# Patient Record
Sex: MALE | Race: WHITE | NOT HISPANIC OR LATINO
[De-identification: names, ages, dates, MRNs, and addresses within clinical notes are randomized per-mention and may not be internally consistent; named-entity substitution may affect disease eponyms.]

---

## 2023-01-09 PROBLEM — Z00.00 ENCOUNTER FOR PREVENTIVE HEALTH EXAMINATION: Status: ACTIVE | Noted: 2023-01-09

## 2023-01-09 NOTE — DATA REVIEWED
[FreeTextEntry1] : Cardiac Cath [facility] 1/9/2022: pLAD 99%, mLAD 20%, first diag 99%, first septal 99%, prox Cx 80%, pRCA 50%

## 2023-01-09 NOTE — HISTORY OF PRESENT ILLNESS
[FreeTextEntry1] : Mr. Robbins is a 67 year old male who presents today for evaluation of coronary artery disease.  To review, PMH includes CAD (PCI - LAD in 2010), HTN, HLD and gout.  He was recently evaluated by his cardiologist Dr. Tray Segundo for exertional chest pain. He underwent stress stest that was abnormal and ultimately underwent cardiac cath  that showed severe multi vessel disease. pLAD 99%, mLAD 20%, first diag 99%, first septal 99%, prox Cx 80%, pRCA 50%

## 2023-01-10 ENCOUNTER — APPOINTMENT (OUTPATIENT)
Dept: CARDIOTHORACIC SURGERY | Facility: CLINIC | Age: 68
End: 2023-01-10
Payer: MEDICARE

## 2023-01-10 VITALS
DIASTOLIC BLOOD PRESSURE: 85 MMHG | SYSTOLIC BLOOD PRESSURE: 146 MMHG | OXYGEN SATURATION: 98 % | BODY MASS INDEX: 29.77 KG/M2 | HEART RATE: 62 BPM | RESPIRATION RATE: 16 BRPM | WEIGHT: 201 LBS | HEIGHT: 69 IN

## 2023-01-10 DIAGNOSIS — Z78.9 OTHER SPECIFIED HEALTH STATUS: ICD-10-CM

## 2023-01-10 DIAGNOSIS — Z86.39 PERSONAL HISTORY OF OTHER ENDOCRINE, NUTRITIONAL AND METABOLIC DISEASE: ICD-10-CM

## 2023-01-10 DIAGNOSIS — I10 ESSENTIAL (PRIMARY) HYPERTENSION: ICD-10-CM

## 2023-01-10 DIAGNOSIS — R06.02 SHORTNESS OF BREATH: ICD-10-CM

## 2023-01-10 PROCEDURE — 99205 OFFICE O/P NEW HI 60 MIN: CPT

## 2023-01-10 RX ORDER — ASPIRIN 81 MG/1
81 TABLET ORAL
Refills: 0 | Status: ACTIVE | COMMUNITY

## 2023-01-10 NOTE — HISTORY OF PRESENT ILLNESS
[FreeTextEntry1] : Mr. ERIK WALLER is a 67 year old male referred by Dr. Tray Segundo who presents for consultation regarding coronary artery disease. He had recently presented to his cardiologist with exertional chest pressure and shortness of breath. Subsequently, he had a stress test that showed a mild defect in two areas (inferior and anterolateral). Cardiac catheterization revealed severe 4-vessel coronary artery disease with an elevated LVEDP and normal systolic left ventricular function.\par \par His pertinent past medical history includes coronary artery disease status post stent to LAD in 2010, hypertension, hyperlipidemia and gout.\par \par Today, the patient reports no chest pressure since his incident, no shortness of breath, palpitations, lower extremity edema.

## 2023-01-10 NOTE — CONSULT LETTER
[FreeTextEntry2] : Tray Segundo MD [FreeTextEntry3] : Ravinder Booker MD\par Chief, Cardiovascular Surgery at [facility]\par System Director of Surgical Heart Failure\par Professor, Cardiovascular and Thoracic Surgery\par [facility] [employer], [facility]\par [facility]\par [address] [place] [address]\[place] [address]\par Tel. [phone]\par Fax [phone]\par

## 2023-01-10 NOTE — REVIEW OF SYSTEMS
[SOB on Exertion] : shortness of breath during exertion [Negative] : Heme/Lymph [Feeling Poorly] : not feeling poorly [Feeling Tired] : not feeling tired [Chest Pain] : no chest pain [Palpitations] : no palpitations [Lower Ext Edema] : no extremity edema [Shortness Of Breath] : no shortness of breath [Cough] : no cough

## 2023-01-10 NOTE — DATA REVIEWED
[FreeTextEntry1] : Cardiac Cath\par 1/9/23\par [facility]\par \par Left Main: normal\par Left anterior descending: mild diffuse disease, proximal lesion 99% stenosed, mid LAD 20%\par First diagonal branch: 99% stenosed\par First Septal branch: 99% stenosed\par Left Circumflex: Mild diffuse disease, proximal lesion 80% stenosed\par Right Coronary Artery, moderate diffuse disease, proximal lesion, 50% stenosed, mid RCA 90%\par \par Left ventricle: normal size with elevated LV end diastolic pressure of 23 mmHg\par Ejection fraction is 50-59% by visual estimate

## 2023-01-10 NOTE — ASSESSMENT
[FreeTextEntry1] : Mr Rosendo presents to the office today to discuss recent imaging consistent with multivessel coronary artery disease. He had initially been evaluated by his cardiologist after having some acute exertional shortness of breath and chest discomfort with walking through the woods. This was the first time he has experienced this feeling. He has not experienced these symptoms since. \par \par Cardiac Catheterization imaging was independently reviewed and there is clear evidence of multivessel coronary artery disease with a prior stent in place. Addressing the occlusions will require coronary artery bypass grafting. \par \par Risks benefits and alternatives to CABG were discussed with the patient in detail. Risks discussed included, but not limited to infection, bleeding, myocardial infarction, cerebrovascular accident, renal failure, vascular injury requiring intervention, cardiac rupture, and death.\par \par The procedure, hospital stay and recovery was discussed in detail. All questions addressed. Patient would like to proceed with surgical intervention as discussed.\par \par PLAN:\par - Transthoracic Echocardiogram at [facility] \par - Obtain Carotid Artery Ultrasound results from Cardiology \par - Coronary artery bypass grafting x 4 \par \par \par \par \par \par \par I, Dr. Booker, personally performed the evaluation and management (E/M) services for this new patient.  That E/M includes conducting the initial examination, assessing all conditions, and establishing the plan of care.  Today, my ACP, Rolly Glynn NP was here to observe my evaluation and management services for this patient to be followed going forward.\par \par \par

## 2023-01-11 ENCOUNTER — APPOINTMENT (OUTPATIENT)
Dept: CARDIOTHORACIC SURGERY | Facility: CLINIC | Age: 68
End: 2023-01-11
Payer: MEDICARE

## 2023-01-11 ENCOUNTER — APPOINTMENT (OUTPATIENT)
Dept: CARDIOTHORACIC SURGERY | Facility: CLINIC | Age: 68
End: 2023-01-11

## 2023-01-19 ENCOUNTER — OUTPATIENT (OUTPATIENT)
Dept: OUTPATIENT SERVICES | Facility: HOSPITAL | Age: 68
LOS: 1 days | End: 2023-01-19
Payer: MEDICARE

## 2023-01-19 ENCOUNTER — RESULT REVIEW (OUTPATIENT)
Age: 68
End: 2023-01-19

## 2023-01-19 VITALS
SYSTOLIC BLOOD PRESSURE: 130 MMHG | RESPIRATION RATE: 14 BRPM | HEART RATE: 63 BPM | OXYGEN SATURATION: 97 % | DIASTOLIC BLOOD PRESSURE: 70 MMHG | TEMPERATURE: 97 F | HEIGHT: 69 IN | WEIGHT: 207.23 LBS

## 2023-01-19 DIAGNOSIS — I25.10 ATHEROSCLEROTIC HEART DISEASE OF NATIVE CORONARY ARTERY WITHOUT ANGINA PECTORIS: ICD-10-CM

## 2023-01-19 DIAGNOSIS — I10 ESSENTIAL (PRIMARY) HYPERTENSION: ICD-10-CM

## 2023-01-19 DIAGNOSIS — Z98.61 CORONARY ANGIOPLASTY STATUS: Chronic | ICD-10-CM

## 2023-01-19 DIAGNOSIS — Z98.890 OTHER SPECIFIED POSTPROCEDURAL STATES: Chronic | ICD-10-CM

## 2023-01-19 DIAGNOSIS — Z91.89 OTHER SPECIFIED PERSONAL RISK FACTORS, NOT ELSEWHERE CLASSIFIED: ICD-10-CM

## 2023-01-19 DIAGNOSIS — Z29.9 ENCOUNTER FOR PROPHYLACTIC MEASURES, UNSPECIFIED: ICD-10-CM

## 2023-01-19 DIAGNOSIS — Z01.818 ENCOUNTER FOR OTHER PREPROCEDURAL EXAMINATION: ICD-10-CM

## 2023-01-19 LAB
A1C WITH ESTIMATED AVERAGE GLUCOSE RESULT: 5.2 % — SIGNIFICANT CHANGE UP (ref 4–5.6)
ALBUMIN SERPL ELPH-MCNC: 4.4 G/DL — SIGNIFICANT CHANGE UP (ref 3.3–5.2)
ALP SERPL-CCNC: 56 U/L — SIGNIFICANT CHANGE UP (ref 40–120)
ALT FLD-CCNC: 22 U/L — SIGNIFICANT CHANGE UP
ANION GAP SERPL CALC-SCNC: 10 MMOL/L — SIGNIFICANT CHANGE UP (ref 5–17)
APPEARANCE UR: ABNORMAL
APTT BLD: 29.1 SEC — SIGNIFICANT CHANGE UP (ref 27.5–35.5)
AST SERPL-CCNC: 21 U/L — SIGNIFICANT CHANGE UP
BACTERIA # UR AUTO: ABNORMAL
BASOPHILS # BLD AUTO: 0.03 K/UL — SIGNIFICANT CHANGE UP (ref 0–0.2)
BASOPHILS NFR BLD AUTO: 0.5 % — SIGNIFICANT CHANGE UP (ref 0–2)
BILIRUB SERPL-MCNC: 0.6 MG/DL — SIGNIFICANT CHANGE UP (ref 0.4–2)
BILIRUB UR-MCNC: NEGATIVE — SIGNIFICANT CHANGE UP
BLD GP AB SCN SERPL QL: SIGNIFICANT CHANGE UP
BUN SERPL-MCNC: 14.2 MG/DL — SIGNIFICANT CHANGE UP (ref 8–20)
CALCIUM SERPL-MCNC: 9.1 MG/DL — SIGNIFICANT CHANGE UP (ref 8.4–10.5)
CHLORIDE SERPL-SCNC: 105 MMOL/L — SIGNIFICANT CHANGE UP (ref 96–108)
CO2 SERPL-SCNC: 23 MMOL/L — SIGNIFICANT CHANGE UP (ref 22–29)
COLOR SPEC: YELLOW — SIGNIFICANT CHANGE UP
COMMENT - URINE: SIGNIFICANT CHANGE UP
CREAT SERPL-MCNC: 0.84 MG/DL — SIGNIFICANT CHANGE UP (ref 0.5–1.3)
DIFF PNL FLD: ABNORMAL
EGFR: 96 ML/MIN/1.73M2 — SIGNIFICANT CHANGE UP
EOSINOPHIL # BLD AUTO: 0.14 K/UL — SIGNIFICANT CHANGE UP (ref 0–0.5)
EOSINOPHIL NFR BLD AUTO: 2.5 % — SIGNIFICANT CHANGE UP (ref 0–6)
EPI CELLS # UR: NEGATIVE — SIGNIFICANT CHANGE UP
ESTIMATED AVERAGE GLUCOSE: 103 MG/DL — SIGNIFICANT CHANGE UP (ref 68–114)
GLUCOSE SERPL-MCNC: 102 MG/DL — HIGH (ref 70–99)
GLUCOSE UR QL: NEGATIVE MG/DL — SIGNIFICANT CHANGE UP
HCT VFR BLD CALC: 43.1 % — SIGNIFICANT CHANGE UP (ref 39–50)
HGB BLD-MCNC: 15.2 G/DL — SIGNIFICANT CHANGE UP (ref 13–17)
IMM GRANULOCYTES NFR BLD AUTO: 0.2 % — SIGNIFICANT CHANGE UP (ref 0–0.9)
INR BLD: 1.02 RATIO — SIGNIFICANT CHANGE UP (ref 0.88–1.16)
KETONES UR-MCNC: NEGATIVE — SIGNIFICANT CHANGE UP
LEUKOCYTE ESTERASE UR-ACNC: NEGATIVE — SIGNIFICANT CHANGE UP
LYMPHOCYTES # BLD AUTO: 1.02 K/UL — SIGNIFICANT CHANGE UP (ref 1–3.3)
LYMPHOCYTES # BLD AUTO: 17.9 % — SIGNIFICANT CHANGE UP (ref 13–44)
MCHC RBC-ENTMCNC: 30.5 PG — SIGNIFICANT CHANGE UP (ref 27–34)
MCHC RBC-ENTMCNC: 35.3 GM/DL — SIGNIFICANT CHANGE UP (ref 32–36)
MCV RBC AUTO: 86.4 FL — SIGNIFICANT CHANGE UP (ref 80–100)
MONOCYTES # BLD AUTO: 0.41 K/UL — SIGNIFICANT CHANGE UP (ref 0–0.9)
MONOCYTES NFR BLD AUTO: 7.2 % — SIGNIFICANT CHANGE UP (ref 2–14)
MRSA PCR RESULT.: SIGNIFICANT CHANGE UP
NEUTROPHILS # BLD AUTO: 4.1 K/UL — SIGNIFICANT CHANGE UP (ref 1.8–7.4)
NEUTROPHILS NFR BLD AUTO: 71.7 % — SIGNIFICANT CHANGE UP (ref 43–77)
NITRITE UR-MCNC: NEGATIVE — SIGNIFICANT CHANGE UP
NT-PROBNP SERPL-SCNC: 216 PG/ML — SIGNIFICANT CHANGE UP (ref 0–300)
PH UR: 5 — SIGNIFICANT CHANGE UP (ref 5–8)
PLATELET # BLD AUTO: 166 K/UL — SIGNIFICANT CHANGE UP (ref 150–400)
POTASSIUM SERPL-MCNC: 4.4 MMOL/L — SIGNIFICANT CHANGE UP (ref 3.5–5.3)
POTASSIUM SERPL-SCNC: 4.4 MMOL/L — SIGNIFICANT CHANGE UP (ref 3.5–5.3)
PREALB SERPL-MCNC: 22 MG/DL — SIGNIFICANT CHANGE UP (ref 18–38)
PROT SERPL-MCNC: 6.8 G/DL — SIGNIFICANT CHANGE UP (ref 6.6–8.7)
PROT UR-MCNC: NEGATIVE — SIGNIFICANT CHANGE UP
PROTHROM AB SERPL-ACNC: 11.8 SEC — SIGNIFICANT CHANGE UP (ref 10.5–13.4)
RBC # BLD: 4.99 M/UL — SIGNIFICANT CHANGE UP (ref 4.2–5.8)
RBC # FLD: 12.1 % — SIGNIFICANT CHANGE UP (ref 10.3–14.5)
RBC CASTS # UR COMP ASSIST: NEGATIVE /HPF — SIGNIFICANT CHANGE UP (ref 0–4)
S AUREUS DNA NOSE QL NAA+PROBE: SIGNIFICANT CHANGE UP
SODIUM SERPL-SCNC: 138 MMOL/L — SIGNIFICANT CHANGE UP (ref 135–145)
SP GR SPEC: 1.02 — SIGNIFICANT CHANGE UP (ref 1.01–1.02)
T3 SERPL-MCNC: 102 NG/DL — SIGNIFICANT CHANGE UP (ref 80–200)
T4 AB SER-ACNC: 5.8 UG/DL — SIGNIFICANT CHANGE UP (ref 4.5–12)
TSH SERPL-MCNC: 1.81 UIU/ML — SIGNIFICANT CHANGE UP (ref 0.27–4.2)
UROBILINOGEN FLD QL: NEGATIVE MG/DL — SIGNIFICANT CHANGE UP
WBC # BLD: 5.71 K/UL — SIGNIFICANT CHANGE UP (ref 3.8–10.5)
WBC # FLD AUTO: 5.71 K/UL — SIGNIFICANT CHANGE UP (ref 3.8–10.5)
WBC UR QL: NEGATIVE /HPF — SIGNIFICANT CHANGE UP (ref 0–5)

## 2023-01-19 PROCEDURE — G0463: CPT

## 2023-01-19 PROCEDURE — 93306 TTE W/DOPPLER COMPLETE: CPT

## 2023-01-19 PROCEDURE — 93306 TTE W/DOPPLER COMPLETE: CPT | Mod: 26

## 2023-01-19 PROCEDURE — 71046 X-RAY EXAM CHEST 2 VIEWS: CPT

## 2023-01-19 PROCEDURE — 93005 ELECTROCARDIOGRAM TRACING: CPT

## 2023-01-19 PROCEDURE — 93010 ELECTROCARDIOGRAM REPORT: CPT

## 2023-01-19 PROCEDURE — 71046 X-RAY EXAM CHEST 2 VIEWS: CPT | Mod: 26

## 2023-01-19 RX ORDER — CEFUROXIME AXETIL 250 MG
1500 TABLET ORAL ONCE
Refills: 0 | Status: DISCONTINUED | OUTPATIENT
Start: 2023-01-27 | End: 2023-01-27

## 2023-01-19 NOTE — H&P PST ADULT - HISTORY OF PRESENT ILLNESS
67 year old male with a pmhx of CAD pci x1, HTN, HLD.  Presents     reports dyspnea on exertion Nov 20th 2022    Patient denies chest pain, SOB, palpitations, dizziness, near syncope, or syncope   reports dyspnea with exertion,   Patient is scheduled for CABG x4 with Dr Booker on 1/27/23   67 year old male with a pmhx of CAD pci x1 in 2010, HTN, HLD, gout, family hx of early CAD Father MI at 50. Presented to his Cardiologist with dyspnea on exertion since Nov 2022.  He is s/p stress test that showed a mild defect in two areas (inferior and anterolateral). Cardiac catheterization revealed severe 4-vessel coronary artery disease with an elevated LVEDP and normal systolic left ventricular function. Patient denies chest pain, SOB, palpitations, dizziness, near syncope, or syncope. Patient is scheduled for CABG x4 with Dr Booker on 1/27/23.

## 2023-01-19 NOTE — H&P PST ADULT - ASSESSMENT
CAPRINI SCORE    AGE RELATED RISK FACTORS                                                             [ ] Age 41-60 years                                            (1 Point)  [ ] Age: 61-74 years                                           (2 Points)                 [ ] Age= 75 years                                                (3 Points)             DISEASE RELATED RISK FACTORS                                                       [ ] Edema in the lower extremities                 (1 Point)                     [ ] Varicose veins                                               (1 Point)                                 [ ] BMI > 25 Kg/m2                                            (1 Point)                                  [ ] Serious infection (ie PNA)                            (1 Point)                     [ ] Lung disease ( COPD, Emphysema)            (1 Point)                                                                          [ ] Acute myocardial infarction                         (1 Point)                  [ ] Congestive heart failure (in the previous month)  (1 Point)         [ ] Inflammatory bowel disease                            (1 Point)                  [ ] Central venous access, PICC or Port               (2 points)       (within the last month)                                                                [ ] Stroke (in the previous month)                        (5 Points)    [ ] Previous or present malignancy                       (2 points)                                                                                                                                                         HEMATOLOGY RELATED FACTORS                                                         [ ] Prior episodes of VTE                                     (3 Points)                     [ ] Positive family history for VTE                      (3 Points)                  [ ] Prothrombin 13158 A                                     (3 Points)                     [ ] Factor V Leiden                                                (3 Points)                        [ ] Lupus anticoagulants                                      (3 Points)                                                           [ ] Anticardiolipin antibodies                              (3 Points)                                                       [ ] High homocysteine in the blood                   (3 Points)                                             [ ] Other congenital or acquired thrombophilia      (3 Points)                                                [ ] Heparin induced thrombocytopenia                  (3 Points)                                        MOBILITY RELATED FACTORS  [ ] Bed rest                                                         (1 Point)  [ ] Plaster cast                                                    (2 points)  [ ] Bed bound for more than 72 hours           (2 Points)    GENDER SPECIFIC FACTORS  [ ] Pregnancy or had a baby within the last month   (1 Point)  [ ] Post-partum < 6 weeks                                   (1 Point)  [ ] Hormonal therapy  or oral contraception   (1 Point)  [ ] History of pregnancy complications              (1 point)  [ ] Unexplained or recurrent              (1 Point)    OTHER RISK FACTORS                                           (1 Point)  [ ] BMI >40, smoking, diabetes requiring insulin, chemotherapy  blood transfusions and length of surgery over 2 hours    SURGERY RELATED RISK FACTORS  [ ]  Section within the last month     (1 Point)  [ ] Minor surgery                                                  (1 Point)  [ ] Arthroscopic surgery                                       (2 Points)  [ ] Planned major surgery lasting more            (2 Points)      than 45 minutes     [ ] Elective hip or knee joint replacement       (5 points)       surgery                                                TRAUMA RELATED RISK FACTORS  [ ] Fracture of the hip, pelvis, or leg                       (5 Points)  [ ] Spinal cord injury resulting in paralysis             (5 points)       (in the previous month)    [ ] Paralysis  (less than 1 month)                             (5 Points)  [ ] Multiple Trauma within 1 month                        (5 Points)    Total Score [        ]    Caprini Score 0-2: Low Risk, NO VTE prophylaxis required for most patients, encourage ambulation  Caprini Score 3-6: Moderate Risk , pharmacologic VTE prophylaxis is indicated for most patients (in the absence of contraindications)  Caprini Score Greater than or =7: High risk, pharmocologic VTE prophylaxis indicated for most patients (in the absence of contraindications)                OPIOID RISK TOOL    PIPER EACH BOX THAT APPLIES AND ADD TOTALS AT THE END    FAMILY HISTORY OF SUBSTANCE ABUSE                 FEMALE         MALE                                                Alcohol                             [  ]1 pt          [  ]3pts                                               Illegal Durgs                     [  ]2 pts        [  ]3pts                                               Rx Drugs                           [  ]4 pts        [  ]4 pts    PERSONAL HISTORY OF SUBSTANCE ABUSE                                                                                          Alcohol                             [  ]3 pts       [  ]3 pts                                               Illegal Durgs                     [  ]4 pts        [  ]4 pts                                               Rx Drugs                           [  ]5 pts        [  ]5 pts    AGE BETWEEN 16-45 YEARS                                      [  ]1 pt         [  ]1 pt    HISTORY OF PREADOLESCENT   SEXUAL ABUSE                                                             [  ]3 pts        [  ]0pts    PSYCHOLOGICAL DISEASE                     ADD, OCD, Bipolar, Schizophrenia        [  ]2 pts         [  ]2 pts                      Depression                                               [  ]1 pt           [  ]1 pt           SCORING TOTAL   (add numbers and type here)              (***)                                     A score of 3 or lower indicated LOW risk for future opiod abuse  A score of 4 to 7 indicated moderate risk for future opiod abuse  A score of 8 or higher indicates a high risk for opiod abuse                   CAPRINI SCORE    AGE RELATED RISK FACTORS                                                             [ ] Age 41-60 years                                            (1 Point)  [x ] Age: 61-74 years                                           (2 Points)                 [ ] Age= 75 years                                                (3 Points)             DISEASE RELATED RISK FACTORS                                                       [ ] Edema in the lower extremities                 (1 Point)                     [x] Varicose veins                                               (1 Point)                                 [ x] BMI > 25 Kg/m2                                            (1 Point)                                  [ ] Serious infection (ie PNA)                            (1 Point)                     [ ] Lung disease ( COPD, Emphysema)            (1 Point)                                                                          [ ] Acute myocardial infarction                         (1 Point)                  [ ] Congestive heart failure (in the previous month)  (1 Point)         [ ] Inflammatory bowel disease                            (1 Point)                  [ ] Central venous access, PICC or Port               (2 points)       (within the last month)                                                                [ ] Stroke (in the previous month)                        (5 Points)    [ ] Previous or present malignancy                       (2 points)                                                                                                                                                         HEMATOLOGY RELATED FACTORS                                                         [ ] Prior episodes of VTE                                     (3 Points)                     [ ] Positive family history for VTE                      (3 Points)                  [ ] Prothrombin 92275 A                                     (3 Points)                     [ ] Factor V Leiden                                                (3 Points)                        [ ] Lupus anticoagulants                                      (3 Points)                                                           [ ] Anticardiolipin antibodies                              (3 Points)                                                       [ ] High homocysteine in the blood                   (3 Points)                                             [ ] Other congenital or acquired thrombophilia      (3 Points)                                                [ ] Heparin induced thrombocytopenia                  (3 Points)                                        MOBILITY RELATED FACTORS  [ ] Bed rest                                                         (1 Point)  [ ] Plaster cast                                                    (2 points)  [ ] Bed bound for more than 72 hours           (2 Points)    GENDER SPECIFIC FACTORS  [ ] Pregnancy or had a baby within the last month   (1 Point)  [ ] Post-partum < 6 weeks                                   (1 Point)  [ ] Hormonal therapy  or oral contraception   (1 Point)  [ ] History of pregnancy complications              (1 point)  [ ] Unexplained or recurrent              (1 Point)    OTHER RISK FACTORS                                           (1 Point)  [x ] BMI >40, smoking, diabetes requiring insulin, chemotherapy  blood transfusions and length of surgery over 2 hours    SURGERY RELATED RISK FACTORS  [ ]  Section within the last month     (1 Point)  [ ] Minor surgery                                                  (1 Point)  [ ] Arthroscopic surgery                                       (2 Points)  [ x] Planned major surgery lasting more            (2 Points)      than 45 minutes     [ ] Elective hip or knee joint replacement       (5 points)       surgery                                                TRAUMA RELATED RISK FACTORS  [ ] Fracture of the hip, pelvis, or leg                       (5 Points)  [ ] Spinal cord injury resulting in paralysis             (5 points)       (in the previous month)    [ ] Paralysis  (less than 1 month)                             (5 Points)  [ ] Multiple Trauma within 1 month                        (5 Points)    Total Score [   7     ]    Caprini Score 0-2: Low Risk, NO VTE prophylaxis required for most patients, encourage ambulation  Caprini Score 3-6: Moderate Risk , pharmacologic VTE prophylaxis is indicated for most patients (in the absence of contraindications)  Caprini Score Greater than or =7: High risk, pharmocologic VTE prophylaxis indicated for most patients (in the absence of contraindications)                OPIOID RISK TOOL    PIPER EACH BOX THAT APPLIES AND ADD TOTALS AT THE END    FAMILY HISTORY OF SUBSTANCE ABUSE                 FEMALE         MALE                                                Alcohol                             [  ]1 pt          [  ]3pts                                               Illegal Durgs                     [  ]2 pts        [  ]3pts                                               Rx Drugs                           [  ]4 pts        [  ]4 pts    PERSONAL HISTORY OF SUBSTANCE ABUSE                                                                                          Alcohol                             [  ]3 pts       [  ]3 pts                                               Illegal Durgs                     [  ]4 pts        [  ]4 pts                                               Rx Drugs                           [  ]5 pts        [  ]5 pts    AGE BETWEEN 16-45 YEARS                                      [  ]1 pt         [  ]1 pt    HISTORY OF PREADOLESCENT   SEXUAL ABUSE                                                             [  ]3 pts        [  ]0pts    PSYCHOLOGICAL DISEASE                     ADD, OCD, Bipolar, Schizophrenia        [  ]2 pts         [  ]2 pts                      Depression                                               [  ]1 pt           [  ]1 pt           SCORING TOTAL   0                                 A score of 3 or lower indicated LOW risk for future opiod abuse  A score of 4 to 7 indicated moderate risk for future opiod abuse  A score of 8 or higher indicates a high risk for opiod abuse      67 year old male with a pmhx of CAD pci x1 in , HTN, HLD, gout, family hx of early CAD Father MI at 50. Presented to his Cardiologist with dyspnea on exertion since 2022.  He is s/p stress test that showed a mild defect in two areas (inferior and anterolateral). Cardiac catheterization revealed severe 4-vessel coronary artery disease with an elevated LVEDP and normal systolic left ventricular function. Patient denies chest pain, SOB, palpitations, dizziness, near syncope, or syncope. Patient is scheduled for CABG x4 with Dr Booker on 23. Patient educated on surgical scrub, COVID testing, preadmission instructions, and day of procedure medications, verbalizes understanding. Pt instructed to stop vitamins/supplements/herbal medications/NSAIDS for one week prior to surgery and discuss with PMD.

## 2023-01-19 NOTE — H&P PST ADULT - NSICDXPASTMEDICALHX_GEN_ALL_CORE_FT
PAST MEDICAL HISTORY:  CAD (coronary artery disease)     Hyperlipidemia     Hypertension      PAST MEDICAL HISTORY:  CAD (coronary artery disease)     Gout     Hyperlipidemia     Hypertension

## 2023-01-19 NOTE — H&P PST ADULT - NSANTHOSAYNRD_GEN_A_CORE
No. PUMA screening performed.  STOP BANG Legend: 0-2 = LOW Risk; 3-4 = INTERMEDIATE Risk; 5-8 = HIGH Risk

## 2023-01-19 NOTE — H&P PST ADULT - NSICDXPASTSURGICALHX_GEN_ALL_CORE_FT
PAST SURGICAL HISTORY:  History of percutaneous coronary intervention     S/P arthroscopy of shoulder      PAST SURGICAL HISTORY:  History of percutaneous coronary intervention     S/P arthroscopy of shoulder     Status post phlebectomy

## 2023-01-21 LAB
CULTURE RESULTS: SIGNIFICANT CHANGE UP
SPECIMEN SOURCE: SIGNIFICANT CHANGE UP

## 2023-01-26 ENCOUNTER — TRANSCRIPTION ENCOUNTER (OUTPATIENT)
Age: 68
End: 2023-01-26

## 2023-01-27 ENCOUNTER — APPOINTMENT (OUTPATIENT)
Dept: CARDIOTHORACIC SURGERY | Facility: HOSPITAL | Age: 68
End: 2023-01-27

## 2023-01-27 ENCOUNTER — INPATIENT (INPATIENT)
Facility: HOSPITAL | Age: 68
LOS: 4 days | Discharge: HOME | DRG: 236 | End: 2023-02-01
Attending: THORACIC SURGERY (CARDIOTHORACIC VASCULAR SURGERY) | Admitting: THORACIC SURGERY (CARDIOTHORACIC VASCULAR SURGERY)
Payer: MEDICARE

## 2023-01-27 VITALS
HEART RATE: 57 BPM | SYSTOLIC BLOOD PRESSURE: 139 MMHG | OXYGEN SATURATION: 98 % | RESPIRATION RATE: 16 BRPM | WEIGHT: 207.23 LBS | HEIGHT: 69 IN | TEMPERATURE: 98 F | DIASTOLIC BLOOD PRESSURE: 80 MMHG

## 2023-01-27 DIAGNOSIS — Z98.61 CORONARY ANGIOPLASTY STATUS: Chronic | ICD-10-CM

## 2023-01-27 DIAGNOSIS — Z98.890 OTHER SPECIFIED POSTPROCEDURAL STATES: Chronic | ICD-10-CM

## 2023-01-27 DIAGNOSIS — I25.10 ATHEROSCLEROTIC HEART DISEASE OF NATIVE CORONARY ARTERY WITHOUT ANGINA PECTORIS: ICD-10-CM

## 2023-01-27 LAB
ABO RH CONFIRMATION: SIGNIFICANT CHANGE UP
ALBUMIN SERPL ELPH-MCNC: 3.8 G/DL — SIGNIFICANT CHANGE UP (ref 3.3–5.2)
ALP SERPL-CCNC: 44 U/L — SIGNIFICANT CHANGE UP (ref 40–120)
ALT FLD-CCNC: 19 U/L — SIGNIFICANT CHANGE UP
ANION GAP SERPL CALC-SCNC: 14 MMOL/L — SIGNIFICANT CHANGE UP (ref 5–17)
APTT BLD: 26.3 SEC — LOW (ref 27.5–35.5)
AST SERPL-CCNC: 28 U/L — SIGNIFICANT CHANGE UP
BASE EXCESS BLDA CALC-SCNC: -1.1 MMOL/L — SIGNIFICANT CHANGE UP (ref -2–3)
BASE EXCESS BLDA CALC-SCNC: -3.1 MMOL/L — LOW (ref -2–3)
BASE EXCESS BLDA CALC-SCNC: -3.2 MMOL/L — LOW (ref -2–3)
BASE EXCESS BLDA CALC-SCNC: -4.4 MMOL/L — LOW (ref -2–3)
BASE EXCESS BLDA CALC-SCNC: -6.2 MMOL/L — LOW (ref -2–3)
BASE EXCESS BLDA CALC-SCNC: 0.8 MMOL/L — SIGNIFICANT CHANGE UP (ref -2–3)
BASE EXCESS BLDV CALC-SCNC: -3.4 MMOL/L — LOW (ref -2–3)
BASE EXCESS BLDV CALC-SCNC: -4.5 MMOL/L — LOW (ref -2–3)
BASE EXCESS BLDV CALC-SCNC: 1.6 MMOL/L — SIGNIFICANT CHANGE UP (ref -2–3)
BILIRUB DIRECT SERPL-MCNC: 0.3 MG/DL — SIGNIFICANT CHANGE UP (ref 0–0.3)
BILIRUB INDIRECT FLD-MCNC: 1 MG/DL — SIGNIFICANT CHANGE UP (ref 0.2–1)
BILIRUB SERPL-MCNC: 1.3 MG/DL — SIGNIFICANT CHANGE UP (ref 0.4–2)
BUN SERPL-MCNC: 15.9 MG/DL — SIGNIFICANT CHANGE UP (ref 8–20)
CA-I BLDA-SCNC: 1 MMOL/L — LOW (ref 1.15–1.33)
CA-I BLDA-SCNC: 1.02 MMOL/L — LOW (ref 1.15–1.33)
CA-I BLDA-SCNC: 1.05 MMOL/L — LOW (ref 1.15–1.33)
CA-I BLDA-SCNC: 1.17 MMOL/L — SIGNIFICANT CHANGE UP (ref 1.15–1.33)
CA-I BLDA-SCNC: 1.19 MMOL/L — SIGNIFICANT CHANGE UP (ref 1.15–1.33)
CA-I BLDA-SCNC: 1.21 MMOL/L — SIGNIFICANT CHANGE UP (ref 1.15–1.33)
CA-I SERPL-SCNC: 0.97 MMOL/L — LOW (ref 1.15–1.33)
CA-I SERPL-SCNC: 1 MMOL/L — LOW (ref 1.15–1.33)
CA-I SERPL-SCNC: 1 MMOL/L — LOW (ref 1.15–1.33)
CALCIUM SERPL-MCNC: 9 MG/DL — SIGNIFICANT CHANGE UP (ref 8.4–10.5)
CHLORIDE BLDA-SCNC: 101 MMOL/L — SIGNIFICANT CHANGE UP (ref 96–108)
CHLORIDE BLDA-SCNC: 101 MMOL/L — SIGNIFICANT CHANGE UP (ref 96–108)
CHLORIDE BLDA-SCNC: 102 MMOL/L — SIGNIFICANT CHANGE UP (ref 96–108)
CHLORIDE BLDA-SCNC: 102 MMOL/L — SIGNIFICANT CHANGE UP (ref 96–108)
CHLORIDE BLDA-SCNC: 104 MMOL/L — SIGNIFICANT CHANGE UP (ref 96–108)
CHLORIDE BLDA-SCNC: 105 MMOL/L — SIGNIFICANT CHANGE UP (ref 96–108)
CHLORIDE BLDV-SCNC: 100 MMOL/L — SIGNIFICANT CHANGE UP (ref 96–108)
CHLORIDE BLDV-SCNC: 103 MMOL/L — SIGNIFICANT CHANGE UP (ref 96–108)
CHLORIDE BLDV-SCNC: 103 MMOL/L — SIGNIFICANT CHANGE UP (ref 96–108)
CHLORIDE SERPL-SCNC: 105 MMOL/L — SIGNIFICANT CHANGE UP (ref 96–108)
CO2 SERPL-SCNC: 22 MMOL/L — SIGNIFICANT CHANGE UP (ref 22–29)
COHGB MFR BLDA: 1.3 % — SIGNIFICANT CHANGE UP
COHGB MFR BLDA: 1.4 % — SIGNIFICANT CHANGE UP
COHGB MFR BLDA: 1.5 % — SIGNIFICANT CHANGE UP
COHGB MFR BLDA: 1.6 % — SIGNIFICANT CHANGE UP
COHGB MFR BLDA: 1.8 % — SIGNIFICANT CHANGE UP
COHGB MFR BLDA: 2.1 % — SIGNIFICANT CHANGE UP
COHGB MFR BLDV: 1.6 % — SIGNIFICANT CHANGE UP
COHGB MFR BLDV: 1.6 % — SIGNIFICANT CHANGE UP
COHGB MFR BLDV: 1.9 % — SIGNIFICANT CHANGE UP
CREAT SERPL-MCNC: 0.85 MG/DL — SIGNIFICANT CHANGE UP (ref 0.5–1.3)
EGFR: 96 ML/MIN/1.73M2 — SIGNIFICANT CHANGE UP
GAS PNL BLDA: SIGNIFICANT CHANGE UP
GAS PNL BLDV: 131 MMOL/L — LOW (ref 136–145)
GAS PNL BLDV: 133 MMOL/L — LOW (ref 136–145)
GAS PNL BLDV: 138 MMOL/L — SIGNIFICANT CHANGE UP (ref 136–145)
GLUCOSE BLDA-MCNC: 100 MG/DL — HIGH (ref 70–99)
GLUCOSE BLDA-MCNC: 127 MG/DL — HIGH (ref 70–99)
GLUCOSE BLDA-MCNC: 153 MG/DL — HIGH (ref 70–99)
GLUCOSE BLDA-MCNC: 156 MG/DL — HIGH (ref 70–99)
GLUCOSE BLDA-MCNC: 161 MG/DL — HIGH (ref 70–99)
GLUCOSE BLDA-MCNC: 178 MG/DL — HIGH (ref 70–99)
GLUCOSE BLDC GLUCOMTR-MCNC: 189 MG/DL — HIGH (ref 70–99)
GLUCOSE BLDC GLUCOMTR-MCNC: 198 MG/DL — HIGH (ref 70–99)
GLUCOSE BLDC GLUCOMTR-MCNC: 201 MG/DL — HIGH (ref 70–99)
GLUCOSE BLDC GLUCOMTR-MCNC: 211 MG/DL — HIGH (ref 70–99)
GLUCOSE BLDC GLUCOMTR-MCNC: 212 MG/DL — HIGH (ref 70–99)
GLUCOSE BLDC GLUCOMTR-MCNC: 213 MG/DL — HIGH (ref 70–99)
GLUCOSE BLDC GLUCOMTR-MCNC: 99 MG/DL — SIGNIFICANT CHANGE UP (ref 70–99)
GLUCOSE BLDV-MCNC: 152 MG/DL — HIGH (ref 70–99)
GLUCOSE BLDV-MCNC: 156 MG/DL — HIGH (ref 70–99)
GLUCOSE BLDV-MCNC: 176 MG/DL — HIGH (ref 70–99)
GLUCOSE SERPL-MCNC: 167 MG/DL — HIGH (ref 70–99)
HCO3 BLDA-SCNC: 19 MMOL/L — LOW (ref 21–28)
HCO3 BLDA-SCNC: 20 MMOL/L — LOW (ref 21–28)
HCO3 BLDA-SCNC: 22 MMOL/L — SIGNIFICANT CHANGE UP (ref 21–28)
HCO3 BLDA-SCNC: 22 MMOL/L — SIGNIFICANT CHANGE UP (ref 21–28)
HCO3 BLDA-SCNC: 24 MMOL/L — SIGNIFICANT CHANGE UP (ref 21–28)
HCO3 BLDA-SCNC: 25 MMOL/L — SIGNIFICANT CHANGE UP (ref 21–28)
HCO3 BLDV-SCNC: 21 MMOL/L — LOW (ref 22–29)
HCO3 BLDV-SCNC: 22 MMOL/L — SIGNIFICANT CHANGE UP (ref 22–29)
HCO3 BLDV-SCNC: 26 MMOL/L — SIGNIFICANT CHANGE UP (ref 22–29)
HCT VFR BLD CALC: 39 % — SIGNIFICANT CHANGE UP (ref 39–50)
HCT VFR BLDA CALC: 27 % — SIGNIFICANT CHANGE UP
HCT VFR BLDA CALC: 35 % — SIGNIFICANT CHANGE UP
HCT VFR BLDA CALC: 36 % — SIGNIFICANT CHANGE UP
HCT VFR BLDA CALC: 37 % — SIGNIFICANT CHANGE UP
HCT VFR BLDA CALC: 38 % — SIGNIFICANT CHANGE UP
HCT VFR BLDA CALC: 44 % — SIGNIFICANT CHANGE UP
HCT VFR BLDA CALC: 45 % — SIGNIFICANT CHANGE UP
HGB BLD CALC-MCNC: 11.7 G/DL — LOW (ref 12.6–17.4)
HGB BLD CALC-MCNC: 12.3 G/DL — LOW (ref 12.6–17.4)
HGB BLD CALC-MCNC: 9.1 G/DL — LOW (ref 12.6–17.4)
HGB BLD-MCNC: 13.8 G/DL — SIGNIFICANT CHANGE UP (ref 13–17)
HGB BLDA-MCNC: 12.1 G/DL — LOW (ref 12.6–17.4)
HGB BLDA-MCNC: 12.3 G/DL — LOW (ref 12.6–17.4)
HGB BLDA-MCNC: 12.3 G/DL — LOW (ref 12.6–17.4)
HGB BLDA-MCNC: 12.6 G/DL — SIGNIFICANT CHANGE UP (ref 12.6–17.4)
HGB BLDA-MCNC: 14.7 G/DL — SIGNIFICANT CHANGE UP (ref 12.6–17.4)
HGB BLDA-MCNC: 15.1 G/DL — SIGNIFICANT CHANGE UP (ref 12.6–17.4)
INR BLD: 1.28 RATIO — HIGH (ref 0.88–1.16)
LACTATE BLDA-MCNC: 0.9 MMOL/L — SIGNIFICANT CHANGE UP (ref 0.5–2)
LACTATE BLDA-MCNC: 1.2 MMOL/L — SIGNIFICANT CHANGE UP (ref 0.5–2)
LACTATE BLDA-MCNC: 1.4 MMOL/L — SIGNIFICANT CHANGE UP (ref 0.5–2)
LACTATE BLDA-MCNC: 2.4 MMOL/L — HIGH (ref 0.5–2)
LACTATE BLDA-MCNC: 3 MMOL/L — HIGH (ref 0.5–2)
LACTATE BLDA-MCNC: 3.4 MMOL/L — HIGH (ref 0.5–2)
LACTATE BLDV-MCNC: 1.4 MMOL/L — SIGNIFICANT CHANGE UP (ref 0.5–2)
LACTATE BLDV-MCNC: 2.1 MMOL/L — HIGH (ref 0.5–2)
LACTATE BLDV-MCNC: 3.1 MMOL/L — HIGH (ref 0.5–2)
MAGNESIUM SERPL-MCNC: 2.4 MG/DL — SIGNIFICANT CHANGE UP (ref 1.8–2.6)
MCHC RBC-ENTMCNC: 30.4 PG — SIGNIFICANT CHANGE UP (ref 27–34)
MCHC RBC-ENTMCNC: 35.4 GM/DL — SIGNIFICANT CHANGE UP (ref 32–36)
MCV RBC AUTO: 85.9 FL — SIGNIFICANT CHANGE UP (ref 80–100)
METHGB MFR BLDA: 0.7 % — SIGNIFICANT CHANGE UP
METHGB MFR BLDA: 0.7 % — SIGNIFICANT CHANGE UP
METHGB MFR BLDA: 0.9 % — SIGNIFICANT CHANGE UP
METHGB MFR BLDA: 1.1 % — SIGNIFICANT CHANGE UP
METHGB MFR BLDV: 0.5 % — SIGNIFICANT CHANGE UP
METHGB MFR BLDV: 0.8 % — SIGNIFICANT CHANGE UP
METHGB MFR BLDV: 1 % — SIGNIFICANT CHANGE UP
OXYHGB MFR BLDA: 97 % — HIGH (ref 90–95)
OXYHGB MFR BLDA: 97 % — HIGH (ref 90–95)
OXYHGB MFR BLDA: 98 % — HIGH (ref 90–95)
PCO2 BLDA: 33 MMHG — LOW (ref 35–48)
PCO2 BLDA: 33 MMHG — LOW (ref 35–48)
PCO2 BLDA: 35 MMHG — SIGNIFICANT CHANGE UP (ref 35–48)
PCO2 BLDA: 36 MMHG — SIGNIFICANT CHANGE UP (ref 35–48)
PCO2 BLDA: 40 MMHG — SIGNIFICANT CHANGE UP (ref 35–48)
PCO2 BLDA: 43 MMHG — SIGNIFICANT CHANGE UP (ref 35–48)
PCO2 BLDV: 37 MMHG — LOW (ref 42–55)
PCO2 BLDV: 38 MMHG — LOW (ref 42–55)
PCO2 BLDV: 39 MMHG — LOW (ref 42–55)
PH BLDA: 7.36 — SIGNIFICANT CHANGE UP (ref 7.35–7.45)
PH BLDA: 7.37 — SIGNIFICANT CHANGE UP (ref 7.35–7.45)
PH BLDA: 7.39 — SIGNIFICANT CHANGE UP (ref 7.35–7.45)
PH BLDA: 7.4 — SIGNIFICANT CHANGE UP (ref 7.35–7.45)
PH BLDA: 7.4 — SIGNIFICANT CHANGE UP (ref 7.35–7.45)
PH BLDA: 7.41 — SIGNIFICANT CHANGE UP (ref 7.35–7.45)
PH BLDV: 7.36 — SIGNIFICANT CHANGE UP (ref 7.32–7.43)
PH BLDV: 7.36 — SIGNIFICANT CHANGE UP (ref 7.32–7.43)
PH BLDV: 7.44 — HIGH (ref 7.32–7.43)
PLATELET # BLD AUTO: 135 K/UL — LOW (ref 150–400)
PO2 BLDA: 240 MMHG — HIGH (ref 83–108)
PO2 BLDA: 261 MMHG — HIGH (ref 83–108)
PO2 BLDA: 424 MMHG — HIGH (ref 83–108)
PO2 BLDA: >496 MMHG — HIGH (ref 83–108)
PO2 BLDV: 50 MMHG — HIGH (ref 25–45)
PO2 BLDV: 66 MMHG — HIGH (ref 25–45)
PO2 BLDV: 75 MMHG — HIGH (ref 25–45)
POTASSIUM BLDA-SCNC: 3.6 MMOL/L — SIGNIFICANT CHANGE UP (ref 3.5–5.1)
POTASSIUM BLDA-SCNC: 4.4 MMOL/L — SIGNIFICANT CHANGE UP (ref 3.5–5.1)
POTASSIUM BLDA-SCNC: 4.4 MMOL/L — SIGNIFICANT CHANGE UP (ref 3.5–5.1)
POTASSIUM BLDA-SCNC: 5.3 MMOL/L — HIGH (ref 3.5–5.1)
POTASSIUM BLDA-SCNC: 5.4 MMOL/L — HIGH (ref 3.5–5.1)
POTASSIUM BLDA-SCNC: 5.5 MMOL/L — HIGH (ref 3.5–5.1)
POTASSIUM BLDV-SCNC: 4.4 MMOL/L — SIGNIFICANT CHANGE UP (ref 3.5–5.1)
POTASSIUM BLDV-SCNC: 5.4 MMOL/L — HIGH (ref 3.5–5.1)
POTASSIUM BLDV-SCNC: 5.7 MMOL/L — HIGH (ref 3.5–5.1)
POTASSIUM SERPL-MCNC: 4.4 MMOL/L — SIGNIFICANT CHANGE UP (ref 3.5–5.3)
POTASSIUM SERPL-SCNC: 4.4 MMOL/L — SIGNIFICANT CHANGE UP (ref 3.5–5.3)
PROT SERPL-MCNC: 5.4 G/DL — LOW (ref 6.6–8.7)
PROTHROM AB SERPL-ACNC: 14.9 SEC — HIGH (ref 10.5–13.4)
RBC # BLD: 4.54 M/UL — SIGNIFICANT CHANGE UP (ref 4.2–5.8)
RBC # FLD: 11.9 % — SIGNIFICANT CHANGE UP (ref 10.3–14.5)
SAO2 % BLDA: 100 % — HIGH (ref 94–98)
SAO2 % BLDA: 99.7 % — HIGH (ref 94–98)
SAO2 % BLDA: 99.7 % — HIGH (ref 94–98)
SAO2 % BLDA: 99.9 % — HIGH (ref 94–98)
SAO2 % BLDV: 87.3 % — SIGNIFICANT CHANGE UP (ref 67–88)
SAO2 % BLDV: 94.2 % — HIGH (ref 67–88)
SAO2 % BLDV: 98 % — HIGH (ref 67–88)
SODIUM BLDA-SCNC: 131 MMOL/L — LOW (ref 136–145)
SODIUM BLDA-SCNC: 134 MMOL/L — LOW (ref 136–145)
SODIUM BLDA-SCNC: 135 MMOL/L — LOW (ref 136–145)
SODIUM BLDA-SCNC: 136 MMOL/L — SIGNIFICANT CHANGE UP (ref 136–145)
SODIUM BLDA-SCNC: 136 MMOL/L — SIGNIFICANT CHANGE UP (ref 136–145)
SODIUM BLDA-SCNC: 137 MMOL/L — SIGNIFICANT CHANGE UP (ref 136–145)
SODIUM SERPL-SCNC: 141 MMOL/L — SIGNIFICANT CHANGE UP (ref 135–145)
WBC # BLD: 14.84 K/UL — HIGH (ref 3.8–10.5)
WBC # FLD AUTO: 14.84 K/UL — HIGH (ref 3.8–10.5)

## 2023-01-27 PROCEDURE — 71045 X-RAY EXAM CHEST 1 VIEW: CPT | Mod: 26

## 2023-01-27 PROCEDURE — 33533 CABG ARTERIAL SINGLE: CPT

## 2023-01-27 PROCEDURE — 99291 CRITICAL CARE FIRST HOUR: CPT

## 2023-01-27 PROCEDURE — 33508 ENDOSCOPIC VEIN HARVEST: CPT | Mod: 59

## 2023-01-27 PROCEDURE — 93010 ELECTROCARDIOGRAM REPORT: CPT

## 2023-01-27 PROCEDURE — 33533 CABG ARTERIAL SINGLE: CPT | Mod: AS

## 2023-01-27 PROCEDURE — 33519 CABG ARTERY-VEIN THREE: CPT | Mod: AS

## 2023-01-27 PROCEDURE — 33519 CABG ARTERY-VEIN THREE: CPT

## 2023-01-27 DEVICE — OCCLUDER INTERNAL VESSEL FLO-RESTER 1 X 12MM: Type: IMPLANTABLE DEVICE | Status: FUNCTIONAL

## 2023-01-27 DEVICE — KIT A-LINE 1LUM 20G X 12CM SAFE KIT: Type: IMPLANTABLE DEVICE | Status: FUNCTIONAL

## 2023-01-27 DEVICE — IMPLANTABLE DEVICE: Type: IMPLANTABLE DEVICE | Status: FUNCTIONAL

## 2023-01-27 DEVICE — CANNULA VENOUS 2 STAGE THIN FLEX 29/29FR X 3/8" NON-VENTED: Type: IMPLANTABLE DEVICE | Status: FUNCTIONAL

## 2023-01-27 DEVICE — MEDIASTINAL CATH DRAIN 9MM: Type: IMPLANTABLE DEVICE | Status: FUNCTIONAL

## 2023-01-27 DEVICE — PACING WIRE ORANGE M-25 WINGED WIRE 37MM X 89MM: Type: IMPLANTABLE DEVICE | Status: FUNCTIONAL

## 2023-01-27 DEVICE — CANNULA ARTERIAL STRAIGHT 20FR X 3/8" VENTED: Type: IMPLANTABLE DEVICE | Status: FUNCTIONAL

## 2023-01-27 DEVICE — CANNULA VESSEL 3MM BLUNT TIP CLEAR 1-WAY VALVE: Type: IMPLANTABLE DEVICE | Status: FUNCTIONAL

## 2023-01-27 DEVICE — CANNULA AORTIC ROOT WITH VENT LINE 12G X 14CM FLANGED: Type: IMPLANTABLE DEVICE | Status: FUNCTIONAL

## 2023-01-27 DEVICE — SURGICEL FIBRILLAR 4 X 4": Type: IMPLANTABLE DEVICE | Status: FUNCTIONAL

## 2023-01-27 DEVICE — TISSEEL 4ML: Type: IMPLANTABLE DEVICE | Status: FUNCTIONAL

## 2023-01-27 DEVICE — PACING WIRE WHITE M-25 WINGED WIRE 37MM X 89MM: Type: IMPLANTABLE DEVICE | Status: FUNCTIONAL

## 2023-01-27 DEVICE — BONE WAX 2.5GM: Type: IMPLANTABLE DEVICE | Status: FUNCTIONAL

## 2023-01-27 RX ORDER — VANCOMYCIN HCL 1 G
1000 VIAL (EA) INTRAVENOUS EVERY 12 HOURS
Refills: 0 | Status: COMPLETED | OUTPATIENT
Start: 2023-01-27 | End: 2023-01-29

## 2023-01-27 RX ORDER — CHLORHEXIDINE GLUCONATE 213 G/1000ML
1 SOLUTION TOPICAL DAILY
Refills: 0 | Status: DISCONTINUED | OUTPATIENT
Start: 2023-01-27 | End: 2023-02-01

## 2023-01-27 RX ORDER — ONDANSETRON 8 MG/1
4 TABLET, FILM COATED ORAL EVERY 6 HOURS
Refills: 0 | Status: DISCONTINUED | OUTPATIENT
Start: 2023-01-27 | End: 2023-02-01

## 2023-01-27 RX ORDER — NOREPINEPHRINE BITARTRATE/D5W 8 MG/250ML
0.05 PLASTIC BAG, INJECTION (ML) INTRAVENOUS
Qty: 8 | Refills: 0 | Status: DISCONTINUED | OUTPATIENT
Start: 2023-01-27 | End: 2023-01-28

## 2023-01-27 RX ORDER — CALCIUM GLUCONATE 100 MG/ML
2 VIAL (ML) INTRAVENOUS ONCE
Refills: 0 | Status: COMPLETED | OUTPATIENT
Start: 2023-01-27 | End: 2023-01-27

## 2023-01-27 RX ORDER — SENNA PLUS 8.6 MG/1
2 TABLET ORAL AT BEDTIME
Refills: 0 | Status: DISCONTINUED | OUTPATIENT
Start: 2023-01-27 | End: 2023-02-01

## 2023-01-27 RX ORDER — HYDROMORPHONE HYDROCHLORIDE 2 MG/ML
0.25 INJECTION INTRAMUSCULAR; INTRAVENOUS; SUBCUTANEOUS ONCE
Refills: 0 | Status: DISCONTINUED | OUTPATIENT
Start: 2023-01-27 | End: 2023-01-27

## 2023-01-27 RX ORDER — SODIUM CHLORIDE 9 MG/ML
1000 INJECTION INTRAMUSCULAR; INTRAVENOUS; SUBCUTANEOUS
Refills: 0 | Status: DISCONTINUED | OUTPATIENT
Start: 2023-01-27 | End: 2023-01-29

## 2023-01-27 RX ORDER — PANTOPRAZOLE SODIUM 20 MG/1
40 TABLET, DELAYED RELEASE ORAL DAILY
Refills: 0 | Status: DISCONTINUED | OUTPATIENT
Start: 2023-01-28 | End: 2023-02-01

## 2023-01-27 RX ORDER — SODIUM CHLORIDE 9 MG/ML
1000 INJECTION, SOLUTION INTRAVENOUS
Refills: 0 | Status: DISCONTINUED | OUTPATIENT
Start: 2023-01-27 | End: 2023-01-29

## 2023-01-27 RX ORDER — ONDANSETRON 8 MG/1
4 TABLET, FILM COATED ORAL ONCE
Refills: 0 | Status: DISCONTINUED | OUTPATIENT
Start: 2023-01-27 | End: 2023-01-27

## 2023-01-27 RX ORDER — PANTOPRAZOLE SODIUM 20 MG/1
40 TABLET, DELAYED RELEASE ORAL ONCE
Refills: 0 | Status: COMPLETED | OUTPATIENT
Start: 2023-01-27 | End: 2023-01-27

## 2023-01-27 RX ORDER — ALBUMIN HUMAN 25 %
250 VIAL (ML) INTRAVENOUS
Refills: 0 | Status: COMPLETED | OUTPATIENT
Start: 2023-01-27 | End: 2023-01-27

## 2023-01-27 RX ORDER — GLUCAGON INJECTION, SOLUTION 0.5 MG/.1ML
1 INJECTION, SOLUTION SUBCUTANEOUS ONCE
Refills: 0 | Status: DISCONTINUED | OUTPATIENT
Start: 2023-01-27 | End: 2023-01-29

## 2023-01-27 RX ORDER — ASPIRIN/CALCIUM CARB/MAGNESIUM 324 MG
81 TABLET ORAL ONCE
Refills: 0 | Status: COMPLETED | OUTPATIENT
Start: 2023-01-27 | End: 2023-01-27

## 2023-01-27 RX ORDER — ATORVASTATIN CALCIUM 80 MG/1
80 TABLET, FILM COATED ORAL AT BEDTIME
Refills: 0 | Status: DISCONTINUED | OUTPATIENT
Start: 2023-01-27 | End: 2023-02-01

## 2023-01-27 RX ORDER — FENTANYL CITRATE 50 UG/ML
50 INJECTION INTRAVENOUS ONCE
Refills: 0 | Status: DISCONTINUED | OUTPATIENT
Start: 2023-01-27 | End: 2023-01-27

## 2023-01-27 RX ORDER — DEXTROSE 50 % IN WATER 50 %
15 SYRINGE (ML) INTRAVENOUS ONCE
Refills: 0 | Status: DISCONTINUED | OUTPATIENT
Start: 2023-01-27 | End: 2023-01-29

## 2023-01-27 RX ORDER — IPRATROPIUM/ALBUTEROL SULFATE 18-103MCG
3 AEROSOL WITH ADAPTER (GRAM) INHALATION EVERY 6 HOURS
Refills: 0 | Status: DISCONTINUED | OUTPATIENT
Start: 2023-01-27 | End: 2023-01-28

## 2023-01-27 RX ORDER — INSULIN HUMAN 100 [IU]/ML
3 INJECTION, SOLUTION SUBCUTANEOUS
Qty: 50 | Refills: 0 | Status: DISCONTINUED | OUTPATIENT
Start: 2023-01-27 | End: 2023-01-28

## 2023-01-27 RX ORDER — OXYCODONE HYDROCHLORIDE 5 MG/1
10 TABLET ORAL EVERY 4 HOURS
Refills: 0 | Status: DISCONTINUED | OUTPATIENT
Start: 2023-01-27 | End: 2023-02-01

## 2023-01-27 RX ORDER — SODIUM CHLORIDE 9 MG/ML
500 INJECTION, SOLUTION INTRAVENOUS ONCE
Refills: 0 | Status: COMPLETED | OUTPATIENT
Start: 2023-01-27 | End: 2023-01-27

## 2023-01-27 RX ORDER — SODIUM CHLORIDE 9 MG/ML
3 INJECTION INTRAMUSCULAR; INTRAVENOUS; SUBCUTANEOUS EVERY 8 HOURS
Refills: 0 | Status: DISCONTINUED | OUTPATIENT
Start: 2023-01-27 | End: 2023-01-27

## 2023-01-27 RX ORDER — INSULIN LISPRO 100/ML
4 VIAL (ML) SUBCUTANEOUS
Refills: 0 | Status: DISCONTINUED | OUTPATIENT
Start: 2023-01-27 | End: 2023-01-28

## 2023-01-27 RX ORDER — DEXTROSE 50 % IN WATER 50 %
25 SYRINGE (ML) INTRAVENOUS
Refills: 0 | Status: DISCONTINUED | OUTPATIENT
Start: 2023-01-27 | End: 2023-01-29

## 2023-01-27 RX ORDER — POTASSIUM CHLORIDE 20 MEQ
10 PACKET (EA) ORAL
Refills: 0 | Status: DISCONTINUED | OUTPATIENT
Start: 2023-01-27 | End: 2023-01-29

## 2023-01-27 RX ORDER — CEFUROXIME AXETIL 250 MG
1500 TABLET ORAL EVERY 8 HOURS
Refills: 0 | Status: DISCONTINUED | OUTPATIENT
Start: 2023-01-27 | End: 2023-01-27

## 2023-01-27 RX ORDER — ONDANSETRON 8 MG/1
4 TABLET, FILM COATED ORAL EVERY 6 HOURS
Refills: 0 | Status: DISCONTINUED | OUTPATIENT
Start: 2023-01-27 | End: 2023-01-27

## 2023-01-27 RX ORDER — CEFUROXIME AXETIL 250 MG
1500 TABLET ORAL EVERY 8 HOURS
Refills: 0 | Status: COMPLETED | OUTPATIENT
Start: 2023-01-28 | End: 2023-01-29

## 2023-01-27 RX ORDER — CHLORHEXIDINE GLUCONATE 213 G/1000ML
5 SOLUTION TOPICAL
Refills: 0 | Status: DISCONTINUED | OUTPATIENT
Start: 2023-01-27 | End: 2023-01-29

## 2023-01-27 RX ORDER — OXYCODONE HYDROCHLORIDE 5 MG/1
5 TABLET ORAL EVERY 4 HOURS
Refills: 0 | Status: DISCONTINUED | OUTPATIENT
Start: 2023-01-27 | End: 2023-02-01

## 2023-01-27 RX ORDER — POLYETHYLENE GLYCOL 3350 17 G/17G
17 POWDER, FOR SOLUTION ORAL
Refills: 0 | Status: DISCONTINUED | OUTPATIENT
Start: 2023-01-27 | End: 2023-02-01

## 2023-01-27 RX ORDER — VANCOMYCIN HCL 1 G
1000 VIAL (EA) INTRAVENOUS EVERY 12 HOURS
Refills: 0 | Status: DISCONTINUED | OUTPATIENT
Start: 2023-01-27 | End: 2023-01-27

## 2023-01-27 RX ORDER — ACETAMINOPHEN 500 MG
975 TABLET ORAL EVERY 6 HOURS
Refills: 0 | Status: COMPLETED | OUTPATIENT
Start: 2023-01-28 | End: 2023-01-30

## 2023-01-27 RX ORDER — ASPIRIN/CALCIUM CARB/MAGNESIUM 324 MG
81 TABLET ORAL DAILY
Refills: 0 | Status: DISCONTINUED | OUTPATIENT
Start: 2023-01-28 | End: 2023-02-01

## 2023-01-27 RX ORDER — POTASSIUM CHLORIDE 20 MEQ
10 PACKET (EA) ORAL
Refills: 0 | Status: COMPLETED | OUTPATIENT
Start: 2023-01-27 | End: 2023-01-27

## 2023-01-27 RX ORDER — ROSUVASTATIN CALCIUM 5 MG/1
1 TABLET ORAL
Qty: 0 | Refills: 0 | DISCHARGE

## 2023-01-27 RX ORDER — SODIUM CHLORIDE 9 MG/ML
500 INJECTION, SOLUTION INTRAVENOUS
Refills: 0 | Status: COMPLETED | OUTPATIENT
Start: 2023-01-27 | End: 2023-01-27

## 2023-01-27 RX ORDER — DEXTROSE 50 % IN WATER 50 %
50 SYRINGE (ML) INTRAVENOUS
Refills: 0 | Status: DISCONTINUED | OUTPATIENT
Start: 2023-01-27 | End: 2023-01-29

## 2023-01-27 RX ADMIN — FENTANYL CITRATE 50 MICROGRAM(S): 50 INJECTION INTRAVENOUS at 17:01

## 2023-01-27 RX ADMIN — Medication 100 MILLIEQUIVALENT(S): at 20:42

## 2023-01-27 RX ADMIN — FENTANYL CITRATE 50 MICROGRAM(S): 50 INJECTION INTRAVENOUS at 17:30

## 2023-01-27 RX ADMIN — HYDROMORPHONE HYDROCHLORIDE 0.25 MILLIGRAM(S): 2 INJECTION INTRAMUSCULAR; INTRAVENOUS; SUBCUTANEOUS at 19:57

## 2023-01-27 RX ADMIN — Medication 125 MILLILITER(S): at 19:15

## 2023-01-27 RX ADMIN — Medication 3 MILLILITER(S): at 16:49

## 2023-01-27 RX ADMIN — SODIUM CHLORIDE 500 MILLILITER(S): 9 INJECTION, SOLUTION INTRAVENOUS at 17:00

## 2023-01-27 RX ADMIN — SODIUM CHLORIDE 10 MILLILITER(S): 9 INJECTION INTRAMUSCULAR; INTRAVENOUS; SUBCUTANEOUS at 15:15

## 2023-01-27 RX ADMIN — ATORVASTATIN CALCIUM 80 MILLIGRAM(S): 80 TABLET, FILM COATED ORAL at 21:09

## 2023-01-27 RX ADMIN — Medication 200 GRAM(S): at 19:00

## 2023-01-27 RX ADMIN — Medication 125 MILLILITER(S): at 17:02

## 2023-01-27 RX ADMIN — Medication 100 MILLIGRAM(S): at 17:18

## 2023-01-27 RX ADMIN — Medication 3 MILLILITER(S): at 20:33

## 2023-01-27 RX ADMIN — SODIUM CHLORIDE 500 MILLILITER(S): 9 INJECTION, SOLUTION INTRAVENOUS at 16:14

## 2023-01-27 RX ADMIN — INSULIN HUMAN 3 UNIT(S)/HR: 100 INJECTION, SOLUTION SUBCUTANEOUS at 16:19

## 2023-01-27 RX ADMIN — CHLORHEXIDINE GLUCONATE 5 MILLILITER(S): 213 SOLUTION TOPICAL at 18:23

## 2023-01-27 RX ADMIN — FENTANYL CITRATE 50 MICROGRAM(S): 50 INJECTION INTRAVENOUS at 15:55

## 2023-01-27 RX ADMIN — Medication 8.81 MICROGRAM(S)/KG/MIN: at 16:16

## 2023-01-27 RX ADMIN — HYDROMORPHONE HYDROCHLORIDE 0.25 MILLIGRAM(S): 2 INJECTION INTRAMUSCULAR; INTRAVENOUS; SUBCUTANEOUS at 21:36

## 2023-01-27 RX ADMIN — Medication 125 MILLILITER(S): at 17:31

## 2023-01-27 RX ADMIN — CHLORHEXIDINE GLUCONATE 1 APPLICATION(S): 213 SOLUTION TOPICAL at 16:25

## 2023-01-27 RX ADMIN — Medication 81 MILLIGRAM(S): at 21:09

## 2023-01-27 RX ADMIN — PANTOPRAZOLE SODIUM 40 MILLIGRAM(S): 20 TABLET, DELAYED RELEASE ORAL at 16:24

## 2023-01-27 RX ADMIN — Medication 100 MILLIEQUIVALENT(S): at 20:07

## 2023-01-27 RX ADMIN — Medication 125 MILLILITER(S): at 18:55

## 2023-01-27 RX ADMIN — FENTANYL CITRATE 50 MICROGRAM(S): 50 INJECTION INTRAVENOUS at 15:32

## 2023-01-27 RX ADMIN — SODIUM CHLORIDE 5 MILLILITER(S): 9 INJECTION INTRAMUSCULAR; INTRAVENOUS; SUBCUTANEOUS at 16:15

## 2023-01-27 RX ADMIN — HYDROMORPHONE HYDROCHLORIDE 0.25 MILLIGRAM(S): 2 INJECTION INTRAMUSCULAR; INTRAVENOUS; SUBCUTANEOUS at 21:51

## 2023-01-27 RX ADMIN — Medication 250 MILLIGRAM(S): at 21:30

## 2023-01-27 RX ADMIN — SODIUM CHLORIDE 2000 MILLILITER(S): 9 INJECTION, SOLUTION INTRAVENOUS at 21:20

## 2023-01-27 RX ADMIN — HYDROMORPHONE HYDROCHLORIDE 0.25 MILLIGRAM(S): 2 INJECTION INTRAMUSCULAR; INTRAVENOUS; SUBCUTANEOUS at 19:42

## 2023-01-27 NOTE — ASU PREOP CHECKLIST - BP NONINVASIVE SYSTOLIC (MM HG)
Detail Level: Detailed Quality 226: Preventive Care And Screening: Tobacco Use: Screening And Cessation Intervention: Patient screened for tobacco use and is an ex/non-smoker Quality 111:Pneumonia Vaccination Status For Older Adults: Pneumococcal Vaccination Previously Received 139

## 2023-01-28 LAB
ALBUMIN SERPL ELPH-MCNC: 4.4 G/DL — SIGNIFICANT CHANGE UP (ref 3.3–5.2)
ALP SERPL-CCNC: 30 U/L — LOW (ref 40–120)
ALT FLD-CCNC: 17 U/L — SIGNIFICANT CHANGE UP
ANION GAP SERPL CALC-SCNC: 12 MMOL/L — SIGNIFICANT CHANGE UP (ref 5–17)
APTT BLD: 25.5 SEC — LOW (ref 27.5–35.5)
AST SERPL-CCNC: 35 U/L — SIGNIFICANT CHANGE UP
BILIRUB DIRECT SERPL-MCNC: 0.2 MG/DL — SIGNIFICANT CHANGE UP (ref 0–0.3)
BILIRUB INDIRECT FLD-MCNC: 0.4 MG/DL — SIGNIFICANT CHANGE UP (ref 0.2–1)
BILIRUB SERPL-MCNC: 0.6 MG/DL — SIGNIFICANT CHANGE UP (ref 0.4–2)
BUN SERPL-MCNC: 14.4 MG/DL — SIGNIFICANT CHANGE UP (ref 8–20)
CALCIUM SERPL-MCNC: 8.9 MG/DL — SIGNIFICANT CHANGE UP (ref 8.4–10.5)
CHLORIDE SERPL-SCNC: 106 MMOL/L — SIGNIFICANT CHANGE UP (ref 96–108)
CK MB CFR SERPL CALC: 28.3 NG/ML — HIGH (ref 0–6.7)
CK SERPL-CCNC: 393 U/L — HIGH (ref 30–200)
CO2 SERPL-SCNC: 22 MMOL/L — SIGNIFICANT CHANGE UP (ref 22–29)
CREAT SERPL-MCNC: 0.88 MG/DL — SIGNIFICANT CHANGE UP (ref 0.5–1.3)
EGFR: 95 ML/MIN/1.73M2 — SIGNIFICANT CHANGE UP
GAS PNL BLDA: SIGNIFICANT CHANGE UP
GLUCOSE BLDC GLUCOMTR-MCNC: 102 MG/DL — HIGH (ref 70–99)
GLUCOSE BLDC GLUCOMTR-MCNC: 111 MG/DL — HIGH (ref 70–99)
GLUCOSE BLDC GLUCOMTR-MCNC: 112 MG/DL — HIGH (ref 70–99)
GLUCOSE BLDC GLUCOMTR-MCNC: 121 MG/DL — HIGH (ref 70–99)
GLUCOSE BLDC GLUCOMTR-MCNC: 123 MG/DL — HIGH (ref 70–99)
GLUCOSE BLDC GLUCOMTR-MCNC: 124 MG/DL — HIGH (ref 70–99)
GLUCOSE BLDC GLUCOMTR-MCNC: 137 MG/DL — HIGH (ref 70–99)
GLUCOSE BLDC GLUCOMTR-MCNC: 143 MG/DL — HIGH (ref 70–99)
GLUCOSE BLDC GLUCOMTR-MCNC: 147 MG/DL — HIGH (ref 70–99)
GLUCOSE BLDC GLUCOMTR-MCNC: 149 MG/DL — HIGH (ref 70–99)
GLUCOSE BLDC GLUCOMTR-MCNC: 152 MG/DL — HIGH (ref 70–99)
GLUCOSE BLDC GLUCOMTR-MCNC: 167 MG/DL — HIGH (ref 70–99)
GLUCOSE SERPL-MCNC: 113 MG/DL — HIGH (ref 70–99)
HCT VFR BLD CALC: 32.1 % — LOW (ref 39–50)
HGB BLD-MCNC: 11.5 G/DL — LOW (ref 13–17)
INR BLD: 1.27 RATIO — HIGH (ref 0.88–1.16)
LACTATE SERPL-SCNC: 2.6 MMOL/L — HIGH (ref 0.5–2)
MAGNESIUM SERPL-MCNC: 1.9 MG/DL — SIGNIFICANT CHANGE UP (ref 1.6–2.6)
MCHC RBC-ENTMCNC: 30.7 PG — SIGNIFICANT CHANGE UP (ref 27–34)
MCHC RBC-ENTMCNC: 35.8 GM/DL — SIGNIFICANT CHANGE UP (ref 32–36)
MCV RBC AUTO: 85.6 FL — SIGNIFICANT CHANGE UP (ref 80–100)
PLATELET # BLD AUTO: 119 K/UL — LOW (ref 150–400)
POTASSIUM SERPL-MCNC: 4 MMOL/L — SIGNIFICANT CHANGE UP (ref 3.5–5.3)
POTASSIUM SERPL-SCNC: 4 MMOL/L — SIGNIFICANT CHANGE UP (ref 3.5–5.3)
PROT SERPL-MCNC: 5.8 G/DL — LOW (ref 6.6–8.7)
PROTHROM AB SERPL-ACNC: 14.8 SEC — HIGH (ref 10.5–13.4)
RBC # BLD: 3.75 M/UL — LOW (ref 4.2–5.8)
RBC # FLD: 12 % — SIGNIFICANT CHANGE UP (ref 10.3–14.5)
SODIUM SERPL-SCNC: 140 MMOL/L — SIGNIFICANT CHANGE UP (ref 135–145)
TROPONIN T SERPL-MCNC: 0.25 NG/ML — HIGH (ref 0–0.06)
WBC # BLD: 12.4 K/UL — HIGH (ref 3.8–10.5)
WBC # FLD AUTO: 12.4 K/UL — HIGH (ref 3.8–10.5)

## 2023-01-28 PROCEDURE — 93010 ELECTROCARDIOGRAM REPORT: CPT

## 2023-01-28 PROCEDURE — 99291 CRITICAL CARE FIRST HOUR: CPT

## 2023-01-28 PROCEDURE — 71045 X-RAY EXAM CHEST 1 VIEW: CPT | Mod: 26

## 2023-01-28 PROCEDURE — 99024 POSTOP FOLLOW-UP VISIT: CPT

## 2023-01-28 RX ORDER — CHLORPROMAZINE HCL 10 MG
25 TABLET ORAL ONCE
Refills: 0 | Status: COMPLETED | OUTPATIENT
Start: 2023-01-28 | End: 2023-01-28

## 2023-01-28 RX ORDER — METOPROLOL TARTRATE 50 MG
25 TABLET ORAL
Refills: 0 | Status: DISCONTINUED | OUTPATIENT
Start: 2023-01-28 | End: 2023-02-01

## 2023-01-28 RX ORDER — INSULIN GLARGINE 100 [IU]/ML
37 INJECTION, SOLUTION SUBCUTANEOUS ONCE
Refills: 0 | Status: COMPLETED | OUTPATIENT
Start: 2023-01-28 | End: 2023-01-28

## 2023-01-28 RX ORDER — ENOXAPARIN SODIUM 100 MG/ML
40 INJECTION SUBCUTANEOUS EVERY 24 HOURS
Refills: 0 | Status: DISCONTINUED | OUTPATIENT
Start: 2023-01-28 | End: 2023-01-30

## 2023-01-28 RX ORDER — MAGNESIUM SULFATE 500 MG/ML
1 VIAL (ML) INJECTION ONCE
Refills: 0 | Status: COMPLETED | OUTPATIENT
Start: 2023-01-28 | End: 2023-01-28

## 2023-01-28 RX ORDER — POTASSIUM CHLORIDE 20 MEQ
10 PACKET (EA) ORAL ONCE
Refills: 0 | Status: COMPLETED | OUTPATIENT
Start: 2023-01-28 | End: 2023-01-28

## 2023-01-28 RX ORDER — INSULIN LISPRO 100/ML
VIAL (ML) SUBCUTANEOUS
Refills: 0 | Status: DISCONTINUED | OUTPATIENT
Start: 2023-01-28 | End: 2023-01-29

## 2023-01-28 RX ORDER — HYDROMORPHONE HYDROCHLORIDE 2 MG/ML
0.25 INJECTION INTRAMUSCULAR; INTRAVENOUS; SUBCUTANEOUS ONCE
Refills: 0 | Status: DISCONTINUED | OUTPATIENT
Start: 2023-01-28 | End: 2023-01-28

## 2023-01-28 RX ORDER — INFLUENZA VIRUS VACCINE 15; 15; 15; 15 UG/.5ML; UG/.5ML; UG/.5ML; UG/.5ML
0.7 SUSPENSION INTRAMUSCULAR ONCE
Refills: 0 | Status: DISCONTINUED | OUTPATIENT
Start: 2023-01-28 | End: 2023-01-30

## 2023-01-28 RX ORDER — HYDROMORPHONE HYDROCHLORIDE 2 MG/ML
0.25 INJECTION INTRAMUSCULAR; INTRAVENOUS; SUBCUTANEOUS
Refills: 0 | Status: DISCONTINUED | OUTPATIENT
Start: 2023-01-28 | End: 2023-01-30

## 2023-01-28 RX ORDER — GABAPENTIN 400 MG/1
300 CAPSULE ORAL THREE TIMES A DAY
Refills: 0 | Status: DISCONTINUED | OUTPATIENT
Start: 2023-01-28 | End: 2023-02-01

## 2023-01-28 RX ORDER — METOCLOPRAMIDE HCL 10 MG
10 TABLET ORAL ONCE
Refills: 0 | Status: COMPLETED | OUTPATIENT
Start: 2023-01-28 | End: 2023-01-28

## 2023-01-28 RX ADMIN — Medication 975 MILLIGRAM(S): at 05:39

## 2023-01-28 RX ADMIN — HYDROMORPHONE HYDROCHLORIDE 0.25 MILLIGRAM(S): 2 INJECTION INTRAMUSCULAR; INTRAVENOUS; SUBCUTANEOUS at 14:08

## 2023-01-28 RX ADMIN — HYDROMORPHONE HYDROCHLORIDE 0.25 MILLIGRAM(S): 2 INJECTION INTRAMUSCULAR; INTRAVENOUS; SUBCUTANEOUS at 09:14

## 2023-01-28 RX ADMIN — Medication 975 MILLIGRAM(S): at 12:05

## 2023-01-28 RX ADMIN — OXYCODONE HYDROCHLORIDE 5 MILLIGRAM(S): 5 TABLET ORAL at 02:00

## 2023-01-28 RX ADMIN — INSULIN GLARGINE 37 UNIT(S): 100 INJECTION, SOLUTION SUBCUTANEOUS at 12:38

## 2023-01-28 RX ADMIN — Medication 3 MILLILITER(S): at 08:20

## 2023-01-28 RX ADMIN — GABAPENTIN 300 MILLIGRAM(S): 400 CAPSULE ORAL at 21:03

## 2023-01-28 RX ADMIN — Medication 250 MILLIGRAM(S): at 09:00

## 2023-01-28 RX ADMIN — Medication 100 MILLIGRAM(S): at 00:54

## 2023-01-28 RX ADMIN — GABAPENTIN 300 MILLIGRAM(S): 400 CAPSULE ORAL at 14:08

## 2023-01-28 RX ADMIN — Medication 81 MILLIGRAM(S): at 11:40

## 2023-01-28 RX ADMIN — OXYCODONE HYDROCHLORIDE 10 MILLIGRAM(S): 5 TABLET ORAL at 07:00

## 2023-01-28 RX ADMIN — OXYCODONE HYDROCHLORIDE 10 MILLIGRAM(S): 5 TABLET ORAL at 17:06

## 2023-01-28 RX ADMIN — CHLORHEXIDINE GLUCONATE 5 MILLILITER(S): 213 SOLUTION TOPICAL at 17:08

## 2023-01-28 RX ADMIN — Medication 975 MILLIGRAM(S): at 06:32

## 2023-01-28 RX ADMIN — Medication 25 MILLIGRAM(S): at 16:14

## 2023-01-28 RX ADMIN — POLYETHYLENE GLYCOL 3350 17 GRAM(S): 17 POWDER, FOR SOLUTION ORAL at 05:39

## 2023-01-28 RX ADMIN — PANTOPRAZOLE SODIUM 40 MILLIGRAM(S): 20 TABLET, DELAYED RELEASE ORAL at 11:40

## 2023-01-28 RX ADMIN — OXYCODONE HYDROCHLORIDE 10 MILLIGRAM(S): 5 TABLET ORAL at 10:50

## 2023-01-28 RX ADMIN — SENNA PLUS 2 TABLET(S): 8.6 TABLET ORAL at 21:01

## 2023-01-28 RX ADMIN — INSULIN HUMAN 3 UNIT(S)/HR: 100 INJECTION, SOLUTION SUBCUTANEOUS at 00:54

## 2023-01-28 RX ADMIN — Medication 975 MILLIGRAM(S): at 01:00

## 2023-01-28 RX ADMIN — Medication 50 MILLIEQUIVALENT(S): at 03:15

## 2023-01-28 RX ADMIN — ONDANSETRON 4 MILLIGRAM(S): 8 TABLET, FILM COATED ORAL at 09:08

## 2023-01-28 RX ADMIN — Medication 3 MILLILITER(S): at 03:55

## 2023-01-28 RX ADMIN — ATORVASTATIN CALCIUM 80 MILLIGRAM(S): 80 TABLET, FILM COATED ORAL at 21:01

## 2023-01-28 RX ADMIN — OXYCODONE HYDROCHLORIDE 10 MILLIGRAM(S): 5 TABLET ORAL at 06:10

## 2023-01-28 RX ADMIN — HYDROMORPHONE HYDROCHLORIDE 0.25 MILLIGRAM(S): 2 INJECTION INTRAMUSCULAR; INTRAVENOUS; SUBCUTANEOUS at 14:35

## 2023-01-28 RX ADMIN — POLYETHYLENE GLYCOL 3350 17 GRAM(S): 17 POWDER, FOR SOLUTION ORAL at 17:06

## 2023-01-28 RX ADMIN — Medication 100 MILLIGRAM(S): at 09:00

## 2023-01-28 RX ADMIN — HYDROMORPHONE HYDROCHLORIDE 0.25 MILLIGRAM(S): 2 INJECTION INTRAMUSCULAR; INTRAVENOUS; SUBCUTANEOUS at 04:35

## 2023-01-28 RX ADMIN — Medication 975 MILLIGRAM(S): at 18:09

## 2023-01-28 RX ADMIN — Medication 975 MILLIGRAM(S): at 23:15

## 2023-01-28 RX ADMIN — OXYCODONE HYDROCHLORIDE 10 MILLIGRAM(S): 5 TABLET ORAL at 18:09

## 2023-01-28 RX ADMIN — OXYCODONE HYDROCHLORIDE 5 MILLIGRAM(S): 5 TABLET ORAL at 03:00

## 2023-01-28 RX ADMIN — OXYCODONE HYDROCHLORIDE 10 MILLIGRAM(S): 5 TABLET ORAL at 21:45

## 2023-01-28 RX ADMIN — Medication 100 GRAM(S): at 03:25

## 2023-01-28 RX ADMIN — Medication 975 MILLIGRAM(S): at 00:11

## 2023-01-28 RX ADMIN — Medication 250 MILLIGRAM(S): at 20:55

## 2023-01-28 RX ADMIN — ENOXAPARIN SODIUM 40 MILLIGRAM(S): 100 INJECTION SUBCUTANEOUS at 05:39

## 2023-01-28 RX ADMIN — Medication 25 MILLIGRAM(S): at 09:08

## 2023-01-28 RX ADMIN — CHLORHEXIDINE GLUCONATE 1 APPLICATION(S): 213 SOLUTION TOPICAL at 12:39

## 2023-01-28 RX ADMIN — HYDROMORPHONE HYDROCHLORIDE 0.25 MILLIGRAM(S): 2 INJECTION INTRAMUSCULAR; INTRAVENOUS; SUBCUTANEOUS at 08:59

## 2023-01-28 RX ADMIN — Medication 100 MILLIGRAM(S): at 17:42

## 2023-01-28 RX ADMIN — ONDANSETRON 4 MILLIGRAM(S): 8 TABLET, FILM COATED ORAL at 16:13

## 2023-01-28 RX ADMIN — Medication 975 MILLIGRAM(S): at 11:40

## 2023-01-28 RX ADMIN — Medication 25 MILLIGRAM(S): at 17:06

## 2023-01-28 RX ADMIN — Medication 975 MILLIGRAM(S): at 17:06

## 2023-01-28 RX ADMIN — OXYCODONE HYDROCHLORIDE 10 MILLIGRAM(S): 5 TABLET ORAL at 09:13

## 2023-01-28 RX ADMIN — HYDROMORPHONE HYDROCHLORIDE 0.25 MILLIGRAM(S): 2 INJECTION INTRAMUSCULAR; INTRAVENOUS; SUBCUTANEOUS at 04:20

## 2023-01-28 RX ADMIN — OXYCODONE HYDROCHLORIDE 10 MILLIGRAM(S): 5 TABLET ORAL at 21:00

## 2023-01-28 NOTE — PATIENT PROFILE ADULT - FALL HARM RISK - UNIVERSAL INTERVENTIONS
Bed in lowest position, wheels locked, appropriate side rails in place/Call bell, personal items and telephone in reach/Instruct patient to call for assistance before getting out of bed or chair/Non-slip footwear when patient is out of bed/[place] to call system/Physically safe environment - no spills, clutter or unnecessary equipment/Purposeful Proactive Rounding/Room/bathroom lighting operational, light cord in reach

## 2023-01-28 NOTE — PHYSICAL THERAPY INITIAL EVALUATION ADULT - ADDITIONAL COMMENTS
Pt. states he lives in a private home with 4 steps to enter and full flight to bedroom with handrail.

## 2023-01-29 LAB
ANION GAP SERPL CALC-SCNC: 7 MMOL/L — SIGNIFICANT CHANGE UP (ref 5–17)
BUN SERPL-MCNC: 21.1 MG/DL — HIGH (ref 8–20)
CALCIUM SERPL-MCNC: 8.6 MG/DL — SIGNIFICANT CHANGE UP (ref 8.4–10.5)
CHLORIDE SERPL-SCNC: 106 MMOL/L — SIGNIFICANT CHANGE UP (ref 96–108)
CO2 SERPL-SCNC: 26 MMOL/L — SIGNIFICANT CHANGE UP (ref 22–29)
CREAT SERPL-MCNC: 0.91 MG/DL — SIGNIFICANT CHANGE UP (ref 0.5–1.3)
EGFR: 93 ML/MIN/1.73M2 — SIGNIFICANT CHANGE UP
GLUCOSE BLDC GLUCOMTR-MCNC: 101 MG/DL — HIGH (ref 70–99)
GLUCOSE BLDC GLUCOMTR-MCNC: 117 MG/DL — HIGH (ref 70–99)
GLUCOSE BLDC GLUCOMTR-MCNC: 132 MG/DL — HIGH (ref 70–99)
GLUCOSE SERPL-MCNC: 150 MG/DL — HIGH (ref 70–99)
HCT VFR BLD CALC: 31.1 % — LOW (ref 39–50)
HGB BLD-MCNC: 10.6 G/DL — LOW (ref 13–17)
MAGNESIUM SERPL-MCNC: 2.3 MG/DL — SIGNIFICANT CHANGE UP (ref 1.6–2.6)
MCHC RBC-ENTMCNC: 30.5 PG — SIGNIFICANT CHANGE UP (ref 27–34)
MCHC RBC-ENTMCNC: 34.1 GM/DL — SIGNIFICANT CHANGE UP (ref 32–36)
MCV RBC AUTO: 89.6 FL — SIGNIFICANT CHANGE UP (ref 80–100)
PLATELET # BLD AUTO: 107 K/UL — LOW (ref 150–400)
POTASSIUM SERPL-MCNC: 4.8 MMOL/L — SIGNIFICANT CHANGE UP (ref 3.5–5.3)
POTASSIUM SERPL-SCNC: 4.8 MMOL/L — SIGNIFICANT CHANGE UP (ref 3.5–5.3)
RBC # BLD: 3.47 M/UL — LOW (ref 4.2–5.8)
RBC # FLD: 12.5 % — SIGNIFICANT CHANGE UP (ref 10.3–14.5)
SODIUM SERPL-SCNC: 139 MMOL/L — SIGNIFICANT CHANGE UP (ref 135–145)
WBC # BLD: 13.9 K/UL — HIGH (ref 3.8–10.5)
WBC # FLD AUTO: 13.9 K/UL — HIGH (ref 3.8–10.5)

## 2023-01-29 PROCEDURE — 93010 ELECTROCARDIOGRAM REPORT: CPT

## 2023-01-29 PROCEDURE — 99024 POSTOP FOLLOW-UP VISIT: CPT

## 2023-01-29 PROCEDURE — 71045 X-RAY EXAM CHEST 1 VIEW: CPT | Mod: 26

## 2023-01-29 RX ORDER — SODIUM CHLORIDE 9 MG/ML
3 INJECTION INTRAMUSCULAR; INTRAVENOUS; SUBCUTANEOUS EVERY 8 HOURS
Refills: 0 | Status: DISCONTINUED | OUTPATIENT
Start: 2023-01-29 | End: 2023-02-01

## 2023-01-29 RX ORDER — ALBUMIN HUMAN 25 %
250 VIAL (ML) INTRAVENOUS ONCE
Refills: 0 | Status: COMPLETED | OUTPATIENT
Start: 2023-01-29 | End: 2023-01-29

## 2023-01-29 RX ADMIN — Medication 100 MILLIGRAM(S): at 09:22

## 2023-01-29 RX ADMIN — SENNA PLUS 2 TABLET(S): 8.6 TABLET ORAL at 21:28

## 2023-01-29 RX ADMIN — HYDROMORPHONE HYDROCHLORIDE 0.25 MILLIGRAM(S): 2 INJECTION INTRAMUSCULAR; INTRAVENOUS; SUBCUTANEOUS at 13:52

## 2023-01-29 RX ADMIN — Medication 975 MILLIGRAM(S): at 20:39

## 2023-01-29 RX ADMIN — Medication 100 MILLIGRAM(S): at 00:35

## 2023-01-29 RX ADMIN — ATORVASTATIN CALCIUM 80 MILLIGRAM(S): 80 TABLET, FILM COATED ORAL at 21:27

## 2023-01-29 RX ADMIN — GABAPENTIN 300 MILLIGRAM(S): 400 CAPSULE ORAL at 05:00

## 2023-01-29 RX ADMIN — OXYCODONE HYDROCHLORIDE 10 MILLIGRAM(S): 5 TABLET ORAL at 12:17

## 2023-01-29 RX ADMIN — Medication 975 MILLIGRAM(S): at 17:24

## 2023-01-29 RX ADMIN — Medication 975 MILLIGRAM(S): at 00:00

## 2023-01-29 RX ADMIN — OXYCODONE HYDROCHLORIDE 10 MILLIGRAM(S): 5 TABLET ORAL at 21:30

## 2023-01-29 RX ADMIN — Medication 10 MILLIGRAM(S): at 00:35

## 2023-01-29 RX ADMIN — Medication 975 MILLIGRAM(S): at 11:19

## 2023-01-29 RX ADMIN — POLYETHYLENE GLYCOL 3350 17 GRAM(S): 17 POWDER, FOR SOLUTION ORAL at 05:00

## 2023-01-29 RX ADMIN — Medication 81 MILLIGRAM(S): at 11:21

## 2023-01-29 RX ADMIN — Medication 125 MILLILITER(S): at 05:51

## 2023-01-29 RX ADMIN — Medication 975 MILLIGRAM(S): at 05:30

## 2023-01-29 RX ADMIN — HYDROMORPHONE HYDROCHLORIDE 0.25 MILLIGRAM(S): 2 INJECTION INTRAMUSCULAR; INTRAVENOUS; SUBCUTANEOUS at 14:05

## 2023-01-29 RX ADMIN — OXYCODONE HYDROCHLORIDE 10 MILLIGRAM(S): 5 TABLET ORAL at 22:15

## 2023-01-29 RX ADMIN — PANTOPRAZOLE SODIUM 40 MILLIGRAM(S): 20 TABLET, DELAYED RELEASE ORAL at 11:19

## 2023-01-29 RX ADMIN — Medication 250 MILLIGRAM(S): at 09:23

## 2023-01-29 RX ADMIN — GABAPENTIN 300 MILLIGRAM(S): 400 CAPSULE ORAL at 21:27

## 2023-01-29 RX ADMIN — GABAPENTIN 300 MILLIGRAM(S): 400 CAPSULE ORAL at 13:47

## 2023-01-29 RX ADMIN — CHLORHEXIDINE GLUCONATE 5 MILLILITER(S): 213 SOLUTION TOPICAL at 17:25

## 2023-01-29 RX ADMIN — POLYETHYLENE GLYCOL 3350 17 GRAM(S): 17 POWDER, FOR SOLUTION ORAL at 17:25

## 2023-01-29 RX ADMIN — Medication 975 MILLIGRAM(S): at 06:15

## 2023-01-29 RX ADMIN — CHLORHEXIDINE GLUCONATE 1 APPLICATION(S): 213 SOLUTION TOPICAL at 11:19

## 2023-01-29 RX ADMIN — CHLORHEXIDINE GLUCONATE 5 MILLILITER(S): 213 SOLUTION TOPICAL at 05:00

## 2023-01-29 RX ADMIN — Medication 25 MILLIGRAM(S): at 17:25

## 2023-01-29 RX ADMIN — OXYCODONE HYDROCHLORIDE 10 MILLIGRAM(S): 5 TABLET ORAL at 13:15

## 2023-01-29 RX ADMIN — ENOXAPARIN SODIUM 40 MILLIGRAM(S): 100 INJECTION SUBCUTANEOUS at 05:00

## 2023-01-29 NOTE — DIETITIAN INITIAL EVALUATION ADULT - PERTINENT MEDS FT
MEDICATIONS  (STANDING):  acetaminophen     Tablet .. 975 milliGRAM(s) Oral every 6 hours  aspirin enteric coated 81 milliGRAM(s) Oral daily  atorvastatin 80 milliGRAM(s) Oral at bedtime  chlorhexidine 0.12% Liquid 5 milliLiter(s) Oral Mucosa two times a day  chlorhexidine 2% Cloths 1 Application(s) Topical daily  dextrose 5%. 1000 milliLiter(s) (50 mL/Hr) IV Continuous <Continuous>  dextrose 5%. 1000 milliLiter(s) (100 mL/Hr) IV Continuous <Continuous>  dextrose 50% Injectable 50 milliLiter(s) IV Push every 15 minutes  dextrose 50% Injectable 25 milliLiter(s) IV Push every 15 minutes  enoxaparin Injectable 40 milliGRAM(s) SubCutaneous every 24 hours  gabapentin 300 milliGRAM(s) Oral three times a day  glucagon  Injectable 1 milliGRAM(s) IntraMuscular once  influenza  Vaccine (HIGH DOSE) 0.7 milliLiter(s) IntraMuscular once  insulin lispro (ADMELOG) corrective regimen sliding scale   SubCutaneous three times a day before meals  metoprolol tartrate 25 milliGRAM(s) Oral two times a day  pantoprazole    Tablet 40 milliGRAM(s) Oral daily  polyethylene glycol 3350 17 Gram(s) Oral two times a day  potassium chloride  10 mEq/50 mL IVPB 10 milliEquivalent(s) IV Intermittent every 1 hour  potassium chloride  10 mEq/50 mL IVPB 10 milliEquivalent(s) IV Intermittent every 1 hour  potassium chloride  10 mEq/50 mL IVPB 10 milliEquivalent(s) IV Intermittent every 1 hour  senna 2 Tablet(s) Oral at bedtime  sodium chloride 0.9%. 1000 milliLiter(s) (10 mL/Hr) IV Continuous <Continuous>  sodium chloride 0.9%. 1000 milliLiter(s) (5 mL/Hr) IV Continuous <Continuous>    MEDICATIONS  (PRN):  dextrose Oral Gel 15 Gram(s) Oral once PRN Blood Glucose LESS THAN 70 milliGRAM(s)/deciliter  HYDROmorphone  Injectable 0.25 milliGRAM(s) IV Push every 3 hours PRN Moderate Pain (4 - 6)  ondansetron Injectable 4 milliGRAM(s) IV Push every 6 hours PRN Nausea and/or Vomiting  oxyCODONE    IR 5 milliGRAM(s) Oral every 4 hours PRN Moderate Pain (4 - 6)  oxyCODONE    IR 10 milliGRAM(s) Oral every 4 hours PRN Severe Pain (7 - 10)

## 2023-01-29 NOTE — DIETITIAN INITIAL EVALUATION ADULT - OTHER INFO
67 year old male with a pmhx of CAD pci x1 in 2010, HTN, HLD, gout, family hx of early CAD Father MI at 50. Presented to his Cardiologist with dyspnea on exertion since Nov 2022.  He is s/p stress test that showed a mild defect in two areas (inferior and anterolateral). Cardiac catheterization revealed severe 4-vessel coronary artery disease with an elevated LVEDP and normal systolic left ventricular function. Now S/P CABG x4 on 1/28/2023.

## 2023-01-29 NOTE — DIETITIAN INITIAL EVALUATION ADULT - ORAL INTAKE PTA/DIET HISTORY
Pt reported good po intake. Pt now with fair appetite, consuming ~50% of meals. Pt denied recent wt changes,  lbs. Provided written and verbal nutrition education.  Pt reported good po intake PTA. Pt now with fair appetite, consuming ~50% of meals. Pt denied recent wt changes,  lbs. Provided written and verbal nutrition education.

## 2023-01-29 NOTE — DIETITIAN INITIAL EVALUATION ADULT - NSICDXPASTSURGICALHX_GEN_ALL_CORE_FT
PAST SURGICAL HISTORY:  History of percutaneous coronary intervention     S/P arthroscopy of shoulder     Status post phlebectomy

## 2023-01-29 NOTE — DIETITIAN INITIAL EVALUATION ADULT - NSICDXPASTMEDICALHX_GEN_ALL_CORE_FT
PAST MEDICAL HISTORY:  CAD (coronary artery disease)     Gout     Hyperlipidemia     Hypertension

## 2023-01-29 NOTE — DIETITIAN INITIAL EVALUATION ADULT - NSFNSGIIOFT_GEN_A_CORE
01-28-23 @ 07:01  -  01-29-23 @ 07:00  --------------------------------------------------------  OUT:    Chest Tube (mL): 200 mL    Chest Tube (mL): 70 mL  Total OUT: 270 mL    Total NET: -270 mL

## 2023-01-29 NOTE — DIETITIAN INITIAL EVALUATION ADULT - PERTINENT LABORATORY DATA
01-29    139  |  106  |  21.1<H>  ----------------------------<  150<H>  4.8   |  26.0  |  0.91    Ca    8.6      29 Jan 2023 02:15  Mg     2.3     01-29    TPro  5.8<L>  /  Alb  4.4  /  TBili  0.6  /  DBili  0.2  /  AST  35  /  ALT  17  /  AlkPhos  30<L>  01-28  POCT Blood Glucose.: 132 mg/dL (01-29-23 @ 11:24)  A1C with Estimated Average Glucose Result: 5.2 % (01-19-23 @ 10:20)

## 2023-01-29 NOTE — DIETITIAN INITIAL EVALUATION ADULT - NS FNS DIET ORDER
Diet, Regular:   Consistent Carbohydrate {No Snacks} (CSTCHO)  DASH/TLC {Sodium & Cholesterol Restricted} (DASH) (01-28-23 @ 10:12)

## 2023-01-30 ENCOUNTER — TRANSCRIPTION ENCOUNTER (OUTPATIENT)
Age: 68
End: 2023-01-30

## 2023-01-30 LAB
ANION GAP SERPL CALC-SCNC: 6 MMOL/L — SIGNIFICANT CHANGE UP (ref 5–17)
BUN SERPL-MCNC: 23.6 MG/DL — HIGH (ref 8–20)
CALCIUM SERPL-MCNC: 8.4 MG/DL — SIGNIFICANT CHANGE UP (ref 8.4–10.5)
CHLORIDE SERPL-SCNC: 106 MMOL/L — SIGNIFICANT CHANGE UP (ref 96–108)
CO2 SERPL-SCNC: 28 MMOL/L — SIGNIFICANT CHANGE UP (ref 22–29)
CREAT SERPL-MCNC: 0.87 MG/DL — SIGNIFICANT CHANGE UP (ref 0.5–1.3)
EGFR: 95 ML/MIN/1.73M2 — SIGNIFICANT CHANGE UP
GLUCOSE SERPL-MCNC: 101 MG/DL — HIGH (ref 70–99)
HCT VFR BLD CALC: 28.7 % — LOW (ref 39–50)
HGB BLD-MCNC: 9.7 G/DL — LOW (ref 13–17)
MAGNESIUM SERPL-MCNC: 2.2 MG/DL — SIGNIFICANT CHANGE UP (ref 1.6–2.6)
MCHC RBC-ENTMCNC: 30.6 PG — SIGNIFICANT CHANGE UP (ref 27–34)
MCHC RBC-ENTMCNC: 33.8 GM/DL — SIGNIFICANT CHANGE UP (ref 32–36)
MCV RBC AUTO: 90.5 FL — SIGNIFICANT CHANGE UP (ref 80–100)
PLATELET # BLD AUTO: 86 K/UL — LOW (ref 150–400)
POTASSIUM SERPL-MCNC: 4.2 MMOL/L — SIGNIFICANT CHANGE UP (ref 3.5–5.3)
POTASSIUM SERPL-SCNC: 4.2 MMOL/L — SIGNIFICANT CHANGE UP (ref 3.5–5.3)
RBC # BLD: 3.17 M/UL — LOW (ref 4.2–5.8)
RBC # FLD: 12.4 % — SIGNIFICANT CHANGE UP (ref 10.3–14.5)
SODIUM SERPL-SCNC: 139 MMOL/L — SIGNIFICANT CHANGE UP (ref 135–145)
WBC # BLD: 9.12 K/UL — SIGNIFICANT CHANGE UP (ref 3.8–10.5)
WBC # FLD AUTO: 9.12 K/UL — SIGNIFICANT CHANGE UP (ref 3.8–10.5)

## 2023-01-30 PROCEDURE — 99233 SBSQ HOSP IP/OBS HIGH 50: CPT

## 2023-01-30 PROCEDURE — 99024 POSTOP FOLLOW-UP VISIT: CPT

## 2023-01-30 PROCEDURE — 71045 X-RAY EXAM CHEST 1 VIEW: CPT | Mod: 26

## 2023-01-30 RX ORDER — FUROSEMIDE 40 MG
40 TABLET ORAL DAILY
Refills: 0 | Status: DISCONTINUED | OUTPATIENT
Start: 2023-01-30 | End: 2023-01-31

## 2023-01-30 RX ORDER — ACETAMINOPHEN 500 MG
650 TABLET ORAL EVERY 6 HOURS
Refills: 0 | Status: DISCONTINUED | OUTPATIENT
Start: 2023-01-30 | End: 2023-02-01

## 2023-01-30 RX ADMIN — SODIUM CHLORIDE 3 MILLILITER(S): 9 INJECTION INTRAMUSCULAR; INTRAVENOUS; SUBCUTANEOUS at 12:27

## 2023-01-30 RX ADMIN — GABAPENTIN 300 MILLIGRAM(S): 400 CAPSULE ORAL at 13:08

## 2023-01-30 RX ADMIN — CHLORHEXIDINE GLUCONATE 1 APPLICATION(S): 213 SOLUTION TOPICAL at 11:02

## 2023-01-30 RX ADMIN — Medication 40 MILLIGRAM(S): at 09:19

## 2023-01-30 RX ADMIN — Medication 25 MILLIGRAM(S): at 17:29

## 2023-01-30 RX ADMIN — GABAPENTIN 300 MILLIGRAM(S): 400 CAPSULE ORAL at 21:06

## 2023-01-30 RX ADMIN — POLYETHYLENE GLYCOL 3350 17 GRAM(S): 17 POWDER, FOR SOLUTION ORAL at 05:07

## 2023-01-30 RX ADMIN — Medication 25 MILLIGRAM(S): at 05:08

## 2023-01-30 RX ADMIN — PANTOPRAZOLE SODIUM 40 MILLIGRAM(S): 20 TABLET, DELAYED RELEASE ORAL at 11:02

## 2023-01-30 RX ADMIN — SODIUM CHLORIDE 3 MILLILITER(S): 9 INJECTION INTRAMUSCULAR; INTRAVENOUS; SUBCUTANEOUS at 21:08

## 2023-01-30 RX ADMIN — OXYCODONE HYDROCHLORIDE 10 MILLIGRAM(S): 5 TABLET ORAL at 13:05

## 2023-01-30 RX ADMIN — SODIUM CHLORIDE 3 MILLILITER(S): 9 INJECTION INTRAMUSCULAR; INTRAVENOUS; SUBCUTANEOUS at 05:21

## 2023-01-30 RX ADMIN — Medication 81 MILLIGRAM(S): at 11:01

## 2023-01-30 RX ADMIN — ATORVASTATIN CALCIUM 80 MILLIGRAM(S): 80 TABLET, FILM COATED ORAL at 21:06

## 2023-01-30 RX ADMIN — GABAPENTIN 300 MILLIGRAM(S): 400 CAPSULE ORAL at 05:08

## 2023-01-30 RX ADMIN — ENOXAPARIN SODIUM 40 MILLIGRAM(S): 100 INJECTION SUBCUTANEOUS at 05:07

## 2023-01-30 RX ADMIN — OXYCODONE HYDROCHLORIDE 10 MILLIGRAM(S): 5 TABLET ORAL at 12:07

## 2023-01-30 RX ADMIN — Medication 650 MILLIGRAM(S): at 07:00

## 2023-01-30 RX ADMIN — Medication 650 MILLIGRAM(S): at 06:00

## 2023-01-30 RX ADMIN — SENNA PLUS 2 TABLET(S): 8.6 TABLET ORAL at 21:06

## 2023-01-30 NOTE — DISCHARGE NOTE PROVIDER - CARE PROVIDERS DIRECT ADDRESSES
[email or web address]

## 2023-01-30 NOTE — DISCHARGE NOTE PROVIDER - NSDCFUADDAPPT_GEN_ALL_CORE_FT
Please follow up with Dr. Booker on _____________.  The cardiac surgery office is located on the first floor of [facility] at [address] [place], [place]. Please enter through the lobby. A [facility] employee will then direct you where to go.   --  Your Care Navigator Nurse Practitioner will be in touch to see you in your home within a few days from discharge. The Follow Your Heart program can help ensure you understand your medications, discharge instructions and answer any questions you may have at that time. They are also a great source to address concerns during the day and may be reached at [phone].  Please follow up with Dr. Booker on 2/15/23 at 12:30PM  The cardiac surgery office is located on the first floor of [facility] at [address] [place], [place]. Please enter through the lobby. A [facility] employee will then direct you where to go.   --  Your Care Navigator Nurse Practitioner will be in touch to see you in your home within a few days from discharge. The Follow Your Heart program can help ensure you understand your medications, discharge instructions and answer any questions you may have at that time. They are also a great source to address concerns during the day and may be reached at [phone].

## 2023-01-30 NOTE — DISCHARGE NOTE PROVIDER - HOSPITAL COURSE
67M PMH of CAD PCI x1 in 2010, HTN, HLD, gout, family hx of early CAD Father MI at 50. He initially presented to his Cardiologist with dyspnea on exertion since November 2022.  He then underwent stress test that showed a mild defect in two areas (inferior and anterolateral). Subsequent cardiac catheterization revealed severe 4-vessel coronary artery disease with an elevated LVEDP and normal systolic left ventricular function. Ultimately, he underwent four vessel CABG with Dr. Booker on 1/27/23.      Post op course overall uneventful.  Per Dr. Booker, patient is stable for discharge with follow up.

## 2023-01-30 NOTE — DISCHARGE NOTE PROVIDER - NSDCFUSCHEDAPPT_GEN_ALL_CORE_FT
Ravinder Booker  [facility]  CTSURG [address]  Scheduled Appointment: 02/15/2023

## 2023-01-30 NOTE — DISCHARGE NOTE PROVIDER - CARE PROVIDER_API CALL
Ravinder Booker)  Surgery; Thoracic and Cardiac Surgery  [address] [place] [address]  Phone: [phone]  Fax: [phone]  Established Patient  Follow Up Time: 2 weeks    Tray Segundo  CARDIOVASCULAR DISEASE  [address] [place] [address]  Phone: [phone]  Fax: [phone]  Established Patient  Follow Up Time: 2 weeks

## 2023-01-30 NOTE — DISCHARGE NOTE PROVIDER - NSDCCPCAREPLAN_GEN_ALL_CORE_FT
PRINCIPAL DISCHARGE DIAGNOSIS  Diagnosis: Coronary artery disease  Assessment and Plan of Treatment: You had a coronary artery bypass surgery. Please follow the instructions provided in your discharge booklet. Please follow up with Dr. Booker in the CT Surgery office.      SECONDARY DISCHARGE DIAGNOSES  Diagnosis: Hypertension  Assessment and Plan of Treatment: Continue medications as prescribed.  Please follow up with your Cardiologist and Primary Care Physician 1-2 weeks from discharge.    Diagnosis: Hyperlipidemia  Assessment and Plan of Treatment: Continue medications as prescribed.  Please follow up with your Cardiologist and Primary Care Physician 1-2 weeks from discharge.

## 2023-01-30 NOTE — DISCHARGE NOTE PROVIDER - NSDCFUADDINST_GEN_ALL_CORE_FT
Please call the Cardiothoracic Surgery office at [phone] if you are experiencing any shortness of breath, chest pain, fevers or chills, drainage from the incisions, persistent nausea, vomiting or if you have any questions about your medications. If the symptoms are severe, call 911 and go to the nearest hospital.

## 2023-01-30 NOTE — DISCHARGE NOTE PROVIDER - NSDCMRMEDTOKEN_GEN_ALL_CORE_FT
aspirin 81 mg oral tablet: orally once a day  metoprolol succinate 25 mg oral capsule, extended release: 1 cap(s) orally once a day  rosuvastatin 20 mg oral tablet: 1 tab(s) orally once a day   acetaminophen 325 mg oral tablet: 2 tab(s) orally every 6 hours, As needed, for mild to moderate pain  aspirin 81 mg oral delayed release tablet: 1 tab(s) orally once a day  clopidogrel 75 mg oral tablet: 1 tab(s) orally once a day  metoprolol tartrate 25 mg oral tablet: 1 tab(s) orally 2 times a day  oxyCODONE 5 mg oral tablet: 1 tab(s) orally every 6 hours, As Needed -severe pain MDD:4 tabs  rosuvastatin 20 mg oral tablet: 1 tab(s) orally once a day  senna leaf extract oral tablet: 2 tab(s) orally once a day (at bedtime) as needed for constipation

## 2023-01-31 LAB
ANION GAP SERPL CALC-SCNC: 9 MMOL/L — SIGNIFICANT CHANGE UP (ref 5–17)
BUN SERPL-MCNC: 21.1 MG/DL — HIGH (ref 8–20)
CALCIUM SERPL-MCNC: 8.6 MG/DL — SIGNIFICANT CHANGE UP (ref 8.4–10.5)
CHLORIDE SERPL-SCNC: 101 MMOL/L — SIGNIFICANT CHANGE UP (ref 96–108)
CO2 SERPL-SCNC: 28 MMOL/L — SIGNIFICANT CHANGE UP (ref 22–29)
CREAT SERPL-MCNC: 0.79 MG/DL — SIGNIFICANT CHANGE UP (ref 0.5–1.3)
EGFR: 98 ML/MIN/1.73M2 — SIGNIFICANT CHANGE UP
GLUCOSE SERPL-MCNC: 102 MG/DL — HIGH (ref 70–99)
HCT VFR BLD CALC: 30.9 % — LOW (ref 39–50)
HGB BLD-MCNC: 10.6 G/DL — LOW (ref 13–17)
MAGNESIUM SERPL-MCNC: 2 MG/DL — SIGNIFICANT CHANGE UP (ref 1.6–2.6)
MCHC RBC-ENTMCNC: 30.2 PG — SIGNIFICANT CHANGE UP (ref 27–34)
MCHC RBC-ENTMCNC: 34.3 GM/DL — SIGNIFICANT CHANGE UP (ref 32–36)
MCV RBC AUTO: 88 FL — SIGNIFICANT CHANGE UP (ref 80–100)
PLATELET # BLD AUTO: 115 K/UL — LOW (ref 150–400)
POTASSIUM SERPL-MCNC: 4.1 MMOL/L — SIGNIFICANT CHANGE UP (ref 3.5–5.3)
POTASSIUM SERPL-SCNC: 4.1 MMOL/L — SIGNIFICANT CHANGE UP (ref 3.5–5.3)
RBC # BLD: 3.51 M/UL — LOW (ref 4.2–5.8)
RBC # FLD: 12.2 % — SIGNIFICANT CHANGE UP (ref 10.3–14.5)
SODIUM SERPL-SCNC: 138 MMOL/L — SIGNIFICANT CHANGE UP (ref 135–145)
WBC # BLD: 7.41 K/UL — SIGNIFICANT CHANGE UP (ref 3.8–10.5)
WBC # FLD AUTO: 7.41 K/UL — SIGNIFICANT CHANGE UP (ref 3.8–10.5)

## 2023-01-31 PROCEDURE — 99291 CRITICAL CARE FIRST HOUR: CPT | Mod: 25

## 2023-01-31 PROCEDURE — 99024 POSTOP FOLLOW-UP VISIT: CPT

## 2023-01-31 PROCEDURE — 71045 X-RAY EXAM CHEST 1 VIEW: CPT | Mod: 26

## 2023-01-31 RX ORDER — CLOPIDOGREL BISULFATE 75 MG/1
75 TABLET, FILM COATED ORAL DAILY
Refills: 0 | Status: DISCONTINUED | OUTPATIENT
Start: 2023-01-31 | End: 2023-02-01

## 2023-01-31 RX ADMIN — GABAPENTIN 300 MILLIGRAM(S): 400 CAPSULE ORAL at 10:59

## 2023-01-31 RX ADMIN — Medication 40 MILLIGRAM(S): at 05:29

## 2023-01-31 RX ADMIN — ATORVASTATIN CALCIUM 80 MILLIGRAM(S): 80 TABLET, FILM COATED ORAL at 21:46

## 2023-01-31 RX ADMIN — SODIUM CHLORIDE 3 MILLILITER(S): 9 INJECTION INTRAMUSCULAR; INTRAVENOUS; SUBCUTANEOUS at 22:04

## 2023-01-31 RX ADMIN — GABAPENTIN 300 MILLIGRAM(S): 400 CAPSULE ORAL at 21:46

## 2023-01-31 RX ADMIN — Medication 81 MILLIGRAM(S): at 10:59

## 2023-01-31 RX ADMIN — Medication 25 MILLIGRAM(S): at 18:01

## 2023-01-31 RX ADMIN — Medication 25 MILLIGRAM(S): at 05:29

## 2023-01-31 RX ADMIN — SODIUM CHLORIDE 3 MILLILITER(S): 9 INJECTION INTRAMUSCULAR; INTRAVENOUS; SUBCUTANEOUS at 05:01

## 2023-01-31 RX ADMIN — SODIUM CHLORIDE 3 MILLILITER(S): 9 INJECTION INTRAMUSCULAR; INTRAVENOUS; SUBCUTANEOUS at 11:00

## 2023-01-31 RX ADMIN — GABAPENTIN 300 MILLIGRAM(S): 400 CAPSULE ORAL at 05:29

## 2023-01-31 RX ADMIN — CHLORHEXIDINE GLUCONATE 1 APPLICATION(S): 213 SOLUTION TOPICAL at 12:32

## 2023-01-31 RX ADMIN — PANTOPRAZOLE SODIUM 40 MILLIGRAM(S): 20 TABLET, DELAYED RELEASE ORAL at 10:59

## 2023-01-31 RX ADMIN — CLOPIDOGREL BISULFATE 75 MILLIGRAM(S): 75 TABLET, FILM COATED ORAL at 10:59

## 2023-01-31 RX ADMIN — POLYETHYLENE GLYCOL 3350 17 GRAM(S): 17 POWDER, FOR SOLUTION ORAL at 05:29

## 2023-02-01 ENCOUNTER — TRANSCRIPTION ENCOUNTER (OUTPATIENT)
Age: 68
End: 2023-02-01

## 2023-02-01 VITALS
OXYGEN SATURATION: 98 % | RESPIRATION RATE: 16 BRPM | DIASTOLIC BLOOD PRESSURE: 76 MMHG | HEART RATE: 77 BPM | TEMPERATURE: 98 F | SYSTOLIC BLOOD PRESSURE: 121 MMHG

## 2023-02-01 PROBLEM — I10 ESSENTIAL (PRIMARY) HYPERTENSION: Chronic | Status: ACTIVE | Noted: 2023-01-19

## 2023-02-01 PROBLEM — E78.5 HYPERLIPIDEMIA, UNSPECIFIED: Chronic | Status: ACTIVE | Noted: 2023-01-19

## 2023-02-01 PROBLEM — I25.10 ATHEROSCLEROTIC HEART DISEASE OF NATIVE CORONARY ARTERY WITHOUT ANGINA PECTORIS: Chronic | Status: ACTIVE | Noted: 2023-01-19

## 2023-02-01 PROBLEM — M10.9 GOUT, UNSPECIFIED: Chronic | Status: ACTIVE | Noted: 2023-01-19

## 2023-02-01 LAB
ALBUMIN SERPL ELPH-MCNC: 3.6 G/DL — SIGNIFICANT CHANGE UP (ref 3.3–5.2)
ALP SERPL-CCNC: 39 U/L — LOW (ref 40–120)
ALT FLD-CCNC: 24 U/L — SIGNIFICANT CHANGE UP
ANION GAP SERPL CALC-SCNC: 11 MMOL/L — SIGNIFICANT CHANGE UP (ref 5–17)
AST SERPL-CCNC: 21 U/L — SIGNIFICANT CHANGE UP
BASOPHILS # BLD AUTO: 0.02 K/UL — SIGNIFICANT CHANGE UP (ref 0–0.2)
BASOPHILS NFR BLD AUTO: 0.3 % — SIGNIFICANT CHANGE UP (ref 0–2)
BILIRUB SERPL-MCNC: 0.9 MG/DL — SIGNIFICANT CHANGE UP (ref 0.4–2)
BUN SERPL-MCNC: 20.4 MG/DL — HIGH (ref 8–20)
CALCIUM SERPL-MCNC: 8.7 MG/DL — SIGNIFICANT CHANGE UP (ref 8.4–10.5)
CHLORIDE SERPL-SCNC: 103 MMOL/L — SIGNIFICANT CHANGE UP (ref 96–108)
CO2 SERPL-SCNC: 24 MMOL/L — SIGNIFICANT CHANGE UP (ref 22–29)
CREAT SERPL-MCNC: 0.77 MG/DL — SIGNIFICANT CHANGE UP (ref 0.5–1.3)
EGFR: 99 ML/MIN/1.73M2 — SIGNIFICANT CHANGE UP
EOSINOPHIL # BLD AUTO: 0.3 K/UL — SIGNIFICANT CHANGE UP (ref 0–0.5)
EOSINOPHIL NFR BLD AUTO: 4 % — SIGNIFICANT CHANGE UP (ref 0–6)
GLUCOSE SERPL-MCNC: 98 MG/DL — SIGNIFICANT CHANGE UP (ref 70–99)
HCT VFR BLD CALC: 31.8 % — LOW (ref 39–50)
HGB BLD-MCNC: 10.8 G/DL — LOW (ref 13–17)
IMM GRANULOCYTES NFR BLD AUTO: 0.4 % — SIGNIFICANT CHANGE UP (ref 0–0.9)
LYMPHOCYTES # BLD AUTO: 1.37 K/UL — SIGNIFICANT CHANGE UP (ref 1–3.3)
LYMPHOCYTES # BLD AUTO: 18.1 % — SIGNIFICANT CHANGE UP (ref 13–44)
MAGNESIUM SERPL-MCNC: 2.1 MG/DL — SIGNIFICANT CHANGE UP (ref 1.6–2.6)
MCHC RBC-ENTMCNC: 29.8 PG — SIGNIFICANT CHANGE UP (ref 27–34)
MCHC RBC-ENTMCNC: 34 GM/DL — SIGNIFICANT CHANGE UP (ref 32–36)
MCV RBC AUTO: 87.8 FL — SIGNIFICANT CHANGE UP (ref 80–100)
MONOCYTES # BLD AUTO: 0.67 K/UL — SIGNIFICANT CHANGE UP (ref 0–0.9)
MONOCYTES NFR BLD AUTO: 8.8 % — SIGNIFICANT CHANGE UP (ref 2–14)
NEUTROPHILS # BLD AUTO: 5.19 K/UL — SIGNIFICANT CHANGE UP (ref 1.8–7.4)
NEUTROPHILS NFR BLD AUTO: 68.4 % — SIGNIFICANT CHANGE UP (ref 43–77)
PLATELET # BLD AUTO: 154 K/UL — SIGNIFICANT CHANGE UP (ref 150–400)
POTASSIUM SERPL-MCNC: 3.8 MMOL/L — SIGNIFICANT CHANGE UP (ref 3.5–5.3)
POTASSIUM SERPL-SCNC: 3.8 MMOL/L — SIGNIFICANT CHANGE UP (ref 3.5–5.3)
PROT SERPL-MCNC: 5.7 G/DL — LOW (ref 6.6–8.7)
RBC # BLD: 3.62 M/UL — LOW (ref 4.2–5.8)
RBC # FLD: 12.2 % — SIGNIFICANT CHANGE UP (ref 10.3–14.5)
SODIUM SERPL-SCNC: 138 MMOL/L — SIGNIFICANT CHANGE UP (ref 135–145)
WBC # BLD: 7.58 K/UL — SIGNIFICANT CHANGE UP (ref 3.8–10.5)
WBC # FLD AUTO: 7.58 K/UL — SIGNIFICANT CHANGE UP (ref 3.8–10.5)

## 2023-02-01 PROCEDURE — 82803 BLOOD GASES ANY COMBINATION: CPT

## 2023-02-01 PROCEDURE — 82550 ASSAY OF CK (CPK): CPT

## 2023-02-01 PROCEDURE — 84132 ASSAY OF SERUM POTASSIUM: CPT

## 2023-02-01 PROCEDURE — 82330 ASSAY OF CALCIUM: CPT

## 2023-02-01 PROCEDURE — 97163 PT EVAL HIGH COMPLEX 45 MIN: CPT

## 2023-02-01 PROCEDURE — C1889: CPT

## 2023-02-01 PROCEDURE — 80076 HEPATIC FUNCTION PANEL: CPT

## 2023-02-01 PROCEDURE — 93325 DOPPLER ECHO COLOR FLOW MAPG: CPT

## 2023-02-01 PROCEDURE — 84484 ASSAY OF TROPONIN QUANT: CPT

## 2023-02-01 PROCEDURE — 93312 ECHO TRANSESOPHAGEAL: CPT

## 2023-02-01 PROCEDURE — 85018 HEMOGLOBIN: CPT

## 2023-02-01 PROCEDURE — P9045: CPT

## 2023-02-01 PROCEDURE — 82435 ASSAY OF BLOOD CHLORIDE: CPT

## 2023-02-01 PROCEDURE — C1769: CPT

## 2023-02-01 PROCEDURE — 85014 HEMATOCRIT: CPT

## 2023-02-01 PROCEDURE — 82962 GLUCOSE BLOOD TEST: CPT

## 2023-02-01 PROCEDURE — 93005 ELECTROCARDIOGRAM TRACING: CPT

## 2023-02-01 PROCEDURE — 85025 COMPLETE CBC W/AUTO DIFF WBC: CPT

## 2023-02-01 PROCEDURE — 83605 ASSAY OF LACTIC ACID: CPT

## 2023-02-01 PROCEDURE — 36415 COLL VENOUS BLD VENIPUNCTURE: CPT

## 2023-02-01 PROCEDURE — 85730 THROMBOPLASTIN TIME PARTIAL: CPT

## 2023-02-01 PROCEDURE — 80048 BASIC METABOLIC PNL TOTAL CA: CPT

## 2023-02-01 PROCEDURE — 80053 COMPREHEN METABOLIC PANEL: CPT

## 2023-02-01 PROCEDURE — 85027 COMPLETE CBC AUTOMATED: CPT

## 2023-02-01 PROCEDURE — 85610 PROTHROMBIN TIME: CPT

## 2023-02-01 PROCEDURE — 71045 X-RAY EXAM CHEST 1 VIEW: CPT

## 2023-02-01 PROCEDURE — 83735 ASSAY OF MAGNESIUM: CPT

## 2023-02-01 PROCEDURE — 82553 CREATINE MB FRACTION: CPT

## 2023-02-01 PROCEDURE — 71045 X-RAY EXAM CHEST 1 VIEW: CPT | Mod: 26

## 2023-02-01 PROCEDURE — 94640 AIRWAY INHALATION TREATMENT: CPT

## 2023-02-01 PROCEDURE — 84295 ASSAY OF SERUM SODIUM: CPT

## 2023-02-01 PROCEDURE — 93320 DOPPLER ECHO COMPLETE: CPT

## 2023-02-01 PROCEDURE — 99024 POSTOP FOLLOW-UP VISIT: CPT

## 2023-02-01 PROCEDURE — 94002 VENT MGMT INPAT INIT DAY: CPT

## 2023-02-01 PROCEDURE — 82947 ASSAY GLUCOSE BLOOD QUANT: CPT

## 2023-02-01 PROCEDURE — 97116 GAIT TRAINING THERAPY: CPT

## 2023-02-01 PROCEDURE — 97530 THERAPEUTIC ACTIVITIES: CPT

## 2023-02-01 RX ORDER — CLOPIDOGREL BISULFATE 75 MG/1
1 TABLET, FILM COATED ORAL
Qty: 30 | Refills: 1
Start: 2023-02-01 | End: 2023-04-01

## 2023-02-01 RX ORDER — OXYCODONE HYDROCHLORIDE 5 MG/1
1 TABLET ORAL
Qty: 28 | Refills: 0
Start: 2023-02-01 | End: 2023-02-07

## 2023-02-01 RX ORDER — ASPIRIN/CALCIUM CARB/MAGNESIUM 324 MG
0 TABLET ORAL
Qty: 0 | Refills: 0 | DISCHARGE

## 2023-02-01 RX ORDER — SENNA PLUS 8.6 MG/1
2 TABLET ORAL
Qty: 14 | Refills: 0
Start: 2023-02-01 | End: 2023-02-07

## 2023-02-01 RX ORDER — ACETAMINOPHEN 500 MG
2 TABLET ORAL
Qty: 0 | Refills: 0 | DISCHARGE
Start: 2023-02-01

## 2023-02-01 RX ORDER — ASPIRIN/CALCIUM CARB/MAGNESIUM 324 MG
1 TABLET ORAL
Qty: 30 | Refills: 1
Start: 2023-02-01 | End: 2023-04-01

## 2023-02-01 RX ORDER — METOPROLOL TARTRATE 50 MG
1 TABLET ORAL
Qty: 60 | Refills: 1
Start: 2023-02-01 | End: 2023-04-01

## 2023-02-01 RX ORDER — POTASSIUM CHLORIDE 20 MEQ
40 PACKET (EA) ORAL ONCE
Refills: 0 | Status: COMPLETED | OUTPATIENT
Start: 2023-02-01 | End: 2023-02-01

## 2023-02-01 RX ORDER — METOPROLOL TARTRATE 50 MG
1 TABLET ORAL
Qty: 0 | Refills: 0 | DISCHARGE

## 2023-02-01 RX ADMIN — Medication 81 MILLIGRAM(S): at 08:57

## 2023-02-01 RX ADMIN — Medication 40 MILLIEQUIVALENT(S): at 08:57

## 2023-02-01 RX ADMIN — SODIUM CHLORIDE 3 MILLILITER(S): 9 INJECTION INTRAMUSCULAR; INTRAVENOUS; SUBCUTANEOUS at 13:04

## 2023-02-01 RX ADMIN — Medication 25 MILLIGRAM(S): at 05:19

## 2023-02-01 RX ADMIN — CLOPIDOGREL BISULFATE 75 MILLIGRAM(S): 75 TABLET, FILM COATED ORAL at 08:57

## 2023-02-01 RX ADMIN — GABAPENTIN 300 MILLIGRAM(S): 400 CAPSULE ORAL at 13:03

## 2023-02-01 RX ADMIN — PANTOPRAZOLE SODIUM 40 MILLIGRAM(S): 20 TABLET, DELAYED RELEASE ORAL at 08:57

## 2023-02-01 RX ADMIN — CHLORHEXIDINE GLUCONATE 1 APPLICATION(S): 213 SOLUTION TOPICAL at 08:58

## 2023-02-01 RX ADMIN — GABAPENTIN 300 MILLIGRAM(S): 400 CAPSULE ORAL at 05:19

## 2023-02-01 RX ADMIN — SODIUM CHLORIDE 3 MILLILITER(S): 9 INJECTION INTRAMUSCULAR; INTRAVENOUS; SUBCUTANEOUS at 05:20

## 2023-02-01 NOTE — PROGRESS NOTE ADULT - PROBLEM SELECTOR PLAN 1
Pain control PRN   Beta blocker as tolerated by HR and SBP  Lipitor for chronic graft patency.  Aspirin for acute graft closure  Encourage PO intake  Encourage OOB to chair and ambulation with PT  Encourage deep breathing exercised and coughing  Chest PT and IS use with bedside nurse  Plan to be discussed with attending on AM rounds.
Wean pressor as tolerated  Beta blocker as tolerated by HR and SBP  Lipitor for chronic graft patency.  Aspirin for acute graft closure  Encourage PO intake  Encourage OOB to chair and ambulation with PT  Encourage deep breathing exercised and coughing  Chest PT and IS use with bedside nurse  Maintain chest tubes  Monitor Lactate   Plan to be discussed with attending on AM rounds.
Wean pressor as tolerated  Beta blocker as tolerated by HR and SBP  Lipitor for chronic graft patency.  Aspirin for acute graft closure  Encourage PO intake  Encourage OOB to chair and ambulation with PT  Encourage deep breathing exercised and coughing  Chest PT and IS use with bedside nurse  Maintain chest tubes  Monitor Lactate   Plan to be discussed with attending on AM rounds.
S/p CABG x 4 on 1/28/2023 with Dr. Booker.  Neurologically intact.  Hemodynamically stable.  Continue Lopressor as tolerated by HR and BP.   Continue aspirin and Plavix for acute graft closure prophylaxis.  Continue statin for chronic graft patency prophylaxis.  Oxygenating well, coughing and deep breathing exercises/incentive spirometry encouraged.  No need for diuresis at this time. Monitor strict I/Os. Replenish electrolytes PRN to keep K>4 and Mg>2.   Continue with PT, increase activity as tolerated.  Tylenol, gabapentin, Oxy PRN for analgesia.  Continue bowel regimen PRN constipation.   Case and plan to be reviewed / discussed with CT Surgery attending / team during AM rounds.
Pain control PRN   Beta blocker as tolerated by HR and SBP  Lipitor for chronic graft patency.  Aspirin for acute graft closure. Plavix to be started upon resolution of thrombocytopenia  Encourage PO intake  Encourage OOB to chair and ambulation with PT  Encourage deep breathing exercised and coughing  Chest PT and IS use with bedside nurse  Plan to be discussed with attending on AM rounds.

## 2023-02-01 NOTE — DISCHARGE NOTE NURSING/CASE MANAGEMENT/SOCIAL WORK - PATIENT PORTAL LINK FT
You can access the FollowMyHealth Patient Portal offered by [facility] by registering at the following website: [email or web address]. By joining [facility]’s FollowMyHealth portal, you will also be able to view your health information using other applications (apps) compatible with our system.

## 2023-02-01 NOTE — PROGRESS NOTE ADULT - PROBLEM SELECTOR PLAN 3
Lovenox for DVT prophylaxis  Protonix for GI   Senna and miralax for constipation prophylaxis
SCDs for DVT PPX  Lovenox for DVT prophylaxis on hold 2/2 thrombocytopenia  Protonix for GI   Senna and miralax for constipation prophylaxis
SCDs for DVT prophylaxis.  Lovenox for DVT prophylaxis on hold due to thrombocytopenia.  Protonix for GI prophylaxis.  Senna and Miralax for constipation prophylaxis.    Dispo: Home  Plan to be discussed / reviewed with CT Surgery attending / team during AM rounds.

## 2023-02-01 NOTE — PROGRESS NOTE ADULT - SUBJECTIVE AND OBJECTIVE BOX
ERIK WALLER  MRN#: [id number]  Subjective: The patient was in the CTICU in critical condition at risk for imminent decompensation and was seen and evaluate on AM rounds with the entire multidisciplinary team.     OBJECTIVE:  ICU Vital Signs Last 24 Hrs  T(C): 37.3 (28 Jan 2023 09:00), Max: 37.3 (28 Jan 2023 09:00)  T(F): 99.1 (28 Jan 2023 09:00), Max: 99.1 (28 Jan 2023 09:00)  HR: 82 (28 Jan 2023 09:30) (68 - 94)  BP: 128/75 (28 Jan 2023 08:00) (110/74 - 151/82)  BP(mean): 96 (28 Jan 2023 08:00) (90 - 111)  ABP: 113/63 (28 Jan 2023 09:30) (87/49 - 142/63)  ABP(mean): 84 (28 Jan 2023 09:30) (65 - 124)  RR: 15 (28 Jan 2023 09:30) (7 - 22)  SpO2: 95% (28 Jan 2023 09:30) (92% - 100%)    O2 Parameters below as of 28 Jan 2023 08:00  Patient On (Oxygen Delivery Method): nasal cannula  O2 Flow (L/min): 2          01-27 @ 07:01 - 01-28 @ 07:00  --------------------------------------------------------  IN: 3207.9 mL / OUT: 2245 mL / NET: 962.9 mL    01-28 @ 07:01 - 01-28 @ 10:09  --------------------------------------------------------  IN: 339 mL / OUT: 180 mL / NET: 159 mL        PHYSICAL EXAM:Daily     Daily   General: WN/WD NAD    HEENT:     + NCAT  + EOMI  - Conjuctival edema   - Icterus   - Thrush   - ETT  - NGT/OGT  Neck:         + FROM    + JVD     - Nodes     - Masses    + Mid-line trachea   - Tracheostomy  Chest:         - Sternal click  - Sternal drainage  + Pacing wires  + Chest tubes  - SubQ emphysema  Lungs:          + CTA   - Rhonchi    - Rales    - Wheezing     - Decreased BS   - Dullness R L  Cardiac:       + S1 + S2    + RRR   - Irregular   - S3  - S4    - Murmurs   - Rub   - Hamman’s sign   Abdomen:    + BS     + Soft    + Non-tender     - Distended    - Organomegaly  - PEG  Extremities:   - Cyanosis U/L   - Clubbing  U/L  - LE/UE Edema   + Capillary refill    + Pulses   Neuro:        + Awake   +  Alert   - Confused   - Lethargic   - Sedated   - Generalized Weakness  Skin:        - Rashes    - Erythema   + Normal incisions   + IV sites intact  - Sacral decubitus    HOSPITAL MEDICATIONS: All mediciations reviewed and analyzed  MEDICATIONS  (STANDING):  acetaminophen     Tablet .. 975 milliGRAM(s) Oral every 6 hours  aspirin enteric coated 81 milliGRAM(s) Oral daily  atorvastatin 80 milliGRAM(s) Oral at bedtime  cefuroxime  IVPB 1500 milliGRAM(s) IV Intermittent every 8 hours  enoxaparin Injectable 40 milliGRAM(s) SubCutaneous every 24 hours  gabapentin 300 milliGRAM(s) Oral three times a day  glucagon  Injectable 1 milliGRAM(s) IntraMuscular once  influenza  Vaccine (HIGH DOSE) 0.7 milliLiter(s) IntraMuscular once  insulin regular Infusion 3 Unit(s)/Hr (3 mL/Hr) IV Continuous <Continuous>  metoprolol tartrate 25 milliGRAM(s) Oral two times a day  pantoprazole    Tablet 40 milliGRAM(s) Oral daily  polyethylene glycol 3350 17 Gram(s) Oral two times a day  potassium chloride  10 mEq/50 mL IVPB 10 milliEquivalent(s) IV Intermittent every 1 hour  potassium chloride  10 mEq/50 mL IVPB 10 milliEquivalent(s) IV Intermittent every 1 hour  potassium chloride  10 mEq/50 mL IVPB 10 milliEquivalent(s) IV Intermittent every 1 hour  senna 2 Tablet(s) Oral at bedtime  vancomycin  IVPB 1000 milliGRAM(s) IV Intermittent every 12 hours    MEDICATIONS  (PRN):  dextrose Oral Gel 15 Gram(s) Oral once PRN Blood Glucose LESS THAN 70 milliGRAM(s)/deciliter  HYDROmorphone  Injectable 0.25 milliGRAM(s) IV Push every 3 hours PRN Moderate Pain (4 - 6)  ondansetron Injectable 4 milliGRAM(s) IV Push every 6 hours PRN Nausea and/or Vomiting  oxyCODONE    IR 5 milliGRAM(s) Oral every 4 hours PRN Moderate Pain (4 - 6)  oxyCODONE    IR 10 milliGRAM(s) Oral every 4 hours PRN Severe Pain (7 - 10)    LABS: All Lab data reviewed and analyzed                        11.5   12.40 )-----------( 119      ( 28 Jan 2023 02:00 )             32.1    01-28    140  |  106  |  14.4  ----------------------------<  113<H>  4.0   |  22.0  |  0.88    Ca    8.9      28 Jan 2023 02:00  Mg     1.9     01-28    TPro  5.8<L>  /  Alb  4.4  /  TBili  0.6  /  DBili  0.2  /  AST  35  /  ALT  17  /  AlkPhos  30<L>  01-28    PT/INR - ( 28 Jan 2023 02:00 )   PT: 14.8 sec;   INR: 1.27 ratio         PTT - ( 28 Jan 2023 02:00 )  PTT:25.5 sec   ABG - ( 28 Jan 2023 04:44 )  pH, Arterial: 7.410 pH, Blood: x     /  pCO2: 37    /  pO2: 80    / HCO3: 24    / Base Excess: -1.1  /  SaO2: 97.9      RADIOLOGY: - Reviewed and analyzed     Assessment: Respiratory insufficieny, hypovolemia, hyperglycemia, and resolved hyperlactemia     Plan:   - Respiratory status required supplemental oxygen and the following of continuous pulse oximetry for support & to prevent decompensation  - Continued early mobilization as tolerated  - Patient requires deresuscitation for perioperative fluid administration and increase weight  - Addressed analgesic regimen to optimize function  - ASA continued for graft occlusion-thromboembolism prophylaxis  - Lipitor for long term graft patency  - Lovenox continued for VTE prophylaxis in addition to Venodyne boots  - Protonix maintained for GI bleeding prophylaxis  - Lopressor just started for atrial fibrillation prophylaxis now that Levophed has been stopped and lactate has normalized fro  a high of > 6  - Metabolic stability & infection prophylaxis required review and adjustment of regular Insulin sliding scale and gylcemic regimen while following serial glucose levels to help achieve & maintain euglycemia  - Reviewed & addressed surgical site infection prophylaxis regimen    Patient required critical care management and is at risk for life threatening decompensation  I provided 40 minutes of non-continuous care to the patient.  
ERIK WALLER  MRN-[id number]    HPI:  67 year old male with a pmhx of CAD pci x1 in 2010, HTN, HLD, gout, family hx of early CAD Father MI at 50. Presented to his Cardiologist with dyspnea on exertion since Nov 2022.  He is s/p stress test that showed a mild defect in two areas (inferior and anterolateral). Cardiac catheterization revealed severe 4-vessel coronary artery disease with an elevated LVEDP and normal systolic left ventricular function. Patient denies chest pain, SOB, palpitations, dizziness, near syncope, or syncope. Patient is scheduled for CABG x4 with Dr Booker on 1/27/23.   (19 Jan 2023 09:56)      Surgery/Hospital Course:  CYDNEY , CABG x 4 LIMA to LAD , SVG to OM , SVG to PDA, RLE EVH , no TPW   27 JAN-2023  Dr Booker  Today:  No acute events     ICU Vital Signs Last 24 Hrs  T(C): 36.8 (27 Jan 2023 08:21), Max: 36.8 (27 Jan 2023 08:21)  T(F): 98.3 (27 Jan 2023 08:21), Max: 98.3 (27 Jan 2023 08:21)  HR: 74 (27 Jan 2023 15:05) (57 - 74)  BP: 139/80 (27 Jan 2023 08:21) (139/80 - 139/80)  BP(mean): --  ABP: --  ABP(mean): --  RR: 16 (27 Jan 2023 08:21) (16 - 16)  SpO2: 98% (27 Jan 2023 15:05) (98% - 98%)        Physical Exam:  Gen: sedated  CNS: non focal 	  Neck: no JVD  RES : clear , no wheezing              CVS: Regular  rhythm. Normal S1/S2  Abd: Soft, non-distended. Bowel sounds present.  Skin: No rash.  Ext:  no edema    ============================I/O===========================   I&O's Detail    ============================ LABS =========================              ABG - ( 27 Jan 2023 15:10 )  pH, Arterial: 7.420 pH, Blood: x     /  pCO2: 38    /  pO2: 136   / HCO3: 25    / Base Excess: 0.1   /  SaO2: 99.2                ======================Micro/Rad/Cardio=================  Culture: Reviewed   CXR: Reviewed  Echo:Reviewed  ======================================================  PAST MEDICAL & SURGICAL HISTORY:  Hypertension      Hyperlipidemia      CAD (coronary artery disease)      Gout      S/P arthroscopy of shoulder      History of percutaneous coronary intervention      Status post phlebectomy        ====================ASSESSMENT ==============  67 year old male with a pmhx of CAD pci x1 in 2010, HTN, HLD, gout, family hx of early CAD Father MI at 50. Presented to his Cardiologist with dyspnea on exertion since Nov 2022.  He is s/p stress test that showed a mild defect in two areas (inferior and anterolateral). Cardiac catheterization revealed severe 4-vessel coronary artery disease with an elevated LVEDP and normal systolic left ventricular function. Patient denies chest pain, SOB, palpitations, dizziness, near syncope, or syncope. Patient is scheduled for CABG x4 with Dr Booker on 1/27/23.   (19 Jan 2023 09:56)    Hypertension  Hyperlipidemia  CAD (coronary artery disease)  Gout  S/P arthroscopy of shoulder  History of percutaneous coronary intervention  Status post phlebectomy  S/p CYDNEY , S/p CABG x 4 LIMA to LAD , SVG to OM , SVG to PDA, RLE EVH , no TPW   27 JAN-2023  Dr Booker  Post op Hypovolemia  Post op respiratory insufficiency       Plan:  ====================== NEUROLOGY=====================  ondansetron  IVPB 4 milliGRAM(s) IV Intermittent every 6 hours PRN Nausea and/or Vomiting  sedated  ==================== RESPIRATORY======================  Post op respiratory insufficiency  Mechanical Ventilation:  Mode: AC/ CMV (Assist Control/ Continuous Mandatory Ventilation)  RR (machine): 12  TV (machine): 750  FiO2: 100  PEEP: 5  MAP: 10  PIP: 22      ====================CARDIOVASCULAR==================  Post op Hypovolemia  norepinephrine Infusion 0.05 MICROgram(s)/kG/Min (8.81 mL/Hr) IV Continuous <Continuous>    ===================HEMATOLOGIC/ONC ===================  Monitor H&H/Plts    aspirin  chewable 81 milliGRAM(s) Oral once    ===================== RENAL =========================  Continue monitoring urine output, I&OS, BUN/Cr     ==================== GASTROINTESTINAL===================  pantoprazole  Injectable 40 milliGRAM(s) IV Push once  potassium chloride  10 mEq/50 mL IVPB 10 milliEquivalent(s) IV Intermittent every 1 hour  potassium chloride  10 mEq/50 mL IVPB 10 milliEquivalent(s) IV Intermittent every 1 hour  potassium chloride  10 mEq/50 mL IVPB 10 milliEquivalent(s) IV Intermittent every 1 hour  sodium chloride 0.9%. 1000 milliLiter(s) (10 mL/Hr) IV Continuous <Continuous>  sodium chloride 0.9%. 1000 milliLiter(s) (5 mL/Hr) IV Continuous <Continuous>    =======================    ENDOCRINE  =====================  dextrose 50% Injectable 50 milliLiter(s) IV Push every 15 minutes  dextrose 50% Injectable 25 milliLiter(s) IV Push every 15 minutes  insulin regular Infusion 3 Unit(s)/Hr (3 mL/Hr) IV Continuous <Continuous>    ========================INFECTIOUS DISEASE================  cefuroxime  IVPB 1500 milliGRAM(s) IV Intermittent once  cefuroxime  IVPB 1500 milliGRAM(s) IV Intermittent every 8 hours  vancomycin  IVPB 1000 milliGRAM(s) IV Intermittent every 12 hours      -Close hemodynamic , ventilatory and drain monitoring and management per post op routine .  -Monitor Neurologic status ,   -Head of the bed should remain elevated to 45 degrees,  -Monitor adequacy of oxygenation and ventilation and attempt to wean oxygen ,  -Monitor for arrhythmias and monitor parameters for organ perfusion,  -Glycemic control is satisfactory,  -Nutritional goals will be met using po eventually , insure adequate caloric intake and monitor the same ,  -Electrolytes have been repleted as necessary , pain control has been achieved  and wound care has been carried out ,  -Stress ulcer and VTE prophylaxis will be achieved,  -Agressive PT and early mobility and ambulation goals will be met,  -The family was updated about the course and plan .      I have spent 35 minutes providing acute care for this critically ill patient     Patient requires continuous monitoring with bedside rhythm monitoring, pulse ox monitoring, and intermittent blood gas analysis. Care plan discussed with ICU care team. Patient remained critical and at risk for life threatening decompensation.           
ERIK WALLER  MRN-[id number]    HPI:  67 year old male with a pmhx of CAD pci x1 in 2010, HTN, HLD, gout, family hx of early CAD Father MI at 50. Presented to his Cardiologist with dyspnea on exertion since Nov 2022.  He is s/p stress test that showed a mild defect in two areas (inferior and anterolateral). Cardiac catheterization revealed severe 4-vessel coronary artery disease with an elevated LVEDP and normal systolic left ventricular function. Patient denies chest pain, SOB, palpitations, dizziness, near syncope, or syncope. Patient is scheduled for CABG x4 with Dr Booker on 1/27/23.   (19 Jan 2023 09:56)      Surgery/Hospital Course:  ·  PRE-OP DIAGNOSIS:  Atherosclerosis of coronary artery   ·  POST-OP DIAGNOSIS:  Atherosclerosis of coronary artery  ·  PROCEDURES:  CABG, with CYDNEY 27-Jan-2023   C4L .-  Today:  No acute events   RA , SR   to 120  PLTS count 115  No chest tubes  Lopressor 25 mg bid  DC Lasix    ICU Vital Signs Last 24 Hrs  T(C): 36.7 (31 Jan 2023 05:50), Max: 37.4 (31 Jan 2023 00:00)  T(F): 98.1 (31 Jan 2023 05:50), Max: 99.4 (31 Jan 2023 00:00)  HR: 79 (31 Jan 2023 05:50) (70 - 82)  BP: 127/79 (31 Jan 2023 05:50) (108/70 - 137/80)  BP(mean): 104 (31 Jan 2023 05:30) (83 - 104)  ABP: --  ABP(mean): --  RR: 16 (31 Jan 2023 05:50) (13 - 20)  SpO2: 94% (31 Jan 2023 05:50) (92% - 96%)    O2 Parameters below as of 31 Jan 2023 05:50  Patient On (Oxygen Delivery Method): room air            Physical Exam:  Gen: A&O   CNS: non focal 	  Neck: no JVD  RES : clear , no wheezing              CVS: Regular  rhythm. Normal S1/S2  Abd: Soft, non-distended. Bowel sounds present.  Skin: No rash.  Ext:  no edema    ============================I/O===========================   I&O's Detail    30 Jan 2023 07:01  -  31 Jan 2023 07:00  --------------------------------------------------------  IN:    Oral Fluid: 1080 mL  Total IN: 1080 mL    OUT:    Voided (mL): 2475 mL  Total OUT: 2475 mL    Total NET: -1395 mL        ============================ LABS =========================                        10.6   7.41  )-----------( 115      ( 31 Jan 2023 02:38 )             30.9     01-31    138  |  101  |  21.1<H>  ----------------------------<  102<H>  4.1   |  28.0  |  0.79    Ca    8.6      31 Jan 2023 02:38  Mg     2.0     01-31              ======================Micro/Rad/Cardio=================  Culture: Reviewed   CXR: Reviewed  Echo:Reviewed  ======================================================  PAST MEDICAL & SURGICAL HISTORY:  Hypertension      Hyperlipidemia      CAD (coronary artery disease)      Gout      S/P arthroscopy of shoulder      History of percutaneous coronary intervention      Status post phlebectomy        ====================ASSESSMENT ==============  67 year old male with a pmhx of CAD pci x1 in 2010, HTN, HLD, gout, family hx of early CAD Father MI at 50. Presented to his Cardiologist with dyspnea on exertion since Nov 2022.  He is s/p stress test that showed a mild defect in two areas (inferior and anterolateral). Cardiac catheterization revealed severe 4-vessel coronary artery disease with an elevated LVEDP and normal systolic left ventricular function. Now S/P CABG x4 on 1/28/2023.    Post op course overall uneventful - recovering well     ---Atherosclerosis of coronary artery.   --- Hypertension.   ---Post op Hypovolemia  ---Post op respiratory insufficiency       Plan:  -: Pain control PRN   -Beta blocker as tolerated by HR and SBP  -Lipitor for chronic graft patency.  -Aspirin for acute graft closure. Plavix to be started  today  -Chest PT and IS use with bedside nurse  -SCDs for DVT PPX  -Protonix for GI   -Senna and miralax for constipation prophylaxis.-  ====================== NEUROLOGY=====================  acetaminophen     Tablet .. 650 milliGRAM(s) Oral every 6 hours PRN Temp greater or equal to 38C (100.4F), Mild Pain (1 - 3)  gabapentin 300 milliGRAM(s) Oral three times a day  ondansetron Injectable 4 milliGRAM(s) IV Push every 6 hours PRN Nausea and/or Vomiting  oxyCODONE    IR 5 milliGRAM(s) Oral every 4 hours PRN Moderate Pain (4 - 6)  oxyCODONE    IR 10 milliGRAM(s) Oral every 4 hours PRN Severe Pain (7 - 10)    ==================== RESPIRATORY======================  Post op respiratory insufficiency  RA    ====================CARDIOVASCULAR==================  Post op Hypovolemia  furosemide    Tablet 40 milliGRAM(s) Oral daily  metoprolol tartrate 25 milliGRAM(s) Oral two times a day    ===================HEMATOLOGIC/ONC ===================  Monitor H&H/Plts    aspirin enteric coated 81 milliGRAM(s) Oral daily    ===================== RENAL =========================  Continue monitoring urine output, I&OS, BUN/Cr     ==================== GASTROINTESTINAL===================  pantoprazole    Tablet 40 milliGRAM(s) Oral daily  polyethylene glycol 3350 17 Gram(s) Oral two times a day  senna 2 Tablet(s) Oral at bedtime  sodium chloride 0.9% lock flush 3 milliLiter(s) IV Push every 8 hours    =======================    ENDOCRINE  =====================  atorvastatin 80 milliGRAM(s) Oral at bedtime    ========================INFECTIOUS DISEASE================      -Monitor Neurologic status ,   -Head of the bed should remain elevated to 45 degrees,  -Monitor adequacy of oxygenation and ventilation and attempt to wean oxygen ,  -Monitor for arrhythmias and monitor parameters for organ perfusion,  -Glycemic control is satisfactory,  -Nutritional goals will be met using po eventually , insure adequate caloric intake and monitor the same ,  -Electrolytes have been repleted as necessary , pain control has been achieved  and wound care has been carried out ,  -Stress ulcer and VTE prophylaxis will be achieved,  -Agressive PT and early mobility and ambulation goals will be met,    I have spent 35 minutes providing acute care for this critically ill patient     Patient requires continuous monitoring with bedside rhythm monitoring, pulse ox monitoring, and intermittent blood gas analysis. Care plan discussed with ICU care team. Patient remained critical and at risk for life threatening decompensation.           
Patient seen and examined.  Denies CP, SOB, N/V.    T(C): 37.3 (01-29-23 @ 00:00)  T(F): 99.2 (01-29-23 @ 00:00)  HR: 70 (01-29-23 @ 00:00)  BP: 118/70 (01-29-23 @ 00:00)  BP(mean): 88 (01-29-23 @ 00:00)  ABP: 85/75 (01-28-23 @ 14:00)  ABP(mean): 79 (01-28-23 @ 14:00)  RR: 16 (01-29-23 @ 00:00)  SpO2: 96% (01-29-23 @ 00:00)  Wt(kg): --  CVP(mm Hg): 13 (01-28-23 @ 17:00)  CI: --  PA: --  PA(mean): --  PA(direct): --  SVRI: --      Physical Exam:  Gen: A&Ox3  Pulm:  diminished BS R base no r/r/w  CV:  S1S2, RRR, no m/r/g  Abd: +BS, soft, NT, ND  Ext:  +DP b/l, no c/c/e  Incision:  c/d/i no click, no exudate    I&O's Detail    27 Jan 2023 07:01  -  28 Jan 2023 07:00  --------------------------------------------------------  IN:    Albumin 5%  - 250 mL: 1000 mL    Insulin: 109 mL    IV PiggyBack: 100 mL    IV PiggyBack: 100 mL    IV PiggyBack: 100 mL    IV PiggyBack: 250 mL    IV PiggyBack: 150 mL    IV PiggyBack: 100 mL    Lactated Ringers Bolus: 1000 mL    Norepinephrine: 58.9 mL    sodium chloride 0.9%: 80 mL    sodium chloride 0.9%: 160 mL  Total IN: 3207.9 mL    OUT:    Chest Tube (mL): 180 mL    Chest Tube (mL): 460 mL    Indwelling Catheter - Urethral (mL): 1605 mL  Total OUT: 2245 mL    Total NET: 962.9 mL      28 Jan 2023 07:01  -  29 Jan 2023 01:42  --------------------------------------------------------  IN:    Insulin: 14 mL    IV PiggyBack: 50 mL    IV PiggyBack: 500 mL    IV PiggyBack: 100 mL    sodium chloride 0.9%: 110 mL    sodium chloride 0.9%: 60 mL  Total IN: 834 mL    OUT:    Chest Tube (mL): 160 mL    Chest Tube (mL): 50 mL    Indwelling Catheter - Urethral (mL): 765 mL    Voided (mL): 1000 mL  Total OUT: 1975 mL    Total NET: -1141 mL                              11.5   12.40 )-----------( 119      ( 28 Jan 2023 02:00 )             32.1   01-28    140  |  106  |  14.4  ----------------------------<  113<H>  4.0   |  22.0  |  0.88    Ca    8.9      28 Jan 2023 02:00  Mg     1.9     01-28    TPro  5.8<L>  /  Alb  4.4  /  TBili  0.6  /  DBili  0.2  /  AST  35  /  ALT  17  /  AlkPhos  30<L>  01-28  aPTT: 25.5 sec; PT: 14.8 sec; INR: 1.27 ratio  01-28-23 @ 02:00       ABG - ( 28 Jan 2023 04:44 )  pH: 7.410 /  pCO2: 37    /  pO2: 80    / HCO3: 24    / Base Excess: -1.1  /  SaO2: 97.9 /  Lactate: x        CAPILLARY BLOOD GLUCOSE      POCT Blood Glucose.: 149 mg/dL (28 Jan 2023 16:16)        Medications:  acetaminophen     Tablet .. 975 milliGRAM(s) Oral every 6 hours  aspirin enteric coated 81 milliGRAM(s) Oral daily  atorvastatin 80 milliGRAM(s) Oral at bedtime  cefuroxime  IVPB 1500 milliGRAM(s) IV Intermittent every 8 hours  chlorhexidine 0.12% Liquid 5 milliLiter(s) Oral Mucosa two times a day  chlorhexidine 2% Cloths 1 Application(s) Topical daily  dextrose 5%. 1000 milliLiter(s) IV Continuous <Continuous>  dextrose 5%. 1000 milliLiter(s) IV Continuous <Continuous>  dextrose 50% Injectable 50 milliLiter(s) IV Push every 15 minutes  dextrose 50% Injectable 25 milliLiter(s) IV Push every 15 minutes  dextrose Oral Gel 15 Gram(s) Oral once PRN  enoxaparin Injectable 40 milliGRAM(s) SubCutaneous every 24 hours  gabapentin 300 milliGRAM(s) Oral three times a day  glucagon  Injectable 1 milliGRAM(s) IntraMuscular once  HYDROmorphone  Injectable 0.25 milliGRAM(s) IV Push every 3 hours PRN  influenza  Vaccine (HIGH DOSE) 0.7 milliLiter(s) IntraMuscular once  insulin lispro (ADMELOG) corrective regimen sliding scale   SubCutaneous three times a day before meals  metoprolol tartrate 25 milliGRAM(s) Oral two times a day  ondansetron Injectable 4 milliGRAM(s) IV Push every 6 hours PRN  oxyCODONE    IR 5 milliGRAM(s) Oral every 4 hours PRN  oxyCODONE    IR 10 milliGRAM(s) Oral every 4 hours PRN  pantoprazole    Tablet 40 milliGRAM(s) Oral daily  polyethylene glycol 3350 17 Gram(s) Oral two times a day  potassium chloride  10 mEq/50 mL IVPB 10 milliEquivalent(s) IV Intermittent every 1 hour  potassium chloride  10 mEq/50 mL IVPB 10 milliEquivalent(s) IV Intermittent every 1 hour  potassium chloride  10 mEq/50 mL IVPB 10 milliEquivalent(s) IV Intermittent every 1 hour  senna 2 Tablet(s) Oral at bedtime  sodium chloride 0.9%. 1000 milliLiter(s) IV Continuous <Continuous>  sodium chloride 0.9%. 1000 milliLiter(s) IV Continuous <Continuous>  vancomycin  IVPB 1000 milliGRAM(s) IV Intermittent every 12 hours      CXR: pending      
Patient seen and examined.  Denies CP, SOB, N/V. Increased lactate overnight improved with fluid bolus    T(C): 37.2 (01-28-23 @ 00:00)  T(F): 99 (01-28-23 @ 00:00)  HR: 88 (01-28-23 @ 00:45)  BP: 141/77 (01-28-23 @ 00:00)  BP(mean): 103 (01-28-23 @ 00:00)  ABP: 131/62 (01-28-23 @ 00:45)  ABP(mean): 88 (01-28-23 @ 00:45)  RR: 15 (01-28-23 @ 00:45)  SpO2: 94% (01-28-23 @ 00:45)    CVP(mm Hg): 3 (01-28-23 @ 00:45)    Mode: CPAP with PS, FiO2: 40, PEEP: 5, PS: 5    Physical Exam:  Gen: A&Ox3  Pulm:  CTA b/l, no r/r/w  CV:  S1S2, RRR, no m/r/g  Abd: +BS, soft, NT, ND  Ext:  +DP b/l, no c/c/e  Incision:  c/d/i no click, no exudate    I&O's Detail    27 Jan 2023 07:01  -  28 Jan 2023 00:45  --------------------------------------------------------  IN:    Albumin 5%  - 250 mL: 1000 mL    Insulin: 57 mL    IV PiggyBack: 100 mL    IV PiggyBack: 100 mL    IV PiggyBack: 100 mL    IV PiggyBack: 50 mL    Lactated Ringers Bolus: 1000 mL    Norepinephrine: 52.9 mL    sodium chloride 0.9%: 45 mL    sodium chloride 0.9%: 90 mL  Total IN: 2594.9 mL    OUT:    Chest Tube (mL): 155 mL    Chest Tube (mL): 360 mL    Indwelling Catheter - Urethral (mL): 1045 mL  Total OUT: 1560 mL    Total NET: 1034.9 mL                              13.8   14.84 )-----------( 135      ( 27 Jan 2023 15:10 )             39.0   01-27    141  |  105  |  15.9  ----------------------------<  167<H>  4.4   |  22.0  |  0.85    Ca    9.0      27 Jan 2023 15:10  Mg     2.4     01-27    TPro  5.4<L>  /  Alb  3.8  /  TBili  1.3  /  DBili  0.3  /  AST  28  /  ALT  19  /  AlkPhos  44  01-27  aPTT: 26.3 sec; PT: 14.9 sec; INR: 1.28 ratio  01-27-23 @ 15:10       ABG - ( 27 Jan 2023 23:12 )  pH: 7.380 /  pCO2: 35    /  pO2: 77    / HCO3: 21    / Base Excess: -4.4  /  SaO2: 96.6 /  Lactate: x        CAPILLARY BLOOD GLUCOSE      POCT Blood Glucose.: 167 mg/dL (28 Jan 2023 00:00)        Medications:  acetaminophen     Tablet .. 975 milliGRAM(s) Oral every 6 hours  albuterol/ipratropium for Nebulization 3 milliLiter(s) Nebulizer every 6 hours  aspirin enteric coated 81 milliGRAM(s) Oral daily  atorvastatin 80 milliGRAM(s) Oral at bedtime  cefuroxime  IVPB 1500 milliGRAM(s) IV Intermittent every 8 hours  chlorhexidine 0.12% Liquid 5 milliLiter(s) Oral Mucosa two times a day  chlorhexidine 2% Cloths 1 Application(s) Topical daily  dextrose 5%. 1000 milliLiter(s) IV Continuous <Continuous>  dextrose 5%. 1000 milliLiter(s) IV Continuous <Continuous>  dextrose 50% Injectable 50 milliLiter(s) IV Push every 15 minutes  dextrose 50% Injectable 25 milliLiter(s) IV Push every 15 minutes  dextrose Oral Gel 15 Gram(s) Oral once PRN  glucagon  Injectable 1 milliGRAM(s) IntraMuscular once  insulin lispro Injectable (ADMELOG) 4 Unit(s) SubCutaneous three times a day before meals  insulin regular Infusion 3 Unit(s)/Hr IV Continuous <Continuous>  norepinephrine Infusion 0.05 MICROgram(s)/kG/Min IV Continuous <Continuous>  ondansetron Injectable 4 milliGRAM(s) IV Push every 6 hours PRN  oxyCODONE    IR 5 milliGRAM(s) Oral every 4 hours PRN  oxyCODONE    IR 10 milliGRAM(s) Oral every 4 hours PRN  pantoprazole    Tablet 40 milliGRAM(s) Oral daily  polyethylene glycol 3350 17 Gram(s) Oral two times a day  potassium chloride  10 mEq/50 mL IVPB 10 milliEquivalent(s) IV Intermittent every 1 hour  potassium chloride  10 mEq/50 mL IVPB 10 milliEquivalent(s) IV Intermittent every 1 hour  potassium chloride  10 mEq/50 mL IVPB 10 milliEquivalent(s) IV Intermittent every 1 hour  senna 2 Tablet(s) Oral at bedtime  sodium chloride 0.9%. 1000 milliLiter(s) IV Continuous <Continuous>  sodium chloride 0.9%. 1000 milliLiter(s) IV Continuous <Continuous>  vancomycin  IVPB 1000 milliGRAM(s) IV Intermittent every 12 hours        
  ERIK WALLER  MRN#: [id number]  Subjective: The patient was in the CTICU seen and evaluate on AM rounds with the entire multidisciplinary team.     OBJECTIVE:  ICU Vital Signs Last 24 Hrs  T(C): 36.8 (30 Jan 2023 07:00), Max: 37.2 (29 Jan 2023 20:00)  T(F): 98.3 (30 Jan 2023 07:00), Max: 98.9 (29 Jan 2023 20:00)  HR: 72 (30 Jan 2023 06:03) (65 - 81)  BP: 144/72 (30 Jan 2023 04:00) (103/56 - 144/72)  BP(mean): 102 (30 Jan 2023 04:00) (74 - 102)  ABP: --  ABP(mean): --  RR: 14 (30 Jan 2023 06:03) (12 - 23)  SpO2: 91% (30 Jan 2023 06:03) (91% - 99%)    O2 Parameters below as of 30 Jan 2023 04:00  Patient On (Oxygen Delivery Method): nasal cannula    I&O's Summary    29 Jan 2023 07:01  -  30 Jan 2023 07:00  --------------------------------------------------------  IN: 1320 mL / OUT: 1275 mL / NET: 45 mL    PHYSICAL EXAM:Daily     Daily   General: WN/WD NAD    HEENT:     + NCAT  + EOMI  - Conjuctival edema   - Icterus   - Thrush   - ETT  - NGT/OGT  Neck:         + FROM    + JVD     - Nodes     - Masses    + Mid-line trachea   - Tracheostomy  Chest:         - Sternal click  - Sternal drainage  + Pacing wires  - Chest tubes  - SubQ emphysema  Lungs:          + CTA   - Rhonchi    - Rales    - Wheezing     - Decreased BS   - Dullness R L  Cardiac:       + S1 + S2    + RRR   - Irregular   - S3  - S4    - Murmurs   - Rub   - Hamman’s sign   Abdomen:    + BS     + Soft    + Non-tender     - Distended    - Organomegaly  - PEG  Extremities:   - Cyanosis U/L   - Clubbing  U/L  - LE/UE Edema   + Capillary refill    + Pulses   Neuro:        + Awake   +  Alert   - Confused   - Lethargic   - Sedated   - Generalized Weakness  Skin:        - Rashes    - Erythema   + Normal incisions   + IV sites intact  - Sacral decubitus    HOSPITAL MEDICATIONS: All mediciations reviewed and analyzed  MEDICATIONS  (STANDING):  aspirin enteric coated 81 milliGRAM(s) Oral daily  atorvastatin 80 milliGRAM(s) Oral at bedtime  chlorhexidine 2% Cloths 1 Application(s) Topical daily  furosemide    Tablet 40 milliGRAM(s) Oral daily  gabapentin 300 milliGRAM(s) Oral three times a day  influenza  Vaccine (HIGH DOSE) 0.7 milliLiter(s) IntraMuscular once  metoprolol tartrate 25 milliGRAM(s) Oral two times a day  pantoprazole    Tablet 40 milliGRAM(s) Oral daily  polyethylene glycol 3350 17 Gram(s) Oral two times a day  senna 2 Tablet(s) Oral at bedtime  sodium chloride 0.9% lock flush 3 milliLiter(s) IV Push every 8 hours    MEDICATIONS  (PRN):  acetaminophen     Tablet .. 650 milliGRAM(s) Oral every 6 hours PRN Temp greater or equal to 38C (100.4F), Mild Pain (1 - 3)  ondansetron Injectable 4 milliGRAM(s) IV Push every 6 hours PRN Nausea and/or Vomiting  oxyCODONE    IR 5 milliGRAM(s) Oral every 4 hours PRN Moderate Pain (4 - 6)  oxyCODONE    IR 10 milliGRAM(s) Oral every 4 hours PRN Severe Pain (7 - 10)    LABS: All Lab data reviewed and analyzed                         9.7    9.12  )-----------( 86       ( 30 Jan 2023 02:00 )             28.7   01-30    139  |  106  |  23.6<H>  ----------------------------<  101<H>  4.2   |  28.0  |  0.87    Ca    8.4      30 Jan 2023 02:00  Mg     2.2     01-30    RADIOLOGY: - Reviewed and analyzed     Assessment: Respiratory insufficieny, hypovolemia, hyperglycemia, resolved hyperlactemia, and thrombocytopenia     Plan:   - Respiratory status required supplemental oxygen and the following of continuous pulse oximetry for support & to prevent decompensation  - Continued early mobilization as tolerated  - Patient requires deresuscitation for perioperative fluid administration and increase weight, Lasix once a day  - Addressed analgesic regimen to optimize function  - ASA continued for graft occlusion-thromboembolism prophylaxis, no Plavix secondary to thrombocytopenia, will follow, no HIT assay at this time  - Lipitor for long term graft patency  - Holding Lovenox for VTE prophylaxis, with only Venodyne boots secondary to thrombocytopenia  - Protonix maintained for GI bleeding prophylaxis  - Lopressor for atrial fibrillation prophylaxis   
  POD #5 s/p CABG X 4    PAST MEDICAL & SURGICAL HISTORY:  Hypertension  Hyperlipidemia  CAD (coronary artery disease)  Gout  S/P arthroscopy of shoulder  History of percutaneous coronary intervention  Status post phlebectomy    FAMILY HISTORY:  Family history of early CAD (Father)    Brief Hospital Course: 67 year old male with a pmhx of CAD (PCI x1 in 2010), HTN, HLD, gout, family hx of early CAD (Father MI at age 50), presented to his Cardiologist with dyspnea on exertion since Nov 2022, s/p stress test that showed a mild defect in two areas (inferior and anterolateral). Cardiac catheterization revealed severe 4-vessel coronary artery disease with an elevated LVEDP and normal systolic left ventricular function. Patient s/p CABG x4 on 1/28/2023 with Dr. Booker. Post op course overall uneventful.     Significant recent/past 24 hr events: No overnight events reported.    Subjective: Patient lying in bed in NAD. +Tolerating diet. +Passing BMs since surgery. +Pain currently controlled. Denies fevers, chills, lightheadedness, dizziness, HA, CP, palpitations, SOB, cough, abdominal pain, N/V, diarrhea, numbness/tingling in extremities, or any other acute complaints. ROS negative x 10 systems except as noted above.    MEDICATIONS  (STANDING):  aspirin enteric coated 81 milliGRAM(s) Oral daily  atorvastatin 80 milliGRAM(s) Oral at bedtime  chlorhexidine 2% Cloths 1 Application(s) Topical daily  clopidogrel Tablet 75 milliGRAM(s) Oral daily  gabapentin 300 milliGRAM(s) Oral three times a day  metoprolol tartrate 25 milliGRAM(s) Oral two times a day  pantoprazole    Tablet 40 milliGRAM(s) Oral daily  polyethylene glycol 3350 17 Gram(s) Oral two times a day  senna 2 Tablet(s) Oral at bedtime  sodium chloride 0.9% lock flush 3 milliLiter(s) IV Push every 8 hours    MEDICATIONS  (PRN):  acetaminophen     Tablet .. 650 milliGRAM(s) Oral every 6 hours PRN Temp greater or equal to 38C (100.4F), Mild Pain (1 - 3)  ondansetron Injectable 4 milliGRAM(s) IV Push every 6 hours PRN Nausea and/or Vomiting  oxyCODONE    IR 5 milliGRAM(s) Oral every 4 hours PRN Moderate Pain (4 - 6)  oxyCODONE    IR 10 milliGRAM(s) Oral every 4 hours PRN Severe Pain (7 - 10)    Allergies: No Known Allergies    Vitals   T(C): 37.1 (31 Jan 2023 21:45), Max: 37.2 (31 Jan 2023 05:30)  T(F): 98.7 (31 Jan 2023 21:45), Max: 98.9 (31 Jan 2023 05:30)  HR: 78 (31 Jan 2023 21:45) (71 - 83)  BP: 100/65 (31 Jan 2023 21:45) (100/65 - 137/80)  BP(mean): 104 (31 Jan 2023 05:30) (92 - 104)  RR: 18 (31 Jan 2023 21:45) (13 - 18)  SpO2: 94% (31 Jan 2023 21:45) (93% - 96%)  O2 Parameters below as of 31 Jan 2023 21:45  Patient On (Oxygen Delivery Method): room air    I&O's Detail    30 Jan 2023 07:01  -  31 Jan 2023 07:00  --------------------------------------------------------  IN:    Oral Fluid: 1080 mL  Total IN: 1080 mL    OUT:    Voided (mL): 2475 mL  Total OUT: 2475 mL    Total NET: -1395 mL      31 Jan 2023 07:01  -  01 Feb 2023 00:27  --------------------------------------------------------  IN:    Oral Fluid: 480 mL  Total IN: 480 mL    OUT:  Total OUT: 0 mL    Total NET: 480 mL    Physical Exam  Neuro: A+O x 3, non-focal, speech clear and intact  HEENT:  NCAT, No conjuctival edema or icterus, no thrush.    Neck:  Supple, trachea midline  Pulm: CTA bilaterally, no accessory muscle use noted  CV: regular rate, regular rhythm, +S1S2, no murmur noted  Abd: soft, NT, ND, + BS  Ext: MÉNDEZ x 4, +trace/+1 LE pitting edema R>L, no cyanosis, distal motor/neuro/circ intact  Skin: warm, dry, perfused  Incisions: midsternal mepilex C/D/I, sternum stable, RLE harvest site mepilex C/D/I     LABS                        10.6   7.41  )-----------( 115      ( 31 Jan 2023 02:38 )             30.9     01-31    138  |  101  |  21.1<H>  ----------------------------<  102<H>  4.1   |  28.0  |  0.79    Ca    8.6      31 Jan 2023 02:38  Mg     2.0     01-31      Last CXR:  < from: Xray Chest 1 View- PORTABLE-Routine (Xray Chest 1 View- PORTABLE-Routine in AM.) (01.31.23 @ 06:09) >  INTERPRETATION:  Heart size and the mediastinum cannot be accurately evaluated on this projection. Median sternotomy sutures and surgical clips are again seen. Linear atelectasis seen in the medial right upper lobe, right base, and left mid and lower lung.  No pleural effusion or pneumothorax seen.  No acute bony abnormality.  IMPRESSION:  Bilateral linear atelectasis as above.  < end of copied text >  
SUBJECTIVE   "I had the hiccups bad, they even had to give me a medicine for it"   without acute complaints at this time   currently complaining of mild incisional pain     INTERIM HISTORY SIGNIFICANT FOR   resolution in hiccups   overall uneventful day     Patient is a 67y old  Male who presents with a chief complaint of Atherosclerosis of native coronary artery without angina pectoris     (29 Jan 2023 12:44)    HPI:  67 year old male with a pmhx of CAD pci x1 in 2010, HTN, HLD, gout, family hx of early CAD Father MI at 50. Presented to his Cardiologist with dyspnea on exertion since Nov 2022.  He is s/p stress test that showed a mild defect in two areas (inferior and anterolateral). Cardiac catheterization revealed severe 4-vessel coronary artery disease with an elevated LVEDP and normal systolic left ventricular function. Patient denies chest pain, SOB, palpitations, dizziness, near syncope, or syncope. Patient is scheduled for CABG x4 with Dr Booker on 1/27/23.      (19 Jan 2023 09:56)    OBJECTIVE  PAST MEDICAL & SURGICAL HISTORY:  Hypertension  Hyperlipidemia  CAD (coronary artery disease)  Gout  S/P arthroscopy of shoulder  History of percutaneous coronary intervention  Status post phlebectomy    No Known Allergies    Home Medications:  aspirin 81 mg oral tablet: orally once a day (27 Jan 2023 08:21)  metoprolol succinate 25 mg oral capsule, extended release: 1 cap(s) orally once a day (27 Jan 2023 08:21)  rosuvastatin 20 mg oral tablet: 1 tab(s) orally once a day (27 Jan 2023 08:21)    VITALS  ICU Vital Signs Last 24 Hrs  T(C): 37.2 (30 Jan 2023 00:00), Max: 37.3 (29 Jan 2023 04:00)  T(F): 98.9 (30 Jan 2023 00:00), Max: 99.2 (29 Jan 2023 04:00)  HR: 65 (30 Jan 2023 00:00) (60 - 81)  BP: 106/64 (30 Jan 2023 00:00) (92/61 - 142/66)  BP(mean): 77 (30 Jan 2023 00:00) (69 - 101)  RR: 14 (30 Jan 2023 00:00) (2 - 23)  SpO2: 91% (30 Jan 2023 00:00) (91% - 99%)    O2 Parameters below as of 30 Jan 2023 00:00  Patient On (Oxygen Delivery Method): nasal cannula        LABS                        10.6   13.90 )-----------( 107      ( 29 Jan 2023 02:15 )             31.1   PT/INR - ( 28 Jan 2023 02:00 )   PT: 14.8 sec;   INR: 1.27 ratio         PTT - ( 28 Jan 2023 02:00 )  PTT:25.5 [id number]    139  |  106  |  21.1<H>  ----------------------------<  150<H>  4.8   |  26.0  |  0.91    Ca    8.6      29 Jan 2023 02:15  Mg     2.3     01-29    TPro  5.8<L>  /  Alb  4.4  /  TBili  0.6  /  DBili  0.2  /  AST  35  /  ALT  17  /  AlkPhos  30<L>  01-28  CAPILLARY BLOOD GLUCOSE      POCT Blood Glucose.: 101 mg/dL (29 Jan 2023 16:46)  CARDIAC MARKERS ( 28 Jan 2023 02:00 )  x     / 0.25 ng/mL / 393 U/L / x     / 28.3 ng/mL      ABG - ( 28 Jan 2023 04:44 )  pH, Arterial: 7.410 pH, Blood: x     /  pCO2: 37    /  pO2: 80    / HCO3: 24    / Base Excess: -1.1  /  SaO2: 97.9                IN/OUT    01-28-23 @ 07:01 - 01-29-23 @ 07:00  --------------------------------------------------------  IN: 1084 mL / OUT: 2035 mL / NET: -951 mL    01-29-23 @ 07:01 - 01-30-23 @ 00:45  --------------------------------------------------------  IN: 1320 mL / OUT: 1025 mL / NET: 295 mL      IMAGING  personally reviewed imaging   Xray Chest 1 View- PORTABLE-Routine:   ACC: [id number] EXAM:  XR CHEST PORTABLE ROUTINE 1V   ORDERED BY: JAMES TRIPLETT     PROCEDURE DATE:  01/29/2023          INTERPRETATION:  HISTORY:  Admitting Dxs: I25.10 ATHSCL HEART DISEASE OF   NATIVE; Post-cardiac surgery;  TECHNIQUE: Portable frontal view of the chest, 1 view.  COMPARISON 1/20/2023.  FINDINGS/  IMPRESSION:  The left chest tube is been removed  HEART:difficult to access in this projection  LUNGS: There is atelectasis at the left base.. No pneumothorax  BONES: sternotomy wires      --- End of Report ---            VICK GARLAND MD; Attending Interventional Radiologist  This document has been electronically signed. Jan 29 2023 11:41AM (01-29-23 @ 05:27)    CURRENT MEDICATIONS  MEDICATIONS  (STANDING):  acetaminophen     Tablet .. 975 milliGRAM(s) Oral every 6 hours  aspirin enteric coated 81 milliGRAM(s) Oral daily  atorvastatin 80 milliGRAM(s) Oral at bedtime  chlorhexidine 2% Cloths 1 Application(s) Topical daily  enoxaparin Injectable 40 milliGRAM(s) SubCutaneous every 24 hours  gabapentin 300 milliGRAM(s) Oral three times a day  influenza  Vaccine (HIGH DOSE) 0.7 milliLiter(s) IntraMuscular once  metoprolol tartrate 25 milliGRAM(s) Oral two times a day  pantoprazole    Tablet 40 milliGRAM(s) Oral daily  polyethylene glycol 3350 17 Gram(s) Oral two times a day  senna 2 Tablet(s) Oral at bedtime  sodium chloride 0.9% lock flush 3 milliLiter(s) IV Push every 8 hours    MEDICATIONS  (PRN):  HYDROmorphone  Injectable 0.25 milliGRAM(s) IV Push every 3 hours PRN Moderate Pain (4 - 6)  ondansetron Injectable 4 milliGRAM(s) IV Push every 6 hours PRN Nausea and/or Vomiting  oxyCODONE    IR 5 milliGRAM(s) Oral every 4 hours PRN Moderate Pain (4 - 6)  oxyCODONE    IR 10 milliGRAM(s) Oral every 4 hours PRN Severe Pain (7 - 10)    
Subjective - patient seen and evaluated bedside. Sitting comfortably in bed. Denies CP, SOB, n/v/d. Complains of mild headache, dizziness, now resolved    Review of Systems: negative x 10 systems except as noted above    Brief summary:  67yMale POD# 4 CABGx4    Significant/Tath10ps events: none      PAST MEDICAL & SURGICAL HISTORY:  Hypertension      Hyperlipidemia      CAD (coronary artery disease)      Gout      S/P arthroscopy of shoulder      History of percutaneous coronary intervention      Status post phlebectomy            acetaminophen     Tablet .. 650 milliGRAM(s) Oral every 6 hours PRN  aspirin enteric coated 81 milliGRAM(s) Oral daily  atorvastatin 80 milliGRAM(s) Oral at bedtime  chlorhexidine 2% Cloths 1 Application(s) Topical daily  furosemide    Tablet 40 milliGRAM(s) Oral daily  gabapentin 300 milliGRAM(s) Oral three times a day  metoprolol tartrate 25 milliGRAM(s) Oral two times a day  ondansetron Injectable 4 milliGRAM(s) IV Push every 6 hours PRN  oxyCODONE    IR 5 milliGRAM(s) Oral every 4 hours PRN  oxyCODONE    IR 10 milliGRAM(s) Oral every 4 hours PRN  pantoprazole    Tablet 40 milliGRAM(s) Oral daily  polyethylene glycol 3350 17 Gram(s) Oral two times a day  senna 2 Tablet(s) Oral at bedtime  sodium chloride 0.9% lock flush 3 milliLiter(s) IV Push every 8 hours  MEDICATIONS  (PRN):  acetaminophen     Tablet .. 650 milliGRAM(s) Oral every 6 hours PRN Temp greater or equal to 38C (100.4F), Mild Pain (1 - 3)  ondansetron Injectable 4 milliGRAM(s) IV Push every 6 hours PRN Nausea and/or Vomiting  oxyCODONE    IR 5 milliGRAM(s) Oral every 4 hours PRN Moderate Pain (4 - 6)  oxyCODONE    IR 10 milliGRAM(s) Oral every 4 hours PRN Severe Pain (7 - 10)      Daily     Daily                               9.7    9.12  )-----------( 86       ( 30 Jan 2023 02:00 )             28.7   01-30    139  |  106  |  23.6<H>  ----------------------------<  101<H>  4.2   |  28.0  |  0.87    Ca    8.4      30 Jan 2023 02:00  Mg     2.2     01-30              Objective:  T(C): 37.3 (01-30-23 @ 15:47), Max: 37.3 (01-30-23 @ 15:47)  HR: 72 (01-31-23 @ 00:00) (64 - 82)  BP: 116/77 (01-31-23 @ 00:00) (108/70 - 144/72)  RR: 20 (01-31-23 @ 00:00) (12 - 20)  SpO2: 92% (01-31-23 @ 00:00) (90% - 99%)  Wt(kg): --CAPILLARY BLOOD GLUCOSE      I&O's Summary    29 Jan 2023 07:01  -  30 Jan 2023 07:00  --------------------------------------------------------  IN: 1320 mL / OUT: 1275 mL / NET: 45 mL    30 Jan 2023 07:01  -  31 Jan 2023 00:21  --------------------------------------------------------  IN: 1080 mL / OUT: 2475 mL / NET: -1395 mL        Physical Exam  General: NAD  Neuro: A+O x 3, non-focal, speech clear and intact  Psych: Appropriate affect  HEENT:  NCAT, No conjuctival edema or icterus, no thrush.  Pulm: CTA, equal bilaterally  CV: RRR,  +S1S2  Abd: soft, NT, ND, +BS  Ext: +DP Pulses b/l, no edema  Skin: Warm, dry, intact  Inc: MSI C/D/I/stable w/ dressing, LE vein harvest site C/D/I          Imaging:  < from: Xray Chest 1 View- PORTABLE-Routine (01.30.23 @ 05:22) >  INTERPRETATION:    Heart size and the mediastinum cannot be accurately evaluated on this   projection. Median sternotomy sutures and surgical clips are again seen.  Question right paratracheal/mediastinal lucency, not seen on the prior   image but not significantly changed from January 28, 2023.  Low lung volumes.  Right basilar linear atelectasis. Continued left mid and lower lung   linear and subsegmental atelectasis.  No pleural effusion or pneumothorax.  No acute bony abnormality.      IMPRESSION:  Question right paratracheal/mediastinal lucency, not seen on   the prior image but not significantly changed from January 28, 2023. This   is of indeterminate nature. Localized pneumomediastinum is possible.    Low lung volumes.    Right basilar linear atelectasis. Continued left mid and lower lung   linear and subsegmental atelectasis.    --- End of Report ---      < end of copied text >

## 2023-02-01 NOTE — PROGRESS NOTE ADULT - ASSESSMENT
67 year old male with a pmhx of CAD (PCI x1 in 2010), HTN, HLD, gout, family hx of early CAD (Father MI at age 50), presented to his Cardiologist with dyspnea on exertion since Nov 2022, s/p stress test that showed a mild defect in two areas (inferior and anterolateral). Cardiac catheterization revealed severe 4-vessel coronary artery disease with an elevated LVEDP and normal systolic left ventricular function. Patient s/p CABG x4 on 1/28/2023 with Dr. Booker. Post op course overall uneventful. 
67 year old male with a pmhx of CAD pci x1 in 2010, HTN, HLD, gout, family hx of early CAD Father MI at 50. Presented to his Cardiologist with dyspnea on exertion since Nov 2022.  He is s/p stress test that showed a mild defect in two areas (inferior and anterolateral). Cardiac catheterization revealed severe 4-vessel coronary artery disease with an elevated LVEDP and normal systolic left ventricular function. Now S/P CABG x4 on 1/28/2023.  
67 year old male with a pmhx of CAD pci x1 in 2010, HTN, HLD, gout, family hx of early CAD Father MI at 50. Presented to his Cardiologist with dyspnea on exertion since Nov 2022.  He is s/p stress test that showed a mild defect in two areas (inferior and anterolateral). Cardiac catheterization revealed severe 4-vessel coronary artery disease with an elevated LVEDP and normal systolic left ventricular function. Now S/P CABG x4 on 1/28/2023.    Post op course overall uneventful - recovering well 
67 year old male with a pmhx of CAD pci x1 in 2010, HTN, HLD, gout, family hx of early CAD Father MI at 50. Presented to his Cardiologist with dyspnea on exertion since Nov 2022.  He is s/p stress test that showed a mild defect in two areas (inferior and anterolateral). Cardiac catheterization revealed severe 4-vessel coronary artery disease with an elevated LVEDP and normal systolic left ventricular function. Now S/P CABG x4 on 1/28/2023.  
67 year old male with a pmhx of CAD pci x1 in 2010, HTN, HLD, gout, family hx of early CAD Father MI at 50. Presented to his Cardiologist with dyspnea on exertion since Nov 2022.  He is s/p stress test that showed a mild defect in two areas (inferior and anterolateral). Cardiac catheterization revealed severe 4-vessel coronary artery disease with an elevated LVEDP and normal systolic left ventricular function. Now S/P CABG x4 on 1/28/2023.    Post op course overall uneventful - recovering well

## 2023-02-01 NOTE — PROGRESS NOTE ADULT - PROVIDER SPECIALTY LIST ADULT
Critical Care
CT Surgery

## 2023-02-01 NOTE — PROGRESS NOTE ADULT - PROBLEM SELECTOR PLAN 2
Continue beta blocker
lopressor 25 q12
Will start beta blocker when Blood pressure allows, weaning from levophed.
lopressor 25 q12
Continue Lopressor as tolerated by HR and BP.

## 2023-02-01 NOTE — DISCHARGE NOTE NURSING/CASE MANAGEMENT/SOCIAL WORK - NSDCPEFALRISK_GEN_ALL_CORE
For information on Fall & Injury Prevention, visit: [email or web address] OR  [email or web address] OR  https://www.cdc.gov/steadi/patient.html

## 2023-02-01 NOTE — DISCHARGE NOTE NURSING/CASE MANAGEMENT/SOCIAL WORK - NSDCFUADDAPPT_GEN_ALL_CORE_FT
Please follow up with Dr. Booker on 2/15/23 at 12:30PM  The cardiac surgery office is located on the first floor of [facility] at [address], [place]. Please enter through the lobby. A [facility] employee will then direct you where to go.   --  Your Care Navigator Nurse Practitioner will be in touch to see you in your home within a few days from discharge. The Follow Your Heart program can help ensure you understand your medications, discharge instructions and answer any questions you may have at that time. They are also a great source to address concerns during the day and may be reached at [phone].

## 2023-02-02 ENCOUNTER — NON-APPOINTMENT (OUTPATIENT)
Age: 68
End: 2023-02-02

## 2023-02-02 ENCOUNTER — APPOINTMENT (OUTPATIENT)
Dept: CARE COORDINATION | Facility: HOME HEALTH | Age: 68
End: 2023-02-02
Payer: MEDICARE

## 2023-02-02 PROCEDURE — 99024 POSTOP FOLLOW-UP VISIT: CPT

## 2023-02-02 RX ORDER — SENNOSIDES 8.6 MG TABLETS 8.6 MG/1
8.6 TABLET ORAL
Qty: 30 | Refills: 0 | Status: ACTIVE | COMMUNITY
Start: 2023-02-02

## 2023-02-02 RX ORDER — OXYCODONE 5 MG/1
5 TABLET ORAL EVERY 6 HOURS
Qty: 60 | Refills: 0 | Status: ACTIVE | COMMUNITY
Start: 2023-02-02

## 2023-02-02 RX ORDER — ACETAMINOPHEN 325 MG/1
325 TABLET, FILM COATED ORAL EVERY 6 HOURS
Refills: 0 | Status: ACTIVE | COMMUNITY
Start: 2023-02-02

## 2023-02-02 RX ORDER — ROSUVASTATIN CALCIUM 5 MG/1
TABLET, FILM COATED ORAL
Refills: 0 | Status: DISCONTINUED | COMMUNITY
End: 2023-02-02

## 2023-02-02 RX ORDER — ROSUVASTATIN CALCIUM 20 MG/1
20 TABLET, FILM COATED ORAL DAILY
Qty: 30 | Refills: 10 | Status: ACTIVE | COMMUNITY
Start: 2023-02-02

## 2023-02-02 RX ORDER — METOPROLOL SUCCINATE 25 MG/1
25 TABLET, EXTENDED RELEASE ORAL
Refills: 0 | Status: DISCONTINUED | COMMUNITY
End: 2023-02-02

## 2023-02-02 RX ORDER — METOPROLOL TARTRATE 25 MG/1
25 TABLET, FILM COATED ORAL TWICE DAILY
Refills: 0 | Status: ACTIVE | COMMUNITY
Start: 2023-02-02

## 2023-02-02 RX ORDER — ROSUVASTATIN CALCIUM 20 MG/1
20 TABLET, FILM COATED ORAL DAILY
Qty: 30 | Refills: 10 | Status: DISCONTINUED | COMMUNITY
Start: 2023-02-02 | End: 2023-02-02

## 2023-02-02 RX ORDER — CLOPIDOGREL 75 MG/1
75 TABLET, FILM COATED ORAL DAILY
Refills: 0 | Status: ACTIVE | COMMUNITY
Start: 2023-02-02

## 2023-02-02 NOTE — HISTORY OF PRESENT ILLNESS
[FreeTextEntry1] : FOLLOW YOUR HEART HOME VISIT-[place]\[facility] Telehealth Visit made to  Stephanie for post discharge transitional care management and post follow up.  Patient of  s/p CABGx4.  Discharge on 2/1/23 from [facility]\par \par \par Mr. Robbins is a 67 year old male with a PMH of CAD PCI x1 in 2010, HTN, HLD, gout, family hx of early CAD Father MI at 50. He initially presented to his Cardiologist with dyspnea on exertion since November 2022.  He then underwent stress test that showed a mild defect in two areas (inferior and anterolateral). Subsequent cardiac catheterization revealed severe 4-vessel coronary artery disease with an elevated LVEDP and normal systolic left ventricular function. Ultimately, he underwent four vessel CABG with Dr. Booker on 1/27/23.  Post op course overall uneventful.  Patient seen today for  follow up s/p hospitalization. Denies any new complaints/issues at this time.  Compliant with all medications as per d/c order with +teach back. HCS initiated with Visual[employer].  Patient has scheduled follow up appointment. Reminded of CN role and contact number was provided to the patient.  Advised to call with any questions/concerns, verbalized understanding.\par

## 2023-02-02 NOTE — PHYSICAL EXAM
[FreeTextEntry1] : Upon visualization MT incision & CT incision CDI & URIEL, RSVG incision CDI & URIEL with ecchymosis noted no edema noted

## 2023-02-02 NOTE — REASON FOR VISIT
[Home] : at home, [unfilled] , at the time of the visit. [Other Location: e.g. Home (Enter Location, City,State)___] : at [unfilled] [Patient] : the patient [FreeTextEntry4] : Angelica (significant other)

## 2023-02-15 ENCOUNTER — APPOINTMENT (OUTPATIENT)
Dept: CARDIOTHORACIC SURGERY | Facility: CLINIC | Age: 68
End: 2023-02-15
Payer: MEDICARE

## 2023-02-15 VITALS
OXYGEN SATURATION: 98 % | DIASTOLIC BLOOD PRESSURE: 85 MMHG | HEART RATE: 83 BPM | RESPIRATION RATE: 16 BRPM | SYSTOLIC BLOOD PRESSURE: 117 MMHG

## 2023-02-15 DIAGNOSIS — I25.10 ATHEROSCLEROTIC HEART DISEASE OF NATIVE CORONARY ARTERY W/OUT ANGINA PECTORIS: ICD-10-CM

## 2023-02-15 DIAGNOSIS — Z95.1 PRESENCE OF AORTOCORONARY BYPASS GRAFT: ICD-10-CM

## 2023-02-15 PROCEDURE — 99024 POSTOP FOLLOW-UP VISIT: CPT

## 2023-02-16 NOTE — DIETITIAN INITIAL EVALUATION ADULT - NUTRITON FOCUSED PHYSICAL EXAM
no... Cyclosporine Counseling:  I discussed with the patient the risks of cyclosporine including but not limited to hypertension, gingival hyperplasia,myelosuppression, immunosuppression, liver damage, kidney damage, neurotoxicity, lymphoma, and serious infections. The patient understands that monitoring is required including baseline blood pressure, CBC, CMP, lipid panel and uric acid, and then 1-2 times monthly CMP and blood pressure.

## 2023-02-17 NOTE — REASON FOR VISIT
[Spouse] : spouse [de-identified] : CABG x 4 (LIMA-LAD, SVG- PDA, SVG- Ramus, SVG-OM) [de-identified] : 01/27/23 [de-identified] : Uneventful post operative course

## 2023-02-17 NOTE — ASSESSMENT
[FreeTextEntry1] : Today on exam patient's lungs clear bilaterally, sternum stable, incision clean, dry and intact. SVG site is clean, dry and intact. No peripheral edema noted. Instructed patient on importance of optimal glycemic control, daily showering, daily weights, incentive spirometer use, and increase ambulation as tolerated. Instructed to call office with any signs or symptoms of infection or weight gain of 2 or more pounds in 1 day or 3 or more pounds in 1 week.\par \par He is overall doing very well and is having follow up care with Cardiology in 2 days time. \par \par PLAN:\par - Continue care with cardiology \par - Return to care as needed\par \par \par \par \par \par \par \par I, Dr. Booker, personally performed the evaluation and management (E/M) services for this new patient.  That E/M includes conducting the initial examination, assessing all conditions, and establishing the plan of care.  Today, my ACP, Rolly Glynn NP was here to observe my evaluation and management services for this patient to be followed going forward.\par \par \par \par

## 2023-03-03 ENCOUNTER — TRANSCRIPTION ENCOUNTER (OUTPATIENT)
Age: 68
End: 2023-03-03

## 2023-03-20 ENCOUNTER — APPOINTMENT (OUTPATIENT)
Dept: CARDIOLOGY | Facility: CLINIC | Age: 68
End: 2023-03-20

## 2023-03-27 ENCOUNTER — APPOINTMENT (OUTPATIENT)
Dept: CARDIOLOGY | Facility: CLINIC | Age: 68
End: 2023-03-27
Payer: MEDICARE

## 2023-03-27 PROCEDURE — 93798 PHYS/QHP OP CAR RHAB W/ECG: CPT

## 2023-03-29 ENCOUNTER — APPOINTMENT (OUTPATIENT)
Dept: CARDIOLOGY | Facility: CLINIC | Age: 68
End: 2023-03-29
Payer: MEDICARE

## 2023-03-29 PROCEDURE — 93798 PHYS/QHP OP CAR RHAB W/ECG: CPT

## 2023-04-03 ENCOUNTER — APPOINTMENT (OUTPATIENT)
Dept: CARDIOLOGY | Facility: CLINIC | Age: 68
End: 2023-04-03

## 2023-04-05 ENCOUNTER — APPOINTMENT (OUTPATIENT)
Dept: CARDIOLOGY | Facility: CLINIC | Age: 68
End: 2023-04-05

## 2023-04-10 ENCOUNTER — APPOINTMENT (OUTPATIENT)
Dept: CARDIOLOGY | Facility: CLINIC | Age: 68
End: 2023-04-10

## 2023-04-12 ENCOUNTER — APPOINTMENT (OUTPATIENT)
Dept: CARDIOLOGY | Facility: CLINIC | Age: 68
End: 2023-04-12

## 2023-04-17 ENCOUNTER — APPOINTMENT (OUTPATIENT)
Dept: CARDIOLOGY | Facility: CLINIC | Age: 68
End: 2023-04-17

## 2023-04-19 ENCOUNTER — APPOINTMENT (OUTPATIENT)
Dept: CARDIOLOGY | Facility: CLINIC | Age: 68
End: 2023-04-19

## 2023-04-24 ENCOUNTER — APPOINTMENT (OUTPATIENT)
Dept: CARDIOLOGY | Facility: CLINIC | Age: 68
End: 2023-04-24

## 2023-04-26 ENCOUNTER — APPOINTMENT (OUTPATIENT)
Dept: CARDIOLOGY | Facility: CLINIC | Age: 68
End: 2023-04-26

## 2023-05-01 ENCOUNTER — APPOINTMENT (OUTPATIENT)
Dept: CARDIOLOGY | Facility: CLINIC | Age: 68
End: 2023-05-01

## 2023-05-03 ENCOUNTER — APPOINTMENT (OUTPATIENT)
Dept: CARDIOLOGY | Facility: CLINIC | Age: 68
End: 2023-05-03

## 2023-05-08 ENCOUNTER — APPOINTMENT (OUTPATIENT)
Dept: CARDIOLOGY | Facility: CLINIC | Age: 68
End: 2023-05-08

## 2023-05-10 ENCOUNTER — APPOINTMENT (OUTPATIENT)
Dept: CARDIOLOGY | Facility: CLINIC | Age: 68
End: 2023-05-10

## 2023-05-15 ENCOUNTER — APPOINTMENT (OUTPATIENT)
Dept: CARDIOLOGY | Facility: CLINIC | Age: 68
End: 2023-05-15

## 2023-05-17 ENCOUNTER — APPOINTMENT (OUTPATIENT)
Dept: CARDIOLOGY | Facility: CLINIC | Age: 68
End: 2023-05-17

## 2023-05-22 ENCOUNTER — APPOINTMENT (OUTPATIENT)
Dept: CARDIOLOGY | Facility: CLINIC | Age: 68
End: 2023-05-22

## 2023-05-24 ENCOUNTER — APPOINTMENT (OUTPATIENT)
Dept: CARDIOLOGY | Facility: CLINIC | Age: 68
End: 2023-05-24

## 2023-05-31 ENCOUNTER — APPOINTMENT (OUTPATIENT)
Dept: CARDIOLOGY | Facility: CLINIC | Age: 68
End: 2023-05-31

## 2023-06-05 ENCOUNTER — APPOINTMENT (OUTPATIENT)
Dept: CARDIOLOGY | Facility: CLINIC | Age: 68
End: 2023-06-05

## 2023-06-07 ENCOUNTER — APPOINTMENT (OUTPATIENT)
Dept: CARDIOLOGY | Facility: CLINIC | Age: 68
End: 2023-06-07

## 2023-06-12 ENCOUNTER — APPOINTMENT (OUTPATIENT)
Dept: CARDIOLOGY | Facility: CLINIC | Age: 68
End: 2023-06-12

## 2023-06-14 ENCOUNTER — APPOINTMENT (OUTPATIENT)
Dept: CARDIOLOGY | Facility: CLINIC | Age: 68
End: 2023-06-14

## 2023-06-19 ENCOUNTER — APPOINTMENT (OUTPATIENT)
Dept: CARDIOLOGY | Facility: CLINIC | Age: 68
End: 2023-06-19

## 2023-06-21 ENCOUNTER — APPOINTMENT (OUTPATIENT)
Dept: CARDIOLOGY | Facility: CLINIC | Age: 68
End: 2023-06-21

## 2023-06-26 ENCOUNTER — APPOINTMENT (OUTPATIENT)
Dept: CARDIOLOGY | Facility: CLINIC | Age: 68
End: 2023-06-26

## 2023-06-28 ENCOUNTER — APPOINTMENT (OUTPATIENT)
Dept: CARDIOLOGY | Facility: CLINIC | Age: 68
End: 2023-06-28

## 2024-01-10 NOTE — DIETITIAN INITIAL EVALUATION ADULT - ENTER TO (CAL/KG)
LOV 12/08/2023  NOV 06/07/2024    lisdexamfetamine (Vyvanse) 60 MG capsule 30 capsule 0 12/8/2023 --    Sig - Route: Take 1 capsule by mouth every morning. - Oral    Sent to pharmacy as: Lisdexamfetamine Dimesylate 60 MG Oral Capsule (Vyvanse)      
25

## 2024-01-18 NOTE — PROVIDER CONTACT NOTE (CRITICAL VALUE NOTIFICATION) - PERSON GIVING RESULT:
Tendon Sheath    Date/Time: 1/18/2024 2:30 PM    Performed by: Colton Stevenson MD  Authorized by: Colton Stevenson MD    Local anesthetic:  Bupivacaine 0.25% without epinephrine  Location:  Wrist  Site:  R first doral compartment  Medications:  40 mg triamcinolone acetonide 40 mg/mL    
[place]
lab- Tamara Gonzalez

## 2024-05-03 NOTE — DISCHARGE NOTE PROVIDER - PROVIDER TOKENS
Post-Op Assessment Note    CV Status:  Stable  Pain Score: 0    Pain management: adequate       Mental Status:  Sleepy   Hydration Status:  Stable   PONV Controlled:  None   Airway Patency:  Patent     Post Op Vitals Reviewed: Yes    No anethesia notable event occurred.    Staff: Anesthesiologist, CRNA               BP   161/77   Temp      Pulse  62   Resp   14   SpO2   92/ room air       Infectious Disease Infectious Disease ENT Internal Medicine Internal Medicine Internal Medicine PROVIDER:[TOKEN:[[id number]],FOLLOWUP:[2 weeks],ESTABLISHEDPATIENT:[T]],PROVIDER:[TOKEN:[[id number]],FOLLOWUP:[2 weeks],ESTABLISHEDPATIENT:[T]]

## 2025-04-16 PROBLEM — K80.20 GALLSTONES: Status: ACTIVE | Noted: 2025-04-16

## 2025-04-16 PROBLEM — K83.1 COMMON BILE DUCT (CBD) OBSTRUCTION: Status: ACTIVE | Noted: 2025-04-16

## 2025-04-17 ENCOUNTER — INPATIENT (INPATIENT)
Facility: HOSPITAL | Age: 70
LOS: 5 days | Discharge: HOME | DRG: 446 | End: 2025-04-23
Attending: STUDENT IN AN ORGANIZED HEALTH CARE EDUCATION/TRAINING PROGRAM | Admitting: INTERNAL MEDICINE
Payer: MEDICARE

## 2025-04-17 ENCOUNTER — APPOINTMENT (OUTPATIENT)
Dept: SURGERY | Facility: CLINIC | Age: 70
End: 2025-04-17

## 2025-04-17 VITALS
HEIGHT: 69 IN | TEMPERATURE: 97 F | OXYGEN SATURATION: 99 % | WEIGHT: 210.1 LBS | HEART RATE: 61 BPM | DIASTOLIC BLOOD PRESSURE: 96 MMHG | SYSTOLIC BLOOD PRESSURE: 167 MMHG | RESPIRATION RATE: 18 BRPM

## 2025-04-17 VITALS
TEMPERATURE: 97.2 F | BODY MASS INDEX: 31.1 KG/M2 | HEIGHT: 69 IN | RESPIRATION RATE: 16 BRPM | WEIGHT: 210 LBS | DIASTOLIC BLOOD PRESSURE: 84 MMHG | OXYGEN SATURATION: 99 % | SYSTOLIC BLOOD PRESSURE: 127 MMHG | HEART RATE: 62 BPM

## 2025-04-17 DIAGNOSIS — K83.1 OBSTRUCTION OF BILE DUCT: ICD-10-CM

## 2025-04-17 DIAGNOSIS — Z98.61 CORONARY ANGIOPLASTY STATUS: Chronic | ICD-10-CM

## 2025-04-17 DIAGNOSIS — R10.13 EPIGASTRIC PAIN: ICD-10-CM

## 2025-04-17 DIAGNOSIS — K80.50 CALCULUS OF BILE DUCT WITHOUT CHOLANGITIS OR CHOLECYSTITIS WITHOUT OBSTRUCTION: ICD-10-CM

## 2025-04-17 DIAGNOSIS — R79.89 OTHER SPECIFIED ABNORMAL FINDINGS OF BLOOD CHEMISTRY: ICD-10-CM

## 2025-04-17 DIAGNOSIS — Z98.890 OTHER SPECIFIED POSTPROCEDURAL STATES: Chronic | ICD-10-CM

## 2025-04-17 DIAGNOSIS — Z95.1 PRESENCE OF AORTOCORONARY BYPASS GRAFT: Chronic | ICD-10-CM

## 2025-04-17 DIAGNOSIS — I25.10 ATHEROSCLEROTIC HEART DISEASE OF NATIVE CORONARY ARTERY WITHOUT ANGINA PECTORIS: ICD-10-CM

## 2025-04-17 DIAGNOSIS — Z95.1 PRESENCE OF AORTOCORONARY BYPASS GRAFT: ICD-10-CM

## 2025-04-17 DIAGNOSIS — K80.20 CALCULUS OF GALLBLADDER W/OUT CHOLECYSTITIS W/OUT OBSTRUCTION: ICD-10-CM

## 2025-04-17 LAB
ALBUMIN SERPL ELPH-MCNC: 4.4 G/DL — SIGNIFICANT CHANGE UP (ref 3.3–5)
ALP SERPL-CCNC: 113 U/L — SIGNIFICANT CHANGE UP (ref 40–120)
ALT FLD-CCNC: 143 U/L — HIGH (ref 10–45)
ANION GAP SERPL CALC-SCNC: 13 MMOL/L — SIGNIFICANT CHANGE UP (ref 5–17)
APPEARANCE UR: ABNORMAL
APTT BLD: 31.5 SEC — SIGNIFICANT CHANGE UP (ref 24.5–35.6)
AST SERPL-CCNC: 43 U/L — HIGH (ref 10–40)
BACTERIA # UR AUTO: NEGATIVE /HPF — SIGNIFICANT CHANGE UP
BASOPHILS # BLD AUTO: 0.04 K/UL — SIGNIFICANT CHANGE UP (ref 0–0.2)
BASOPHILS NFR BLD AUTO: 0.8 % — SIGNIFICANT CHANGE UP (ref 0–2)
BILIRUB SERPL-MCNC: 0.8 MG/DL — SIGNIFICANT CHANGE UP (ref 0.2–1.2)
BILIRUB UR-MCNC: ABNORMAL
BLD GP AB SCN SERPL QL: NEGATIVE — SIGNIFICANT CHANGE UP
BUN SERPL-MCNC: 12 MG/DL — SIGNIFICANT CHANGE UP (ref 7–23)
CALCIUM SERPL-MCNC: 8.9 MG/DL — SIGNIFICANT CHANGE UP (ref 8.4–10.5)
CAST: 3 /LPF — SIGNIFICANT CHANGE UP (ref 0–4)
CHLORIDE SERPL-SCNC: 103 MMOL/L — SIGNIFICANT CHANGE UP (ref 96–108)
CO2 SERPL-SCNC: 22 MMOL/L — SIGNIFICANT CHANGE UP (ref 22–31)
COLOR SPEC: SIGNIFICANT CHANGE UP
CREAT SERPL-MCNC: 0.98 MG/DL — SIGNIFICANT CHANGE UP (ref 0.5–1.3)
DIFF PNL FLD: NEGATIVE — SIGNIFICANT CHANGE UP
EGFR: 84 ML/MIN/1.73M2 — SIGNIFICANT CHANGE UP
EGFR: 84 ML/MIN/1.73M2 — SIGNIFICANT CHANGE UP
EOSINOPHIL # BLD AUTO: 0.11 K/UL — SIGNIFICANT CHANGE UP (ref 0–0.5)
EOSINOPHIL NFR BLD AUTO: 2.2 % — SIGNIFICANT CHANGE UP (ref 0–6)
GAS PNL BLDV: SIGNIFICANT CHANGE UP
GLUCOSE SERPL-MCNC: 86 MG/DL — SIGNIFICANT CHANGE UP (ref 70–99)
GLUCOSE UR QL: NEGATIVE MG/DL — SIGNIFICANT CHANGE UP
HCT VFR BLD CALC: 46.9 % — SIGNIFICANT CHANGE UP (ref 39–50)
HGB BLD-MCNC: 16.2 G/DL — SIGNIFICANT CHANGE UP (ref 13–17)
IMM GRANULOCYTES NFR BLD AUTO: 0.2 % — SIGNIFICANT CHANGE UP (ref 0–0.9)
INR BLD: 0.99 RATIO — SIGNIFICANT CHANGE UP (ref 0.85–1.16)
KETONES UR-MCNC: ABNORMAL MG/DL
LEUKOCYTE ESTERASE UR-ACNC: NEGATIVE — SIGNIFICANT CHANGE UP
LYMPHOCYTES # BLD AUTO: 1.22 K/UL — SIGNIFICANT CHANGE UP (ref 1–3.3)
LYMPHOCYTES # BLD AUTO: 24 % — SIGNIFICANT CHANGE UP (ref 13–44)
MAGNESIUM SERPL-MCNC: 2.2 MG/DL — SIGNIFICANT CHANGE UP (ref 1.6–2.6)
MCHC RBC-ENTMCNC: 30.5 PG — SIGNIFICANT CHANGE UP (ref 27–34)
MCHC RBC-ENTMCNC: 34.5 G/DL — SIGNIFICANT CHANGE UP (ref 32–36)
MCV RBC AUTO: 88.2 FL — SIGNIFICANT CHANGE UP (ref 80–100)
MONOCYTES # BLD AUTO: 0.44 K/UL — SIGNIFICANT CHANGE UP (ref 0–0.9)
MONOCYTES NFR BLD AUTO: 8.6 % — SIGNIFICANT CHANGE UP (ref 2–14)
NEUTROPHILS # BLD AUTO: 3.27 K/UL — SIGNIFICANT CHANGE UP (ref 1.8–7.4)
NEUTROPHILS NFR BLD AUTO: 64.2 % — SIGNIFICANT CHANGE UP (ref 43–77)
NITRITE UR-MCNC: NEGATIVE — SIGNIFICANT CHANGE UP
NRBC BLD AUTO-RTO: 0 /100 WBCS — SIGNIFICANT CHANGE UP (ref 0–0)
PH UR: 5 — SIGNIFICANT CHANGE UP (ref 5–8)
PLATELET # BLD AUTO: 160 K/UL — SIGNIFICANT CHANGE UP (ref 150–400)
POTASSIUM SERPL-MCNC: 4.1 MMOL/L — SIGNIFICANT CHANGE UP (ref 3.5–5.3)
POTASSIUM SERPL-SCNC: 4.1 MMOL/L — SIGNIFICANT CHANGE UP (ref 3.5–5.3)
PROT SERPL-MCNC: 7.2 G/DL — SIGNIFICANT CHANGE UP (ref 6–8.3)
PROT UR-MCNC: NEGATIVE MG/DL — SIGNIFICANT CHANGE UP
PROTHROM AB SERPL-ACNC: 11.4 SEC — SIGNIFICANT CHANGE UP (ref 9.9–13.4)
RBC # BLD: 5.32 M/UL — SIGNIFICANT CHANGE UP (ref 4.2–5.8)
RBC # FLD: 12.5 % — SIGNIFICANT CHANGE UP (ref 10.3–14.5)
RBC CASTS # UR COMP ASSIST: 2 /HPF — SIGNIFICANT CHANGE UP (ref 0–4)
RH IG SCN BLD-IMP: POSITIVE — SIGNIFICANT CHANGE UP
SODIUM SERPL-SCNC: 138 MMOL/L — SIGNIFICANT CHANGE UP (ref 135–145)
SP GR SPEC: 1.03 — SIGNIFICANT CHANGE UP (ref 1–1.03)
SQUAMOUS # UR AUTO: 3 /HPF — SIGNIFICANT CHANGE UP (ref 0–5)
UROBILINOGEN FLD QL: 1 MG/DL — SIGNIFICANT CHANGE UP (ref 0.2–1)
WBC # BLD: 5.09 K/UL — SIGNIFICANT CHANGE UP (ref 3.8–10.5)
WBC # FLD AUTO: 5.09 K/UL — SIGNIFICANT CHANGE UP (ref 3.8–10.5)
WBC UR QL: 1 /HPF — SIGNIFICANT CHANGE UP (ref 0–5)

## 2025-04-17 PROCEDURE — 99285 EMERGENCY DEPT VISIT HI MDM: CPT

## 2025-04-17 PROCEDURE — 71045 X-RAY EXAM CHEST 1 VIEW: CPT | Mod: 26

## 2025-04-17 PROCEDURE — 99223 1ST HOSP IP/OBS HIGH 75: CPT | Mod: 57

## 2025-04-17 PROCEDURE — 93010 ELECTROCARDIOGRAM REPORT: CPT

## 2025-04-17 PROCEDURE — 99223 1ST HOSP IP/OBS HIGH 75: CPT | Mod: GC

## 2025-04-17 PROCEDURE — ZZZZZ: CPT

## 2025-04-17 RX ORDER — ONDANSETRON HCL/PF 4 MG/2 ML
4 VIAL (ML) INJECTION ONCE
Refills: 0 | Status: COMPLETED | OUTPATIENT
Start: 2025-04-17 | End: 2025-04-17

## 2025-04-17 RX ORDER — KETOROLAC TROMETHAMINE 30 MG/ML
15 INJECTION, SOLUTION INTRAMUSCULAR; INTRAVENOUS EVERY 8 HOURS
Refills: 0 | Status: DISCONTINUED | OUTPATIENT
Start: 2025-04-17 | End: 2025-04-18

## 2025-04-17 RX ORDER — MELATONIN 5 MG
5 TABLET ORAL AT BEDTIME
Refills: 0 | Status: DISCONTINUED | OUTPATIENT
Start: 2025-04-17 | End: 2025-04-18

## 2025-04-17 RX ORDER — KETOROLAC TROMETHAMINE 30 MG/ML
15 INJECTION, SOLUTION INTRAMUSCULAR; INTRAVENOUS ONCE
Refills: 0 | Status: DISCONTINUED | OUTPATIENT
Start: 2025-04-17 | End: 2025-04-17

## 2025-04-17 RX ADMIN — Medication 5 MILLIGRAM(S): at 23:09

## 2025-04-17 RX ADMIN — KETOROLAC TROMETHAMINE 15 MILLIGRAM(S): 30 INJECTION, SOLUTION INTRAMUSCULAR; INTRAVENOUS at 18:02

## 2025-04-17 RX ADMIN — Medication 1000 MILLILITER(S): at 14:57

## 2025-04-17 RX ADMIN — Medication 40 MILLIGRAM(S): at 17:55

## 2025-04-17 RX ADMIN — KETOROLAC TROMETHAMINE 15 MILLIGRAM(S): 30 INJECTION, SOLUTION INTRAMUSCULAR; INTRAVENOUS at 17:32

## 2025-04-17 NOTE — CONSULT NOTE ADULT - ASSESSMENT
70yo M w/ PMHx CAD s/p CABG 2023 on Plavix (last dose 6 days prior to arrival) and HLD who presents for post-prandial lower chest/epigastric abdominal pain that first started 2 months ago and has been occurring intermittently since then with acute worsening of the same pain this past Monday 4/14 prompting multiple outside hospital and clinic visits in the interim. Patient underwent at CT scan on 4/14 at OSH (details below) that reveals a 4.8mm distal CBD stone with CBD measuring 8mm above the stone.    #Post-prandial Abdominal Pain  #Choledocholithiasis with CBD Dilation  #CABG on Plavix (last dose 6 days prior to arrival)    DDx: concern for choledocholithiasis on imaging from OSH 4/14 (detailed above) with low suspicion for cholangitis at this time. Patient AOx3, HDS, ambulatory, no current f/c/n/v/ap. However awaiting ER lab work results at this time.    Recommendations:  -admit  -follow up ER labwork  -trend daily CBC, CMP  -ok for diet today - patient finding relief of AP with low fat diet and decreased PO intake  -NPO at midnight  -early AM labs with corrections as needed. Please ensure CBC, CMP, coags and active T&S  -tentative plan for EGD/ERCP tomorrow 4/18 at 9am  -rec surgery evaluation for cholecystectomy consideration  -Te as per primary team      All recommendations preliminary until note signed by service attending.    Thank you for involving us in the care of this patient. Please contact should any concern or questions arise.    Yevgeniy Musa MD   Gastroenterology/Hepatology Fellow PGY-6  Available on [employer] Teams 7am - 5pm  [phone]

## 2025-04-17 NOTE — ED PROVIDER NOTE - NEUROLOGICAL, MLM
Spot Size: 2 x 2 cm Total Square Area In Cm2 (Required For Proper Billing- Whole Numbers Only Please): 9 Fluence #1 (J/Cm2 Or Mj/Cm2): 3600 mj Treatment Number: 25 Consent: Written consent obtained, risks reviewed including but not limited to crusting, scabbing, blistering, scarring, darker or lighter pigmentary change, incidental hair removal, bruising, and/or incomplete removal. Mode: tile Location #1: legs, knees, scalp Detail Level: Detailed Device Serial Number (Optional): Dante/aydee Fluence Units: mJ/cm2 Post-Care Instructions: I reviewed with the patient in detail post-care instructions. Patient should stay away from the sun and wear sun protection until treated areas are fully healed. Alert and oriented, no focal deficits, no motor or sensory deficits.

## 2025-04-17 NOTE — ED PROVIDER NOTE - ATTENDING APP SHARED VISIT CONTRIBUTION OF CARE
Attending Adrianne: I performed a face to face evaluation of the patient and obtained a history as well as performed a physical exam. I have discussed their management with the LINH. I have reviewed the LINH note and agree with the documented findings and plan of care, except as noted. This was a shared visit with an LINH. I reviewed and verified the documentation and independently performed my own history/exam/medical decision making. My medical decision making and observations are found above. Please refer to any progress notes for updates on clinical course. My notes supersedes the above LINH note in case of discrepancy

## 2025-04-17 NOTE — CONSULT NOTE ADULT - SUBJECTIVE AND OBJECTIVE BOX
Chief Complaint:  Patient is a 69y old  Male who presents with a chief complaint of     HPI:  68yo M w/ PMHx CAD s/p CABG 2023 on Plavix (last dose 6 days prior to arrival) and HLD who presents for post-prandial lower chest/epigastric abdominal pain that first started 2 months ago and has been occurring intermittently since then with acute worsening of the same pain this past Monday 4/14 prompting multiple outside hospital and clinic visits in the interim. Patient underwent at CT scan on 4/14 at OSH (details below) that reveals a 4.8mm distal CBD stone with CBD measuring 8mm above the stone. On assessment in ER this afternoon patient appears well. Denies n/v/f/c/ap currently. He has been adhering to a low fat diet and limiting his overall PO intake which has helped the pain subside. He has never had an EGD before. He reports last colonoscopy 15 years ago that was normal. Never had issues with anesthesia. He denies any other focus of infection at this time (no dysuria, cough, diarrhea, rash etc.). No family hx of GI malig. No one in family with gallstones. Denies heavy EtOH, never smoker, no drug use. Apart from Plavix which he held 6 days ago patient denies any other AC or antiplt use. Plan for direct admission and EGD/ERCP tomorrow 4/18.    Allergies:  No Known Allergies    Home Medications:  Plavix - last dose 6 days prior to admission    PMHX/PSHX:    CAD s/p CABG 2023  HLD    Family history:      Denies family history of colon cancer/polyps, stomach cancer/polyps, pancreatic cancer/masses, liver cancer/disease, ovarian cancer and endometrial cancer.    Social History:     Tob: Denies  EtOH: Social  Illicit Drugs: Denies    ROS:   General:  No wt loss, fevers, chills, night sweats, fatigue  Eyes:  Good vision, no reported pain  ENT:  No sore throat, pain, runny nose, dysphagia  CV:  No pain, palpitations, hypo/hypertension  Pulm:  No dyspnea, cough, tachypnea, wheezing  GI:  As per HPI  :  No pain, bleeding, incontinence, nocturia  Muscle:  No pain, weakness  Neuro:  No weakness, tingling, memory problems  Psych:  No fatigue, insomnia, mood problems, depression  Endocrine:  No polyuria, polydipsia, cold/heat intolerance  Heme:  No petechiae, ecchymosis, easy bruisability  Skin:  No rash, tattoos, scars, edema    PHYSICAL EXAM:   GENERAL:  No acute distress, independently ambulatory  HEENT:  Normocephalic/atraumatic, no scleral icterus  CHEST:  No accessory muscle use  HEART:  Regular rate and rhythm  ABDOMEN:  Soft, non-tender, non-distended  EXTREMITIES: No b/l LE edema  SKIN:  No jaundice  NEURO:  Alert and oriented x 3    Vital Signs:  Vital Signs Last 24 Hrs  T(C): 36.3 (17 Apr 2025 13:21), Max: 36.3 (17 Apr 2025 13:21)  T(F): 97.4 (17 Apr 2025 13:21), Max: 97.4 (17 Apr 2025 13:21)  HR: 61 (17 Apr 2025 13:21) (61 - 61)  BP: 167/96 (17 Apr 2025 13:21) (167/96 - 167/96)  BP(mean): --  RR: 18 (17 Apr 2025 13:21) (18 - 18)  SpO2: 99% (17 Apr 2025 13:21) (99% - 99%)    Parameters below as of 17 Apr 2025 13:21  Patient On (Oxygen Delivery Method): room air      Daily Height in cm: 175.26 (17 Apr 2025 13:21)    Daily     LABS:      Imaging from Outside Hospital:  Reading Date  4/14/2025  Narrative & Impression  PROCEDURE INFORMATION:  Exam: CT Abdomen And Pelvis With Contrast Exam date and time: 4/14/2025 9:50 PM  Age: 69 years old  Clinical indication: Abdominal pain; Generalized; Additional info: Biliary obstruction suspected (ped 0-17y)  TECHNIQUE:  Imaging protocol: Computed tomography of the abdomen and pelvis with contrast    Radiation optimization: All CT scans at this facility use at least one of these dose optimization techniques: automated exposure control; mA and/or kV adjustment per patient size (includes targeted exams where dose is matched to clinical indication); or iterative reconstruction.    Contrast material: [id number]; Contrast volume: 90 ml; Contrast route: INTRAVEN (V):    COMPARISON:    CR XR Chest 1 View 4/14/2025 8:40 PM    FINDINGS:    Liver: Normal. No mass.    Gailbladder and biliary ducts: There is hydrops of the gallbladder. There is no gallbladder wall thickening or pericholecystic fluid. There is mild intrahepatic billary dilatation. There is a 4.8 mm stone in the distal common bile duct in the head of the pancreas. No other gallstones are seen. Common bile duct measures 8 mm proximal to the stone.  Pancreas: Normal. No ductal dilation.  Spleen: Normal. No splenomegaly.  Adrenal glands: Normal. No mass.  Kidneys and ureters: Normal. No hydronephrosis.  Stomach and bowel: Unremarkable. No obstruction. No mucosal thickenino  Appendix: The appendix is seen and appears          Chief Complaint:  Patient is a 69y old  Male who presents with a chief complaint of abdominal pain    HPI:  68yo M w/ PMHx CAD s/p CABG 2023 on Plavix (last dose 6 days prior to arrival) and HLD who presents for post-prandial lower chest/epigastric abdominal pain that first started 2 months ago and has been occurring intermittently since then with acute worsening of the same pain this past Monday 4/14 prompting multiple outside hospital and clinic visits in the interim. Patient underwent at CT scan on 4/14 at OSH (details below) that reveals a 4.8mm distal CBD stone with CBD measuring 8mm above the stone. On assessment in ER this afternoon patient appears well. Denies n/v/f/c/ap currently. He has been adhering to a low fat diet and limiting his overall PO intake which has helped the pain subside. He has never had an EGD before. He reports last colonoscopy 15 years ago that was normal. Never had issues with anesthesia. He denies any other focus of infection at this time (no dysuria, cough, diarrhea, rash etc.). No family hx of GI malig. No one in family with gallstones. Denies heavy EtOH, never smoker, no drug use. Apart from Plavix which he held 6 days ago patient denies any other AC or antiplt use. Plan for direct admission and EGD/ERCP tomorrow 4/18.    Allergies:  No Known Allergies    Home Medications:  Plavix - last dose 6 days prior to admission    PMHX/PSHX:    CAD s/p CABG 2023  HLD    Family history:      Denies family history of colon cancer/polyps, stomach cancer/polyps, pancreatic cancer/masses, liver cancer/disease, ovarian cancer and endometrial cancer.    Social History:     Tob: Denies  EtOH: Social  Illicit Drugs: Denies    ROS:   General:  No wt loss, fevers, chills, night sweats, fatigue  Eyes:  Good vision, no reported pain  ENT:  No sore throat, pain, runny nose, dysphagia  CV:  No pain, palpitations, hypo/hypertension  Pulm:  No dyspnea, cough, tachypnea, wheezing  GI:  As per HPI  :  No pain, bleeding, incontinence, nocturia  Muscle:  No pain, weakness  Neuro:  No weakness, tingling, memory problems  Psych:  No fatigue, insomnia, mood problems, depression  Endocrine:  No polyuria, polydipsia, cold/heat intolerance  Heme:  No petechiae, ecchymosis, easy bruisability  Skin:  No rash, tattoos, scars, edema    PHYSICAL EXAM:   GENERAL:  No acute distress, independently ambulatory  HEENT:  Normocephalic/atraumatic, no scleral icterus  CHEST:  No accessory muscle use  HEART:  Regular rate and rhythm  ABDOMEN:  Soft, non-tender, non-distended  EXTREMITIES: No b/l LE edema  SKIN:  No jaundice  NEURO:  Alert and oriented x 3    Vital Signs:  Vital Signs Last 24 Hrs  T(C): 36.3 (17 Apr 2025 13:21), Max: 36.3 (17 Apr 2025 13:21)  T(F): 97.4 (17 Apr 2025 13:21), Max: 97.4 (17 Apr 2025 13:21)  HR: 61 (17 Apr 2025 13:21) (61 - 61)  BP: 167/96 (17 Apr 2025 13:21) (167/96 - 167/96)  BP(mean): --  RR: 18 (17 Apr 2025 13:21) (18 - 18)  SpO2: 99% (17 Apr 2025 13:21) (99% - 99%)    Parameters below as of 17 Apr 2025 13:21  Patient On (Oxygen Delivery Method): room air      Daily Height in cm: 175.26 (17 Apr 2025 13:21)    Daily     LABS:      Imaging from Outside Hospital:  Reading Date  4/14/2025  Narrative & Impression  PROCEDURE INFORMATION:  Exam: CT Abdomen And Pelvis With Contrast Exam date and time: 4/14/2025 9:50 PM  Age: 69 years old  Clinical indication: Abdominal pain; Generalized; Additional info: Biliary obstruction suspected (ped 0-17y)  TECHNIQUE:  Imaging protocol: Computed tomography of the abdomen and pelvis with contrast    Radiation optimization: All CT scans at this facility use at least one of these dose optimization techniques: automated exposure control; mA and/or kV adjustment per patient size (includes targeted exams where dose is matched to clinical indication); or iterative reconstruction.    Contrast material: [id number]; Contrast volume: 90 ml; Contrast route: INTRAVEN (V):    COMPARISON:    CR XR Chest 1 View 4/14/2025 8:40 PM    FINDINGS:    Liver: Normal. No mass.    Gailbladder and biliary ducts: There is hydrops of the gallbladder. There is no gallbladder wall thickening or pericholecystic fluid. There is mild intrahepatic billary dilatation. There is a 4.8 mm stone in the distal common bile duct in the head of the pancreas. No other gallstones are seen. Common bile duct measures 8 mm proximal to the stone.  Pancreas: Normal. No ductal dilation.  Spleen: Normal. No splenomegaly.  Adrenal glands: Normal. No mass.  Kidneys and ureters: Normal. No hydronephrosis.  Stomach and bowel: Unremarkable. No obstruction. No mucosal thickenino  Appendix: The appendix is seen and appears

## 2025-04-17 NOTE — ED ADULT NURSE NOTE - OBJECTIVE STATEMENT
70yo M aaox4 h/o HTN, HLD, CAD x1 stent on plavix, presents to ED from home as per pt he has been having RUQ for months worsening in the last few days, pt reports the last he  is here for admission : have surgery tomorrow: gallstone removed, at this time Pt denies CP, SOB, HA, vision changes, n/v/d, fevers chills, abdominal pain. URI symptoms Safety and comfort measures initiated- bed placed in lowest position and side rails raised.

## 2025-04-17 NOTE — H&P ADULT - NSHPREVIEWOFSYSTEMS_GEN_ALL_CORE
Gen: no loss of wt no loss of appetite  ENT: no dizziness no hearing loss  Ophth: no blurring of vision no loss of vision  Resp: No cough no sputum production  CVS: No chest pain no palpitations no orthopnea  GI: see above HPI + nausea no vomiting   : no dysuria, hematuria  Endo: no polyuria no excessive sweating  Neuro: no weakness no paresthesias  Heme: No petechiae no easy bruising  Msk: No joint pain no swelling  Skin: No rash no itching

## 2025-04-17 NOTE — H&P ADULT - NSHPPHYSICALEXAM_GEN_ALL_CORE
PHYSICAL EXAM: vital signs noted on [place]  in no apparent distress  HEENT: LING EOMI  Neck: Supple, no JVD, no thyromegaly  Lungs: no wheeze, no crackles  vertical healer CABG scar  CVS: S1 S2 no M/R/G  Abdomen: no tenderness, no organomegaly, BS present  Neuro: AO x 3 no focal weakness, no sensory abnormalities  Skin: warm, dry  Ext: no cyanosis or clubbing, no edema  Msk: no joint swelling or deformities  Back: no CVA tenderness, no kyphosis/scoliosis

## 2025-04-17 NOTE — CONSULT NOTE ADULT - ASSESSMENT
68yo M w/ PMHx CAD s/p CABG 2023 on Plavix (last dose 6 days prior to arrival) with OSH scan s/o choledocholithiasis w/o cholecystitis. Planned for EGD/ ERCP with GI on 4/18.    Plan:  - Risk stratification for lap jac  - Cardiology consult  - Will wait for ERCP results  - Will follow    Plan pending discussion with attending.    ACS/Trauma surgery  p [id number]  68yo M w/ PMHx CAD s/p CABG 2023 on Plavix (last dose 6 days prior to arrival) with OSH scan s/o choledocholithiasis w/o cholecystitis. Planned for EGD/ ERCP with GI on 4/18.    Plan:  - Risk stratification for lap jac  - Cardiology consult  - Will wait for ERCP results  - Will follow    Patient seen with ACS on call Dr Carroll.    ACS/Trauma surgery  p [phone]  70yo M w/ PMHx CAD s/p CABG 2023 on Plavix (last dose 6 days prior to arrival) with OSH scan s/o choledocholithiasis w/o cholecystitis. Planned for EGD/ ERCP with GI on 4/18.    Plan:  - Risk stratification for lap jac after ERCP  - Cardiology consult  - Will wait for ERCP results  - Will follow    Patient seen with ACS on call Dr Carroll.    ACS/Trauma surgery  p [phone]

## 2025-04-17 NOTE — H&P ADULT - NSHPADDITIONALINFOADULT_GEN_ALL_CORE
discussed with patient, expresses understanding of treatment plans.  discussed with patient's wife at bedside in detail

## 2025-04-17 NOTE — ED PROVIDER NOTE - RAPID ASSESSMENT
69-year-old male past medical history of hypertension, hyperlipidemia, CAD s/p CABG presenting with abdominal pain.  Patient reports that he has had intermittent episodes of right upper quadrant abdominal pain for the past few months.  Patient states that pain worsened acutely 3 to 4 days ago.  Patient was seen in outside hospital in [facility] and diagnosed with a CBD stone.  Patient followed up with his gastroenterologist and sent to the ER for likely ERCP.    **Patient was rapidly assessed by me, Pablo Colmenares PA-C. A limited history was obtained. The patient will be seen and further examined/worked up in the main ED and their care will be completed by the main ED team. Receiving team will follow up on labs, analgesia, any clinical imaging, and perform reassessment and disposition of the patient as clinically indicated. All decisions regarding the progression of care will be made at their discretion.

## 2025-04-17 NOTE — PATIENT PROFILE ADULT - FALL HARM RISK - HARM RISK INTERVENTIONS
Assistance with ambulation/Assistance OOB with selected safe patient handling equipment/Communicate Risk of Fall with Harm to all staff/Tailored Fall Risk Interventions/Visual Cue: Yellow wristband and red socks/Bed in lowest position, wheels locked, appropriate side rails in place/Call bell, personal items and telephone in reach/Instruct patient to call for assistance before getting out of bed or chair/Non-slip footwear when patient is out of bed/[place] to call system/Physically safe environment - no spills, clutter or unnecessary equipment/Purposeful Proactive Rounding/Room/bathroom lighting operational, light cord in reach

## 2025-04-17 NOTE — ED PROVIDER NOTE - NS_BEDUNITTYPES_ED_ALL_ED
Next year will need Pap smear  Cholesterol was mildly elevated so repeat cholesterol next year  Encouraged to lose weight and follow a low-cholesterol diet   MEDICINE

## 2025-04-17 NOTE — ED ADULT NURSE NOTE - NSFALLUNIVINTERV_ED_ALL_ED
Bed/Stretcher in lowest position, wheels locked, appropriate side rails in place/Call bell, personal items and telephone in reach/Instruct patient to call for assistance before getting out of bed/chair/stretcher/Non-slip footwear applied when patient is off stretcher/[place] to call system/Physically safe environment - no spills, clutter or unnecessary equipment/Purposeful proactive rounding/Room/bathroom lighting operational, light cord in reach

## 2025-04-17 NOTE — CONSULT NOTE ADULT - ATTENDING COMMENTS
As above  70yo M w/ PMHx CAD s/p CABG 2023 on Plavix (last dose 6 days prior to arrival) and HLD who presents with symptomatic choledocholithiasis  Plan for ERCP tomorrow AM @9AM  Repeat CMP, CBC, INR  Continue to hold Plavix    Thank you for this interesting consult.  Please call the advanced GI service with any questions or concerns.
Patient seen and examined and agree with above.  69 year old man with PMH significant for CAD with a history of CABG on plavix, last taken 6 days prior and HLD who presents with post prandial epigastric abdominal pain which has been intermittent 2 months ago. He had an outside CT scan on 4/14 which demonstrated a hydrops gallbladder and a distal CBD stone 4.8 mm and CBD measuring 8 mm. The patient is planned to go for an ERCP on 4/18.  I have reviewed PMH, PSH, labs and meds.   On exam he does not have tenderness at this time. There is no rebound or guarding.  He is hemodynamically appropriate.   Labs reviewed.  WBC 5.09  AST/ALT 43/143    A/P: 69 yr old man with PMH of CAD here with choledocholithiasis.  -Patient to get ERCP tomorrow  -Will plan operative intervention once risk stratified.  I discussed the plan for a laparoscopic cholecystectomy in next couple of days with the patient

## 2025-04-17 NOTE — H&P ADULT - ASSESSMENT
70yo M w/ PMHx CAD s/p CABG 2023 on Plavix (last dose 6 days prior to arrival) and HLD who presents for post-prandial lower chest/epigastric abdominal pain that first started 2 months ago and has been occurring intermittently since then with acute worsening of the same pain this past Monday admitted for and EGD/ERCP tomorrow 4/18.

## 2025-04-17 NOTE — ED PROVIDER NOTE - PROGRESS NOTE DETAILS
Spoke with GI fellow Dr. Rock asked if he would like any additional advanced imaging on this patient.  Says that they do not require it at this time to admit to medicine and they will take him for ERCP at 9 AM tomorrow morning.  Robert I have assumed care of this patient. I have fully participated in the care of this patient. I have made amendments to the documentation where appropriate and have supervised the ongoing plan of care enacted by the Fellow/Resident/ACP/Student.  STEPAN ENNISP Patient reporting an increase in his epigastric discomfort this is the same character quality and location of the pain that took him to the hospital where he was diagnosed with a CBD stone.  Abdomen now more tender to palpation.  Pain ship requesting pain medication.  Will give a dose of Toradol as well as repeating EKG.  Dr. Damon is at the bedside and aware of the patient's acute complaints.  Robert

## 2025-04-17 NOTE — CONSULT NOTE ADULT - SUBJECTIVE AND OBJECTIVE BOX
SURGERY CONSULT NOTE  --------------------------------------------------------------------------------------------    68yo M w/ PMHx CAD s/p CABG  on Plavix (last dose 6 days prior to arrival) and HLD who presents for post-prandial lower chest/epigastric abdominal pain that first started 2 months ago and has been occurring intermittently since then with acute worsening of the same pain this past  prompting multiple outside hospital and clinic visits in the interim. Patient underwent at CT scan on  at OSH that reveals a 4.8mm distal CBD stone with CBD measuring 8mm above the stone. Evaluated by GI for an EGD/ERCP on     PAST MEDICAL & SURGICAL HISTORY:  CAD (coronary artery disease)        FAMILY HISTORY:    [] Family history not pertinent as reviewed with the patient and family    SOCIAL HISTORY:  ***    ALLERGIES: No Known Allergies      HOME MEDICATIONS:  ***    CURRENT MEDICATIONS  MEDICATIONS (STANDING):   MEDICATIONS (PRN):ketorolac   Injectable 15 milliGRAM(s) IV Push every 8 hours PRN Moderate Pain (4 - 6)    --------------------------------------------------------------------------------------------    Vitals:   T(C): 36.6 (25 @ 17:38), Max: 36.8 (25 @ 15:04)  HR: 52 (25 @ 17:38) (52 - 61)  BP: 198/99 (25 @ 17:38) (128/88 - 212/80)  RR: 18 (25 @ 17:38) (18 - 18)  SpO2: 100% (25 @ 17:38) (99% - 100%)  CAPILLARY BLOOD GLUCOSE        CAPILLARY BLOOD GLUCOSE          Height (cm): 175.3 ( @ 13:21)  Weight (kg): 95.3 ( @ :21)  BMI (kg/m2): 31 ( 13:21)  BSA (m2): 2.11 ( 13:21)    PHYSICAL EXAM: ***  General: Alert, NAD  Neuro: A+Ox3  HEENT: NC/AT, no asymmetry, no scleral icterus  Neck: Soft, supple  Cardio: RRR, nml S1/S2  Resp: Airway patent, unlabored breathing  Thorax: No chest wall tenderness  GI/Abd: Soft, NT/ND, no rebound/guarding, no masses palpated  Vascular: All 4 extremities warm, B/l radial pulses palpable, b/l femoral pulse palpable,  b/l DP/PT palpable  Skin: Intact, no breakdown  Musculoskeletal: All 4 extremities moving spontaneously, no limitations  --------------------------------------------------------------------------------------------    LABS  CBC (:31)                              16.2                           5.09    )----------------(  160        --    % Neutrophils, --    % Lymphocytes, ANC: --                                  46.9      BMP ( 14:31)             138     |  103     |  12    		Ca++ --      Ca 8.9                ---------------------------------( 86    		Mg 2.2                4.1     |  22      |  0.98  			Ph --        LFTs ( 14:31)      TPro 7.2 / Alb 4.4 / TBili 0.8 / DBili -- / AST 43[H] / [H] / AlkPhos 113    Coags ( 14:)  aPTT 31.5 / INR 0.99 / PT 11.4        VBG ( @ 14:31)     7.32 / 51 / 24[L] / 26 / -0.8 / 31.7[L]%     Lactate: 1.5    --------------------------------------------------------------------------------------------    MICROBIOLOGY  Urinalysis ( @ 16:22):     Color: Dark Yellow / Appearance: Cloudy[!] / S.027 / pH: 5.0 / Gluc: Negative / Ketones: Trace[!] / Bili: Small[!] / Urobili: 1.0 / Protein :Negative / Nitrites: Negative / Leuk.Est: Negative / RBC: 2 / WBC: 1 / Sq Epi:  / Non Sq Epi: 3 / Bacteria Negative         --------------------------------------------------------------------------------------------    IMAGING (OSH)    FINDINGS:    Liver: Normal. No mass.    Gailbladder and biliary ducts: There is hydrops of the gallbladder. There is no gallbladder wall thickening or pericholecystic fluid. There is mild intrahepatic billary dilatation. There is a 4.8 mm stone in the distal common bile duct in the head of the pancreas. No other gallstones are seen. Common bile duct measures 8 mm proximal to the stone.  Pancreas: Normal. No ductal dilation.  Spleen: Normal. No splenomegaly.  Adrenal glands: Normal. No mass.  Kidneys and ureters: Normal. No hydronephrosis.  Stomach and bowel: Unremarkable. No obstruction. No mucosal thickening

## 2025-04-17 NOTE — H&P ADULT - PROBLEM SELECTOR PLAN 1
likely secondary to biliary obstruction   GI help appreciated  NPO post MN for ERCP  no clopidogrel for now

## 2025-04-17 NOTE — H&P ADULT - HISTORY OF PRESENT ILLNESS
70yo M w/ PMHx CAD s/p CABG 2023 on Plavix (last dose 6 days prior to arrival) and HLD who presents for post-prandial lower chest/epigastric abdominal pain that first started 2 months ago and has been occurring intermittently since then with acute worsening of the same pain this past Monday 4/14 prompting multiple outside hospital and clinic visits in the interim. Patient underwent at CT scan on 4/14 at OSH (details below) that reveals a 4.8mm distal CBD stone with CBD measuring 8mm above the stone. On assessment in ER this afternoon patient appears well. Denies n/v/f/c/ap currently. He has been adhering to a low fat diet and limiting his overall PO intake which has helped the pain subside. He has never had an EGD before. He reports last colonoscopy 15 years ago that was normal. Never had issues with anesthesia. He denies any other focus of infection at this time (no dysuria, cough, diarrhea, rash etc.). No family hx of GI malig. No one in family with gallstones. Denies heavy EtOH, never smoker, no drug use. Apart from Plavix which he held 6 days ago patient denies any other AC or antiplt use. Plan for direct admission and EGD/ERCP tomorrow 4/18.

## 2025-04-17 NOTE — ED CLERICAL - BED REQUESTED
17-Apr-2025 15:31 IMPRESSION:  SOB 2/2 Pulmonary edema/Fluid overload improved   Hyponatremia improving   Sick sinus sp PPM   H/O COPD  H/O Seizure disorder  Afib on Eliquis    PLAN:    CNS: Avoid CNS depressants.  c/w AEDs    HEENT: Oral care    PULMONARY:  HOB @ 45 degrees. Aspiration Precautions.     CARDIOVASCULAR: Avoid volume overload.  Continue Present management.  FU with EPS     GI: GI prophylaxis.  Feeding as tolerated     RENAL:  Follow up lytes.  Correct as needed.    INFECTIOUS DISEASE: Follow up cultures. No ABX for now    HEMATOLOGICAL:  DVT prophylaxis c/w Eliquis    ENDOCRINE:  Follow up FS.      MUSCULOSKELETAL: OOB to chair.  PT OT     Thorne: No  Lines: Peripheral IV  Code status: Full code  Disposition: tele   DC planning

## 2025-04-17 NOTE — ED PROVIDER NOTE - CLINICAL SUMMARY MEDICAL DECISION MAKING FREE TEXT BOX
Attending Adrianne: 70 y/o M w/ PMH of CAD s/p CABG, s/p stents on plavix (has not taken for 6 days), HLD presenting w/ abdominal pain. Seen w/ partner. Reports last week had been having abdominal pain, mainly worsened after eating. Was at OSH in [facility] and found to have a CBD stone. Was transferred to [facility] for further management. Stated while there, was told his case was delayed due to no anestheisologist being available so pt and partner decided to leave and be seen at this facility (partner is Ivory RN). Reports spoke w/ GI team already and they are planning on doing an ERCP tomorrow at 9am. Reports mild nausea, but otherwise no significant abd pain at this time. Denies fevers, chills, headache, dizziness, blurred vision, chest pain, cough, shortness of breath, vomiting, diarrhea, constipation, urinary symptoms, MSK pain, rash. Pt overall no acute distress. Head NCAT. Lungs clear. HR bradycardic. Abd nondistended/soft/nontender. No CVA tenderness. Non focal neuro exam. Calm and cooperative. Pt here w/ CBD stone at OSH (reported 4.8mm). Will speak w/ GI to confirm plan. Obtain screening labs, CXR, EKG. Treat symptomatically PRN. Will reassess the need for additional interventions as clinically warranted. Refer to any progress notes for updates on clinical course and as a continuation of this MDM.     I, Dr. Roberto Carlos Silver, independently interpreted the EKG which showed sinus bradycardia at a rate of 54, QTc of 447.

## 2025-04-17 NOTE — ED PROVIDER NOTE - IV ALTEPLASE EXCL REL HIDDEN
show
This was a shared visit with the ERICK. I reviewed and verified the documentation and independently performed the documented:

## 2025-04-17 NOTE — ED PROVIDER NOTE - OBJECTIVE STATEMENT
69-year-old male history of triple bypass on Plavix and Crestor presenting to the emergency department after he was diagnosed with a CBD stone.  Patient endorses starting with abdominal pain about a week ago which progressively got worse.  Was seen and evaluated at a [facility] hospital in [facility] where he was diagnosed with a 4 mm CBD stone.  Was transferred to [facility] however was left in the hallway for a prolonged period of time was not satisfied with his service and decided to leave and come be evaluated by us.  Has already been in contact with GI that is aware that he is here and is scheduled for an ERCP at 9 AM tomorrow.  Currently not endorsing any pain but some nausea no fevers no chills.  No other complaints at this time

## 2025-04-18 ENCOUNTER — RESULT REVIEW (OUTPATIENT)
Age: 70
End: 2025-04-18

## 2025-04-18 DIAGNOSIS — I10 ESSENTIAL (PRIMARY) HYPERTENSION: ICD-10-CM

## 2025-04-18 DIAGNOSIS — K81.0 ACUTE CHOLECYSTITIS: ICD-10-CM

## 2025-04-18 LAB
A1C WITH ESTIMATED AVERAGE GLUCOSE RESULT: 5.5 % — SIGNIFICANT CHANGE UP (ref 4–5.6)
ALBUMIN SERPL ELPH-MCNC: 3.5 G/DL — SIGNIFICANT CHANGE UP (ref 3.3–5)
ALP SERPL-CCNC: 119 U/L — SIGNIFICANT CHANGE UP (ref 40–120)
ALT FLD-CCNC: 144 U/L — HIGH (ref 10–45)
ANION GAP SERPL CALC-SCNC: 12 MMOL/L — SIGNIFICANT CHANGE UP (ref 5–17)
AST SERPL-CCNC: 98 U/L — HIGH (ref 10–40)
BILIRUB SERPL-MCNC: 0.9 MG/DL — SIGNIFICANT CHANGE UP (ref 0.2–1.2)
BLD GP AB SCN SERPL QL: NEGATIVE — SIGNIFICANT CHANGE UP
BUN SERPL-MCNC: 11 MG/DL — SIGNIFICANT CHANGE UP (ref 7–23)
CALCIUM SERPL-MCNC: 8.2 MG/DL — LOW (ref 8.4–10.5)
CHLORIDE SERPL-SCNC: 107 MMOL/L — SIGNIFICANT CHANGE UP (ref 96–108)
CO2 SERPL-SCNC: 21 MMOL/L — LOW (ref 22–31)
CREAT SERPL-MCNC: 0.93 MG/DL — SIGNIFICANT CHANGE UP (ref 0.5–1.3)
EGFR: 89 ML/MIN/1.73M2 — SIGNIFICANT CHANGE UP
EGFR: 89 ML/MIN/1.73M2 — SIGNIFICANT CHANGE UP
ESTIMATED AVERAGE GLUCOSE: 111 MG/DL — SIGNIFICANT CHANGE UP (ref 68–114)
GLUCOSE SERPL-MCNC: 74 MG/DL — SIGNIFICANT CHANGE UP (ref 70–99)
HCT VFR BLD CALC: 39.4 % — SIGNIFICANT CHANGE UP (ref 39–50)
HGB BLD-MCNC: 13.8 G/DL — SIGNIFICANT CHANGE UP (ref 13–17)
INR BLD: 1.01 RATIO — SIGNIFICANT CHANGE UP (ref 0.85–1.16)
MCHC RBC-ENTMCNC: 30.7 PG — SIGNIFICANT CHANGE UP (ref 27–34)
MCHC RBC-ENTMCNC: 35 G/DL — SIGNIFICANT CHANGE UP (ref 32–36)
MCV RBC AUTO: 87.6 FL — SIGNIFICANT CHANGE UP (ref 80–100)
NRBC BLD AUTO-RTO: 0 /100 WBCS — SIGNIFICANT CHANGE UP (ref 0–0)
PLATELET # BLD AUTO: 118 K/UL — LOW (ref 150–400)
POTASSIUM SERPL-MCNC: 3.7 MMOL/L — SIGNIFICANT CHANGE UP (ref 3.5–5.3)
POTASSIUM SERPL-SCNC: 3.7 MMOL/L — SIGNIFICANT CHANGE UP (ref 3.5–5.3)
PROT SERPL-MCNC: 5.7 G/DL — LOW (ref 6–8.3)
PROTHROM AB SERPL-ACNC: 11.5 SEC — SIGNIFICANT CHANGE UP (ref 9.9–13.4)
RBC # BLD: 4.5 M/UL — SIGNIFICANT CHANGE UP (ref 4.2–5.8)
RBC # FLD: 12.6 % — SIGNIFICANT CHANGE UP (ref 10.3–14.5)
RH IG SCN BLD-IMP: POSITIVE — SIGNIFICANT CHANGE UP
SODIUM SERPL-SCNC: 140 MMOL/L — SIGNIFICANT CHANGE UP (ref 135–145)
TSH SERPL-MCNC: 2.49 UIU/ML — SIGNIFICANT CHANGE UP (ref 0.27–4.2)
WBC # BLD: 3.95 K/UL — SIGNIFICANT CHANGE UP (ref 3.8–10.5)
WBC # FLD AUTO: 3.95 K/UL — SIGNIFICANT CHANGE UP (ref 3.8–10.5)

## 2025-04-18 PROCEDURE — 88304 TISSUE EXAM BY PATHOLOGIST: CPT | Mod: 26

## 2025-04-18 PROCEDURE — 88305 TISSUE EXAM BY PATHOLOGIST: CPT | Mod: 26

## 2025-04-18 PROCEDURE — 43239 EGD BIOPSY SINGLE/MULTIPLE: CPT | Mod: GC

## 2025-04-18 PROCEDURE — 43264 ERCP REMOVE DUCT CALCULI: CPT | Mod: GC

## 2025-04-18 PROCEDURE — 47562 LAPAROSCOPIC CHOLECYSTECTOMY: CPT | Mod: GC

## 2025-04-18 PROCEDURE — 43262 ENDO CHOLANGIOPANCREATOGRAPH: CPT | Mod: GC

## 2025-04-18 DEVICE — CATH BLLN EXTRACT DIST GUIDE WIRE 15MM 3LUM: Type: IMPLANTABLE DEVICE | Status: FUNCTIONAL

## 2025-04-18 DEVICE — BLLN EXTRCTR PRO RX-S 9-12MM: Type: IMPLANTABLE DEVICE | Status: FUNCTIONAL

## 2025-04-18 DEVICE — CLIP APPLIER TELEFLEX WECK AUTO 5MM X 35CM: Type: IMPLANTABLE DEVICE | Status: FUNCTIONAL

## 2025-04-18 RX ORDER — SODIUM CHLORIDE 9 G/1000ML
500 INJECTION, SOLUTION INTRAVENOUS ONCE
Refills: 0 | Status: COMPLETED | OUTPATIENT
Start: 2025-04-18 | End: 2025-04-18

## 2025-04-18 RX ORDER — SODIUM CHLORIDE 9 G/1000ML
1000 INJECTION, SOLUTION INTRAVENOUS
Refills: 0 | Status: DISCONTINUED | OUTPATIENT
Start: 2025-04-18 | End: 2025-04-18

## 2025-04-18 RX ORDER — ACETAMINOPHEN 500 MG/5ML
650 LIQUID (ML) ORAL EVERY 6 HOURS
Refills: 0 | Status: DISCONTINUED | OUTPATIENT
Start: 2025-04-18 | End: 2025-04-19

## 2025-04-18 RX ORDER — ONDANSETRON HCL/PF 4 MG/2 ML
4 VIAL (ML) INJECTION ONCE
Refills: 0 | Status: COMPLETED | OUTPATIENT
Start: 2025-04-18 | End: 2025-04-18

## 2025-04-18 RX ORDER — FENTANYL CITRATE-0.9 % NACL/PF 100MCG/2ML
25 SYRINGE (ML) INTRAVENOUS
Refills: 0 | Status: DISCONTINUED | OUTPATIENT
Start: 2025-04-18 | End: 2025-04-18

## 2025-04-18 RX ORDER — OXYCODONE HYDROCHLORIDE 30 MG/1
5 TABLET ORAL EVERY 6 HOURS
Refills: 0 | Status: DISCONTINUED | OUTPATIENT
Start: 2025-04-18 | End: 2025-04-19

## 2025-04-18 RX ORDER — MELATONIN 5 MG
5 TABLET ORAL AT BEDTIME
Refills: 0 | Status: DISCONTINUED | OUTPATIENT
Start: 2025-04-18 | End: 2025-04-23

## 2025-04-18 RX ORDER — OXYCODONE HYDROCHLORIDE 30 MG/1
2.5 TABLET ORAL EVERY 6 HOURS
Refills: 0 | Status: DISCONTINUED | OUTPATIENT
Start: 2025-04-18 | End: 2025-04-19

## 2025-04-18 RX ORDER — INDOMETHACIN 50 MG
100 CAPSULE ORAL ONCE
Refills: 0 | Status: DISCONTINUED | OUTPATIENT
Start: 2025-04-18 | End: 2025-04-18

## 2025-04-18 RX ORDER — SODIUM CHLORIDE 9 G/1000ML
500 INJECTION, SOLUTION INTRAVENOUS
Refills: 0 | Status: DISCONTINUED | OUTPATIENT
Start: 2025-04-18 | End: 2025-04-18

## 2025-04-18 RX ORDER — ONDANSETRON HCL/PF 4 MG/2 ML
4 VIAL (ML) INJECTION ONCE
Refills: 0 | Status: DISCONTINUED | OUTPATIENT
Start: 2025-04-18 | End: 2025-04-18

## 2025-04-18 RX ORDER — ENOXAPARIN SODIUM 100 MG/ML
40 INJECTION SUBCUTANEOUS EVERY 24 HOURS
Refills: 0 | Status: DISCONTINUED | OUTPATIENT
Start: 2025-04-19 | End: 2025-04-23

## 2025-04-18 RX ORDER — FENTANYL CITRATE-0.9 % NACL/PF 100MCG/2ML
50 SYRINGE (ML) INTRAVENOUS
Refills: 0 | Status: DISCONTINUED | OUTPATIENT
Start: 2025-04-18 | End: 2025-04-18

## 2025-04-18 RX ADMIN — OXYCODONE HYDROCHLORIDE 5 MILLIGRAM(S): 30 TABLET ORAL at 16:52

## 2025-04-18 RX ADMIN — Medication 650 MILLIGRAM(S): at 19:06

## 2025-04-18 RX ADMIN — Medication 50 MICROGRAM(S): at 14:05

## 2025-04-18 RX ADMIN — Medication 50 MICROGRAM(S): at 13:51

## 2025-04-18 RX ADMIN — Medication 650 MILLIGRAM(S): at 22:21

## 2025-04-18 RX ADMIN — Medication 650 MILLIGRAM(S): at 22:36

## 2025-04-18 RX ADMIN — Medication 4 MILLIGRAM(S): at 18:36

## 2025-04-18 RX ADMIN — OXYCODONE HYDROCHLORIDE 5 MILLIGRAM(S): 30 TABLET ORAL at 16:22

## 2025-04-18 RX ADMIN — SODIUM CHLORIDE 1000 MILLILITER(S): 9 INJECTION, SOLUTION INTRAVENOUS at 18:35

## 2025-04-18 RX ADMIN — Medication 650 MILLIGRAM(S): at 18:36

## 2025-04-18 RX ADMIN — Medication 160 MILLIGRAM(S): at 13:51

## 2025-04-18 RX ADMIN — Medication 5 MILLIGRAM(S): at 22:21

## 2025-04-18 NOTE — PROGRESS NOTE ADULT - SUBJECTIVE AND OBJECTIVE BOX
Surgery Progress Note    S: Patient seen and examined. No acute events overnight. Reports pain is better today.    O:  Physical Exam:  Gen: Laying in bed, NAD  Resp: Unlabored breathing  Abd: soft, non-tender, non-distended  Ext: Moves 4 extremities spontaneously    Vital Signs Last 24 Hrs  T(C): 36.1 (18 Apr 2025 09:25), Max: 36.8 (17 Apr 2025 15:04)  T(F): 97 (18 Apr 2025 09:25), Max: 98.3 (17 Apr 2025 15:04)  HR: 51 (18 Apr 2025 09:25) (50 - 61)  BP: 171/78 (18 Apr 2025 09:25) (128/88 - 212/80)  BP(mean): --  RR: 10 (18 Apr 2025 09:25) (10 - 18)  SpO2: 97% (18 Apr 2025 09:25) (97% - 100%)    Parameters below as of 18 Apr 2025 09:25  Patient On (Oxygen Delivery Method): room air        I&O's Detail                            13.8   3.95  )-----------( 118      ( 18 Apr 2025 06:59 )             39.4       04-18    140  |  107  |  11  ----------------------------<  74  3.7   |  21[L]  |  0.93    Ca    8.2[L]      18 Apr 2025 07:00  Mg     2.2     04-17    TPro  5.7[L]  /  Alb  3.5  /  TBili  0.9  /  DBili  x   /  AST  98[H]  /  ALT  144[H]  /  AlkPhos  119  04-18

## 2025-04-18 NOTE — PROGRESS NOTE ADULT - ASSESSMENT
Chronic condition.  Patient presently taking vitamin D supplementation.  Lab test requested for follow-up       68yo M w/ PMHx CAD s/p CABG 2023 on Plavix (last dose 6 days prior to arrival) with OSH scan s/o choledocholithiasis w/o cholecystitis. Planned for EGD/ ERCP with GI on 4/18.    Plan:  - ERCP with GI this AM  - Scheduled for laparoscopic cholecystectomy today after ERCP  - Keep NPO  - Pre-op labs obtained  - Signed consent in chart    Trauma -1963

## 2025-04-18 NOTE — PROGRESS NOTE ADULT - SUBJECTIVE AND OBJECTIVE BOX
Patient is a 69y old  Male who presents with a chief complaint of for ERCP (18 Apr 2025 09:37)      DATE OF SERVICE: 04-18-25 @ 14:39    SUBJECTIVE / OVERNIGHT EVENTS: overnight events noted    ROS:  Resp: No cough no sputum production  CVS: No chest pain no palpitations no orthopnea  GI: no N/V/D  seen in PACU  no specific c/o pain      MEDICATIONS  (STANDING):  acetaminophen     Tablet .. 650 milliGRAM(s) Oral every 6 hours  melatonin 5 milliGRAM(s) Oral at bedtime  valsartan 160 milliGRAM(s) Oral daily    MEDICATIONS  (PRN):  fentaNYL    Injectable 25 MICROGram(s) IV Push every 5 minutes PRN Moderate Pain (4 - 6)  fentaNYL    Injectable 50 MICROGram(s) IV Push every 5 minutes PRN Severe Pain (7 - 10)  hydrALAZINE Injectable 10 milliGRAM(s) IV Push every 6 hours PRN for SBP>180  ondansetron Injectable 4 milliGRAM(s) IV Push once PRN Nausea and/or Vomiting  oxyCODONE    IR 5 milliGRAM(s) Oral every 6 hours PRN Severe Pain (7 - 10)  oxyCODONE    IR 2.5 milliGRAM(s) Oral every 6 hours PRN Moderate Pain (4 - 6)        CAPILLARY BLOOD GLUCOSE        I&O's Summary    18 Apr 2025 07:01  -  18 Apr 2025 14:39  --------------------------------------------------------  IN: 120 mL / OUT: 400 mL / NET: -280 mL        Vital Signs Last 24 Hrs  T(C): 36.6 (18 Apr 2025 13:00), Max: 36.8 (17 Apr 2025 15:04)  T(F): 97.9 (18 Apr 2025 13:00), Max: 98.3 (17 Apr 2025 15:04)  HR: 58 (18 Apr 2025 14:15) (50 - 58)  BP: 169/79 (18 Apr 2025 14:15) (128/88 - 212/80)  BP(mean): 114 (18 Apr 2025 14:15) (112 - 139)  RR: 17 (18 Apr 2025 14:15) (10 - 18)  SpO2: 96% (18 Apr 2025 14:15) (94% - 100%)    PHYSICAL EXAM:  CHEST/LUNG: clear  HEART: S1 S2; no murmurs   ABDOMEN: Soft, Nontender  EXTREMITIES: no edema  NEUROLOGY: AO x 3 non-focal  SKIN: No rashes or lesions    LABS:                        13.8   3.95  )-----------( 118      ( 18 Apr 2025 06:59 )             39.4     04-18    140  |  107  |  11  ----------------------------<  74  3.7   |  21[L]  |  0.93    Ca    8.2[L]      18 Apr 2025 07:00  Mg     2.2     04-17    TPro  5.7[L]  /  Alb  3.5  /  TBili  0.9  /  DBili  x   /  AST  98[H]  /  ALT  144[H]  /  AlkPhos  119  04-18    PT/INR - ( 18 Apr 2025 06:59 )   PT: 11.5 sec;   INR: 1.01 ratio         PTT - ( 17 Apr 2025 14:31 )  PTT:31.5 sec      Urinalysis Basic - ( 18 Apr 2025 07:00 )    Color: x / Appearance: x / SG: x / pH: x  Gluc: 74 mg/dL / Ketone: x  / Bili: x / Urobili: x   Blood: x / Protein: x / Nitrite: x   Leuk Esterase: x / RBC: x / WBC x   Sq Epi: x / Non Sq Epi: x / Bacteria: x          All consultant(s) notes reviewed and care discussed with other providers        Contact Number, Dr Damon [phone]

## 2025-04-18 NOTE — SBIRT NOTE ADULT - NSSBIRTUNABLESCROTHER_GEN_A_CORE
Social work consulted via [employer] for "SBIRT". Chart reviewed and appreciated. As per chart, patient is within healthy drinking guidelines. No social work concerns at this time.

## 2025-04-18 NOTE — BRIEF OPERATIVE NOTE - OPERATION/FINDINGS
Inflamed gallbladder with fat adherent to the lower wall. Gallbladder decompressed with evacuation of dark bile. Critical view obtained. Cystic artery and duct clipped with 5mm hemolocks. Gallbladder entered with spillage of bile, abdomen irrigated prior to closure.

## 2025-04-19 LAB
ADD ON TEST-SPECIMEN IN LAB: SIGNIFICANT CHANGE UP
ALBUMIN SERPL ELPH-MCNC: 3.3 G/DL — SIGNIFICANT CHANGE UP (ref 3.3–5)
ALBUMIN SERPL ELPH-MCNC: 3.6 G/DL — SIGNIFICANT CHANGE UP (ref 3.3–5)
ALP SERPL-CCNC: 86 U/L — SIGNIFICANT CHANGE UP (ref 40–120)
ALP SERPL-CCNC: 95 U/L — SIGNIFICANT CHANGE UP (ref 40–120)
ALT FLD-CCNC: 105 U/L — HIGH (ref 10–45)
ALT FLD-CCNC: 94 U/L — HIGH (ref 10–45)
AMYLASE P1 CFR SERPL: 65 U/L — SIGNIFICANT CHANGE UP (ref 25–125)
ANION GAP SERPL CALC-SCNC: 11 MMOL/L — SIGNIFICANT CHANGE UP (ref 5–17)
ANION GAP SERPL CALC-SCNC: 13 MMOL/L — SIGNIFICANT CHANGE UP (ref 5–17)
AST SERPL-CCNC: 36 U/L — SIGNIFICANT CHANGE UP (ref 10–40)
AST SERPL-CCNC: 44 U/L — HIGH (ref 10–40)
BILIRUB SERPL-MCNC: 0.6 MG/DL — SIGNIFICANT CHANGE UP (ref 0.2–1.2)
BILIRUB SERPL-MCNC: 0.6 MG/DL — SIGNIFICANT CHANGE UP (ref 0.2–1.2)
BUN SERPL-MCNC: 14 MG/DL — SIGNIFICANT CHANGE UP (ref 7–23)
BUN SERPL-MCNC: 14 MG/DL — SIGNIFICANT CHANGE UP (ref 7–23)
CALCIUM SERPL-MCNC: 8.2 MG/DL — LOW (ref 8.4–10.5)
CALCIUM SERPL-MCNC: 8.2 MG/DL — LOW (ref 8.4–10.5)
CHLORIDE SERPL-SCNC: 101 MMOL/L — SIGNIFICANT CHANGE UP (ref 96–108)
CHLORIDE SERPL-SCNC: 102 MMOL/L — SIGNIFICANT CHANGE UP (ref 96–108)
CK MB BLD-MCNC: 4.6 % — HIGH (ref 0–3.5)
CK MB CFR SERPL CALC: 2.1 NG/ML — SIGNIFICANT CHANGE UP (ref 0–6.7)
CK SERPL-CCNC: 46 U/L — SIGNIFICANT CHANGE UP (ref 30–200)
CO2 SERPL-SCNC: 19 MMOL/L — LOW (ref 22–31)
CO2 SERPL-SCNC: 22 MMOL/L — SIGNIFICANT CHANGE UP (ref 22–31)
CREAT SERPL-MCNC: 0.97 MG/DL — SIGNIFICANT CHANGE UP (ref 0.5–1.3)
CREAT SERPL-MCNC: 1.03 MG/DL — SIGNIFICANT CHANGE UP (ref 0.5–1.3)
EGFR: 79 ML/MIN/1.73M2 — SIGNIFICANT CHANGE UP
EGFR: 79 ML/MIN/1.73M2 — SIGNIFICANT CHANGE UP
EGFR: 85 ML/MIN/1.73M2 — SIGNIFICANT CHANGE UP
EGFR: 85 ML/MIN/1.73M2 — SIGNIFICANT CHANGE UP
GLUCOSE SERPL-MCNC: 162 MG/DL — HIGH (ref 70–99)
GLUCOSE SERPL-MCNC: 185 MG/DL — HIGH (ref 70–99)
HCT VFR BLD CALC: 33.3 % — LOW (ref 39–50)
HCT VFR BLD CALC: 34.6 % — LOW (ref 39–50)
HGB BLD-MCNC: 11.4 G/DL — LOW (ref 13–17)
HGB BLD-MCNC: 12.2 G/DL — LOW (ref 13–17)
LIDOCAIN IGE QN: 27 U/L — SIGNIFICANT CHANGE UP (ref 7–60)
LIDOCAIN IGE QN: 35 U/L — SIGNIFICANT CHANGE UP (ref 7–60)
MAGNESIUM SERPL-MCNC: 2 MG/DL — SIGNIFICANT CHANGE UP (ref 1.6–2.6)
MAGNESIUM SERPL-MCNC: 2.1 MG/DL — SIGNIFICANT CHANGE UP (ref 1.6–2.6)
MCHC RBC-ENTMCNC: 29.9 PG — SIGNIFICANT CHANGE UP (ref 27–34)
MCHC RBC-ENTMCNC: 30.3 PG — SIGNIFICANT CHANGE UP (ref 27–34)
MCHC RBC-ENTMCNC: 34.2 G/DL — SIGNIFICANT CHANGE UP (ref 32–36)
MCHC RBC-ENTMCNC: 35.3 G/DL — SIGNIFICANT CHANGE UP (ref 32–36)
MCV RBC AUTO: 85.9 FL — SIGNIFICANT CHANGE UP (ref 80–100)
MCV RBC AUTO: 87.4 FL — SIGNIFICANT CHANGE UP (ref 80–100)
NRBC BLD AUTO-RTO: 0 /100 WBCS — SIGNIFICANT CHANGE UP (ref 0–0)
NRBC BLD AUTO-RTO: 0 /100 WBCS — SIGNIFICANT CHANGE UP (ref 0–0)
PHOSPHATE SERPL-MCNC: 2.3 MG/DL — LOW (ref 2.5–4.5)
PHOSPHATE SERPL-MCNC: 2.6 MG/DL — SIGNIFICANT CHANGE UP (ref 2.5–4.5)
PLATELET # BLD AUTO: 154 K/UL — SIGNIFICANT CHANGE UP (ref 150–400)
PLATELET # BLD AUTO: 159 K/UL — SIGNIFICANT CHANGE UP (ref 150–400)
POTASSIUM SERPL-MCNC: 3.8 MMOL/L — SIGNIFICANT CHANGE UP (ref 3.5–5.3)
POTASSIUM SERPL-MCNC: 4.2 MMOL/L — SIGNIFICANT CHANGE UP (ref 3.5–5.3)
POTASSIUM SERPL-SCNC: 3.8 MMOL/L — SIGNIFICANT CHANGE UP (ref 3.5–5.3)
POTASSIUM SERPL-SCNC: 4.2 MMOL/L — SIGNIFICANT CHANGE UP (ref 3.5–5.3)
PROT SERPL-MCNC: 5.4 G/DL — LOW (ref 6–8.3)
PROT SERPL-MCNC: 5.6 G/DL — LOW (ref 6–8.3)
RBC # BLD: 3.81 M/UL — LOW (ref 4.2–5.8)
RBC # BLD: 4.03 M/UL — LOW (ref 4.2–5.8)
RBC # FLD: 12.4 % — SIGNIFICANT CHANGE UP (ref 10.3–14.5)
RBC # FLD: 12.6 % — SIGNIFICANT CHANGE UP (ref 10.3–14.5)
SODIUM SERPL-SCNC: 133 MMOL/L — LOW (ref 135–145)
SODIUM SERPL-SCNC: 135 MMOL/L — SIGNIFICANT CHANGE UP (ref 135–145)
TROPONIN T, HIGH SENSITIVITY RESULT: 12 NG/L — SIGNIFICANT CHANGE UP (ref 0–51)
WBC # BLD: 9.17 K/UL — SIGNIFICANT CHANGE UP (ref 3.8–10.5)
WBC # BLD: 9.82 K/UL — SIGNIFICANT CHANGE UP (ref 3.8–10.5)
WBC # FLD AUTO: 9.17 K/UL — SIGNIFICANT CHANGE UP (ref 3.8–10.5)
WBC # FLD AUTO: 9.82 K/UL — SIGNIFICANT CHANGE UP (ref 3.8–10.5)

## 2025-04-19 PROCEDURE — 99232 SBSQ HOSP IP/OBS MODERATE 35: CPT | Mod: GC

## 2025-04-19 PROCEDURE — 93010 ELECTROCARDIOGRAM REPORT: CPT

## 2025-04-19 PROCEDURE — 99024 POSTOP FOLLOW-UP VISIT: CPT

## 2025-04-19 PROCEDURE — 93010 ELECTROCARDIOGRAM REPORT: CPT | Mod: 77

## 2025-04-19 RX ORDER — ACETAMINOPHEN 500 MG/5ML
1000 LIQUID (ML) ORAL EVERY 6 HOURS
Refills: 0 | Status: DISCONTINUED | OUTPATIENT
Start: 2025-04-19 | End: 2025-04-21

## 2025-04-19 RX ORDER — LIDOCAINE HYDROCHLORIDE 20 MG/ML
1 JELLY TOPICAL EVERY 24 HOURS
Refills: 0 | Status: DISCONTINUED | OUTPATIENT
Start: 2025-04-19 | End: 2025-04-23

## 2025-04-19 RX ORDER — OXYCODONE HYDROCHLORIDE 30 MG/1
5 TABLET ORAL EVERY 4 HOURS
Refills: 0 | Status: DISCONTINUED | OUTPATIENT
Start: 2025-04-19 | End: 2025-04-22

## 2025-04-19 RX ORDER — LIDOCAINE HYDROCHLORIDE 20 MG/ML
1 JELLY TOPICAL EVERY 24 HOURS
Refills: 0 | Status: DISCONTINUED | OUTPATIENT
Start: 2025-04-19 | End: 2025-04-19

## 2025-04-19 RX ORDER — HYDROMORPHONE/SOD CHLOR,ISO/PF 2 MG/10 ML
0.5 SYRINGE (ML) INJECTION EVERY 4 HOURS
Refills: 0 | Status: DISCONTINUED | OUTPATIENT
Start: 2025-04-19 | End: 2025-04-21

## 2025-04-19 RX ORDER — SODIUM CHLORIDE 9 G/1000ML
500 INJECTION, SOLUTION INTRAVENOUS ONCE
Refills: 0 | Status: COMPLETED | OUTPATIENT
Start: 2025-04-19 | End: 2025-04-19

## 2025-04-19 RX ORDER — HYDROMORPHONE/SOD CHLOR,ISO/PF 2 MG/10 ML
1 SYRINGE (ML) INJECTION ONCE
Refills: 0 | Status: DISCONTINUED | OUTPATIENT
Start: 2025-04-19 | End: 2025-04-19

## 2025-04-19 RX ORDER — OXYCODONE HYDROCHLORIDE 30 MG/1
10 TABLET ORAL EVERY 4 HOURS
Refills: 0 | Status: DISCONTINUED | OUTPATIENT
Start: 2025-04-19 | End: 2025-04-22

## 2025-04-19 RX ORDER — ACETAMINOPHEN 500 MG/5ML
325 LIQUID (ML) ORAL ONCE
Refills: 0 | Status: COMPLETED | OUTPATIENT
Start: 2025-04-19 | End: 2025-04-19

## 2025-04-19 RX ORDER — SODIUM PHOSPHATE,DIBASIC DIHYD
15 POWDER (GRAM) MISCELLANEOUS ONCE
Refills: 0 | Status: COMPLETED | OUTPATIENT
Start: 2025-04-19 | End: 2025-04-19

## 2025-04-19 RX ADMIN — OXYCODONE HYDROCHLORIDE 10 MILLIGRAM(S): 30 TABLET ORAL at 17:40

## 2025-04-19 RX ADMIN — OXYCODONE HYDROCHLORIDE 5 MILLIGRAM(S): 30 TABLET ORAL at 09:45

## 2025-04-19 RX ADMIN — Medication 1000 MILLIGRAM(S): at 18:10

## 2025-04-19 RX ADMIN — Medication 325 MILLIGRAM(S): at 03:23

## 2025-04-19 RX ADMIN — Medication 325 MILLIGRAM(S): at 04:42

## 2025-04-19 RX ADMIN — Medication 5 MILLIGRAM(S): at 22:23

## 2025-04-19 RX ADMIN — Medication 1000 MILLIGRAM(S): at 22:27

## 2025-04-19 RX ADMIN — Medication 1 MILLIGRAM(S): at 12:29

## 2025-04-19 RX ADMIN — Medication 63.75 MILLIMOLE(S): at 02:08

## 2025-04-19 RX ADMIN — Medication 1000 MILLIGRAM(S): at 22:23

## 2025-04-19 RX ADMIN — Medication 650 MILLIGRAM(S): at 11:36

## 2025-04-19 RX ADMIN — Medication 1 MILLIGRAM(S): at 09:45

## 2025-04-19 RX ADMIN — OXYCODONE HYDROCHLORIDE 5 MILLIGRAM(S): 30 TABLET ORAL at 08:38

## 2025-04-19 RX ADMIN — Medication 1 MILLIGRAM(S): at 12:59

## 2025-04-19 RX ADMIN — LIDOCAINE HYDROCHLORIDE 1 PATCH: 20 JELLY TOPICAL at 19:28

## 2025-04-19 RX ADMIN — OXYCODONE HYDROCHLORIDE 10 MILLIGRAM(S): 30 TABLET ORAL at 18:10

## 2025-04-19 RX ADMIN — Medication 650 MILLIGRAM(S): at 05:06

## 2025-04-19 RX ADMIN — ENOXAPARIN SODIUM 40 MILLIGRAM(S): 100 INJECTION SUBCUTANEOUS at 05:05

## 2025-04-19 RX ADMIN — SODIUM CHLORIDE 1000 MILLILITER(S): 9 INJECTION, SOLUTION INTRAVENOUS at 02:06

## 2025-04-19 RX ADMIN — LIDOCAINE HYDROCHLORIDE 1 PATCH: 20 JELLY TOPICAL at 17:41

## 2025-04-19 RX ADMIN — Medication 1 MILLIGRAM(S): at 09:15

## 2025-04-19 RX ADMIN — Medication 1000 MILLIGRAM(S): at 17:40

## 2025-04-19 RX ADMIN — Medication 650 MILLIGRAM(S): at 11:06

## 2025-04-19 NOTE — PROVIDER CONTACT NOTE (OTHER) - ASSESSMENT
Pt states he has severe 10 out of 10 upper abdominal pain radiating to the right shoulder. Pt states it is difficult to breathe and pain is worse with movement. Oxycodone, Tylenol, and two doses of 1mg of IVP Dilaudid administered to patient. Pt is screaming and gasping in pain often.

## 2025-04-19 NOTE — PROGRESS NOTE ADULT - ASSESSMENT
70yo M w/ PMHx CAD s/p CABG 2023 on Plavix (last dose 6 days prior to arrival) and HLD who presents for post-prandial lower chest/epigastric abdominal pain that first started 2 months ago and has been occurring intermittently since then with acute worsening of the same pain this past Monday 4/14 prompting multiple outside hospital and clinic visits in the interim. Patient underwent at CT scan on 4/14 at OSH (details below) that reveals a 4.8mm distal CBD stone with CBD measuring 8mm above the stone.    #Abdominal Pain  #Choledocholithiasis with CBD Dilation s/p sphincterotomy and cholcyestctomy  #CABG on Plavix (last dose 6 days prior to arrival)    P/w choledocholithiasis on imaging from OSH 4/14 (detailed above) with low suspicion for cholangitis. s/p ERCP 4/18 showing erosive gastritis and choledocholithiasis s/p complete removal with sphincterotomy and balloon dilation. After ERCP, pt went directly to OR for lap jac. Now with acute onset of severe abdominal pain and distension raises c/f post ERCP pancreatitis. Perforation also on ddx    Recommendations:  - Obtain stat CTAP to r/o perforation  - Send lipase   - Trend daily CBC, CMP  - Monitor vitals closely  - Rest of plan per surgery team    All recommendations are tentative until note is attested by attending.     Chiquita Cuadra, PGY4  Gastroenterology/Hepatology Fellow  Available on [employer] Teams  [phone] (Long Range Pager)  [phone] (Short Range Pager [facility])    After 5 pm, please contact the on-call GI fellow for any urgent issues via the Hospital Call

## 2025-04-19 NOTE — PROGRESS NOTE ADULT - SUBJECTIVE AND OBJECTIVE BOX
Trauma Surgery Progress Note    SUBJECTIVE  The patient was seen and examined. No acute events overnight. Afebrile w/ stable vitals. Complains of severe right shoulder pain.      OBJECTIVE  ___________________________________________________  VITAL SIGNS / I&O's   Vital Signs Last 24 Hrs  T(C): 36.4 (19 Apr 2025 08:43), Max: 36.8 (18 Apr 2025 17:10)  T(F): 97.6 (19 Apr 2025 08:43), Max: 98.3 (18 Apr 2025 17:10)  HR: 70 (19 Apr 2025 08:43) (49 - 88)  BP: 103/66 (19 Apr 2025 08:43) (90/58 - 194/97)  BP(mean): 92 (18 Apr 2025 15:57) (91 - 139)  RR: 18 (19 Apr 2025 08:43) (12 - 18)  SpO2: 96% (19 Apr 2025 08:43) (93% - 100%)    Parameters below as of 19 Apr 2025 08:43  Patient On (Oxygen Delivery Method): room air          18 Apr 2025 07:01  -  19 Apr 2025 07:00  --------------------------------------------------------  IN:    Oral Fluid: 600 mL  Total IN: 600 mL    OUT:    Voided (mL): 2050 mL  Total OUT: 2050 mL    Total NET: -1450 mL        ___________________________________________________  PHYSICAL EXAM    General: NAD, resting comfortably in bed  Pulmonary: Nonlabored breathing, no respiratory distress  Cardiovascular: Regular Rate  Abdominal: soft, appropriately tender at incision sites, opsites without strikethrough, non-distended   Extremities: Non edematous       ___________________________________________________  LABS                        11.4   9.17  )-----------( 154      ( 19 Apr 2025 08:48 )             33.3     19 Apr 2025 08:48    135    |  102    |  14     ----------------------------<  162    3.8     |  22     |  1.03     Ca    8.2        19 Apr 2025 08:48  Phos  2.6       19 Apr 2025 08:48  Mg     2.1       19 Apr 2025 08:48    TPro  5.4    /  Alb  3.3    /  TBili  0.6    /  DBili  x      /  AST  36     /  ALT  94     /  AlkPhos  86     19 Apr 2025 08:48    PT/INR - ( 18 Apr 2025 06:59 )   PT: 11.5 sec;   INR: 1.01 ratio         PTT - ( 17 Apr 2025 14:31 )  PTT:31.5 sec  CAPILLARY BLOOD GLUCOSE        CARDIAC MARKERS ( 19 Apr 2025 01:20 )  x     / x     / x     / x     / 2.1 ng/mL      Urinalysis Basic - ( 19 Apr 2025 08:48 )    Color: x / Appearance: x / SG: x / pH: x  Gluc: 162 mg/dL / Ketone: x  / Bili: x / Urobili: x   Blood: x / Protein: x / Nitrite: x   Leuk Esterase: x / RBC: x / WBC x   Sq Epi: x / Non Sq Epi: x / Bacteria: x      ___________________________________________________  MICRO  Recent Cultures:    ___________________________________________________  MEDICATIONS  (STANDING):  acetaminophen     Tablet .. 1000 milliGRAM(s) Oral every 6 hours  enoxaparin Injectable 40 milliGRAM(s) SubCutaneous every 24 hours  lidocaine   4% Patch 1 Patch Transdermal every 24 hours  melatonin 5 milliGRAM(s) Oral at bedtime    MEDICATIONS  (PRN):  hydrALAZINE Injectable 10 milliGRAM(s) IV Push every 6 hours PRN for SBP>180  HYDROmorphone  Injectable 0.5 milliGRAM(s) IV Push every 4 hours PRN Severe Pain (7 - 10)  oxyCODONE    IR 5 milliGRAM(s) Oral every 4 hours PRN Moderate Pain (4 - 6)  oxyCODONE    IR 10 milliGRAM(s) Oral every 4 hours PRN Severe Pain (7 - 10)

## 2025-04-19 NOTE — PROGRESS NOTE ADULT - SUBJECTIVE AND OBJECTIVE BOX
Interval Events:   s/p ERCP and cholecystectomy yesterday  This morning, pt had acute onset of abdominal pain and distension    ROS:   12 point review of systems performed and negative except otherwise noted in HPI.    Hospital Medications:  acetaminophen     Tablet .. 1000 milliGRAM(s) Oral every 6 hours  enoxaparin Injectable 40 milliGRAM(s) SubCutaneous every 24 hours  hydrALAZINE Injectable 10 milliGRAM(s) IV Push every 6 hours PRN  HYDROmorphone  Injectable 0.5 milliGRAM(s) IV Push every 4 hours PRN  lidocaine   4% Patch 1 Patch Transdermal every 24 hours  melatonin 5 milliGRAM(s) Oral at bedtime  oxyCODONE    IR 5 milliGRAM(s) Oral every 4 hours PRN  oxyCODONE    IR 10 milliGRAM(s) Oral every 4 hours PRN      PHYSICAL EXAM:   Vital Signs:  Vital Signs Last 24 Hrs  T(C): 36.4 (19 Apr 2025 08:43), Max: 36.8 (18 Apr 2025 17:10)  T(F): 97.6 (19 Apr 2025 08:43), Max: 98.3 (18 Apr 2025 17:10)  HR: 70 (19 Apr 2025 08:43) (49 - 88)  BP: 103/66 (19 Apr 2025 08:43) (90/58 - 179/90)  BP(mean): 92 (18 Apr 2025 15:57) (91 - 128)  RR: 18 (19 Apr 2025 08:43) (16 - 18)  SpO2: 96% (19 Apr 2025 08:43) (93% - 100%)    Parameters below as of 19 Apr 2025 08:43  Patient On (Oxygen Delivery Method): room air      Daily     Daily     GENERAL: uncomfortable appearing  NEURO: alert  HEENT: NCAT, no conjunctival pallor appreciated  CHEST: no respiratory distress, no accessory muscle use  CARDIAC: regular rate, +S1/S2  ABDOMEN: soft, significantly tender, distended no rebound or guarding  EXTREMITIES: warm, well perfused  SKIN: no lesions noted    LABS: reviewed                        11.4   9.17  )-----------( 154      ( 19 Apr 2025 08:48 )             33.3     04-19    135  |  102  |  14  ----------------------------<  162[H]  3.8   |  22  |  1.03    Ca    8.2[L]      19 Apr 2025 08:48  Phos  2.6     04-19  Mg     2.1     04-19    TPro  5.4[L]  /  Alb  3.3  /  TBili  0.6  /  DBili  x   /  AST  36  /  ALT  94[H]  /  AlkPhos  86  04-19    LIVER FUNCTIONS - ( 19 Apr 2025 08:48 )  Alb: 3.3 g/dL / Pro: 5.4 g/dL / ALK PHOS: 86 U/L / ALT: 94 U/L / AST: 36 U/L / GGT: x             Interval Diagnostic Studies: see sunrise for full report

## 2025-04-19 NOTE — PROGRESS NOTE ADULT - ASSESSMENT
68yo M w/ PMHx CAD s/p CABG 2023 on Plavix (last dose 6 days prior to arrival) with OSH scan s/o choledocholithiasis w/o cholecystitis s/p for EGD/ ERCP with GI on 4/18 and laparoscopic cholecystectomy on 4/18. Severe R shoulder pain this AM likely referred pain from diaphragm from insufflation, remains HDS.     Plan:  - Pain control as needed  - CXR wnl  - DVT ppx  - Regular diet  - OOB and ambulating as tolerated  - F/u AM labs  - hold Plavix 5d    Trauma -1311.

## 2025-04-19 NOTE — PROGRESS NOTE ADULT - SUBJECTIVE AND OBJECTIVE BOX
Patient is a 69y old  Male who presents with a chief complaint of for ERCP (19 Apr 2025 11:32)      DATE OF SERVICE: 04-19-25 @ 16:25    SUBJECTIVE / OVERNIGHT EVENTS: overnight events noted    ROS:  Resp: No cough no sputum production  CVS: No chest pain no palpitations no orthopnea  GI: no N/V/D  c/oo severe pain right shoulder 8 - 10/10    MEDICATIONS  (STANDING):  acetaminophen     Tablet .. 1000 milliGRAM(s) Oral every 6 hours  enoxaparin Injectable 40 milliGRAM(s) SubCutaneous every 24 hours  lidocaine   4% Patch 1 Patch Transdermal every 24 hours  melatonin 5 milliGRAM(s) Oral at bedtime    MEDICATIONS  (PRN):  hydrALAZINE Injectable 10 milliGRAM(s) IV Push every 6 hours PRN for SBP>180  HYDROmorphone  Injectable 0.5 milliGRAM(s) IV Push every 4 hours PRN Severe Pain (7 - 10)  oxyCODONE    IR 5 milliGRAM(s) Oral every 4 hours PRN Moderate Pain (4 - 6)  oxyCODONE    IR 10 milliGRAM(s) Oral every 4 hours PRN Severe Pain (7 - 10)        CAPILLARY BLOOD GLUCOSE        I&O's Summary    18 Apr 2025 07:01  -  19 Apr 2025 07:00  --------------------------------------------------------  IN: 600 mL / OUT: 2050 mL / NET: -1450 mL        Vital Signs Last 24 Hrs  T(C): 37.1 (19 Apr 2025 15:55), Max: 37.1 (19 Apr 2025 15:55)  T(F): 98.8 (19 Apr 2025 15:55), Max: 98.8 (19 Apr 2025 15:55)  HR: 82 (19 Apr 2025 15:55) (62 - 88)  BP: 107/72 (19 Apr 2025 15:55) (90/58 - 117/68)  BP(mean): --  RR: 18 (19 Apr 2025 15:55) (18 - 18)  SpO2: 91% (19 Apr 2025 15:55) (91% - 98%)    PHYSICAL EXAM:  NECK: Supple, No JVD  CHEST/LUNG: clear   HEART: S1 S2; no murmurs   ABDOMEN: Soft, Nontender  EXTREMITIES:    no edema  NEUROLOGY: AO x 3 non-focal  SKIN: No rashes or lesions  movements at right shoulder free and not limited by pain    LABS:                        11.4   9.17  )-----------( 154      ( 19 Apr 2025 08:48 )             33.3     04-19    135  |  102  |  14  ----------------------------<  162[H]  3.8   |  22  |  1.03    Ca    8.2[L]      19 Apr 2025 08:48  Phos  2.6     04-19  Mg     2.1     04-19    TPro  5.4[L]  /  Alb  3.3  /  TBili  0.6  /  DBili  x   /  AST  36  /  ALT  94[H]  /  AlkPhos  86  04-19    PT/INR - ( 18 Apr 2025 06:59 )   PT: 11.5 sec;   INR: 1.01 ratio           CARDIAC MARKERS ( 19 Apr 2025 01:20 )  x     / x     / x     / x     / 2.1 ng/mL      Urinalysis Basic - ( 19 Apr 2025 08:48 )    Color: x / Appearance: x / SG: x / pH: x  Gluc: 162 mg/dL / Ketone: x  / Bili: x / Urobili: x   Blood: x / Protein: x / Nitrite: x   Leuk Esterase: x / RBC: x / WBC x   Sq Epi: x / Non Sq Epi: x / Bacteria: x          All consultant(s) notes reviewed and care discussed with other providers        Contact Number, Dr Damon [phone]

## 2025-04-19 NOTE — PROGRESS NOTE ADULT - NSPROGADDITIONALINFOA_GEN_ALL_CORE
discussed with patient, expresses understanding of treatment plans.  discussed with surgery  transferred to surgery service  I will follow
discussed with patient, expresses understanding of treatment plans.  discussed with RN  discussed with surgery attending

## 2025-04-20 LAB
ALBUMIN SERPL ELPH-MCNC: 4 G/DL — SIGNIFICANT CHANGE UP (ref 3.3–5)
ALP SERPL-CCNC: 90 U/L — SIGNIFICANT CHANGE UP (ref 40–120)
ALT FLD-CCNC: 84 U/L — HIGH (ref 10–45)
ANION GAP SERPL CALC-SCNC: 16 MMOL/L — SIGNIFICANT CHANGE UP (ref 5–17)
AST SERPL-CCNC: 26 U/L — SIGNIFICANT CHANGE UP (ref 10–40)
BILIRUB DIRECT SERPL-MCNC: 0.4 MG/DL — HIGH (ref 0–0.3)
BILIRUB INDIRECT FLD-MCNC: 0.8 MG/DL — SIGNIFICANT CHANGE UP (ref 0.2–1)
BILIRUB SERPL-MCNC: 1.2 MG/DL — SIGNIFICANT CHANGE UP (ref 0.2–1.2)
BUN SERPL-MCNC: 14 MG/DL — SIGNIFICANT CHANGE UP (ref 7–23)
CALCIUM SERPL-MCNC: 8.6 MG/DL — SIGNIFICANT CHANGE UP (ref 8.4–10.5)
CHLORIDE SERPL-SCNC: 102 MMOL/L — SIGNIFICANT CHANGE UP (ref 96–108)
CO2 SERPL-SCNC: 20 MMOL/L — LOW (ref 22–31)
CREAT SERPL-MCNC: 1 MG/DL — SIGNIFICANT CHANGE UP (ref 0.5–1.3)
EGFR: 82 ML/MIN/1.73M2 — SIGNIFICANT CHANGE UP
EGFR: 82 ML/MIN/1.73M2 — SIGNIFICANT CHANGE UP
GLUCOSE SERPL-MCNC: 131 MG/DL — HIGH (ref 70–99)
HCT VFR BLD CALC: 33.4 % — LOW (ref 39–50)
HGB BLD-MCNC: 11.6 G/DL — LOW (ref 13–17)
MAGNESIUM SERPL-MCNC: 2.1 MG/DL — SIGNIFICANT CHANGE UP (ref 1.6–2.6)
MCHC RBC-ENTMCNC: 30.8 PG — SIGNIFICANT CHANGE UP (ref 27–34)
MCHC RBC-ENTMCNC: 34.7 G/DL — SIGNIFICANT CHANGE UP (ref 32–36)
MCV RBC AUTO: 88.6 FL — SIGNIFICANT CHANGE UP (ref 80–100)
NRBC BLD AUTO-RTO: 0 /100 WBCS — SIGNIFICANT CHANGE UP (ref 0–0)
PHOSPHATE SERPL-MCNC: 2.1 MG/DL — LOW (ref 2.5–4.5)
PLATELET # BLD AUTO: 170 K/UL — SIGNIFICANT CHANGE UP (ref 150–400)
POTASSIUM SERPL-MCNC: 3.5 MMOL/L — SIGNIFICANT CHANGE UP (ref 3.5–5.3)
POTASSIUM SERPL-SCNC: 3.5 MMOL/L — SIGNIFICANT CHANGE UP (ref 3.5–5.3)
PROT SERPL-MCNC: 6.5 G/DL — SIGNIFICANT CHANGE UP (ref 6–8.3)
RBC # BLD: 3.77 M/UL — LOW (ref 4.2–5.8)
RBC # FLD: 13.2 % — SIGNIFICANT CHANGE UP (ref 10.3–14.5)
SODIUM SERPL-SCNC: 138 MMOL/L — SIGNIFICANT CHANGE UP (ref 135–145)
WBC # BLD: 11.3 K/UL — HIGH (ref 3.8–10.5)
WBC # FLD AUTO: 11.3 K/UL — HIGH (ref 3.8–10.5)

## 2025-04-20 PROCEDURE — 71275 CT ANGIOGRAPHY CHEST: CPT | Mod: 26

## 2025-04-20 PROCEDURE — 74174 CTA ABD&PLVS W/CONTRAST: CPT | Mod: 26

## 2025-04-20 PROCEDURE — 73030 X-RAY EXAM OF SHOULDER: CPT | Mod: 26,RT

## 2025-04-20 PROCEDURE — 99024 POSTOP FOLLOW-UP VISIT: CPT

## 2025-04-20 RX ORDER — LABETALOL HYDROCHLORIDE 200 MG/1
10 TABLET, FILM COATED ORAL ONCE
Refills: 0 | Status: DISCONTINUED | OUTPATIENT
Start: 2025-04-20 | End: 2025-04-20

## 2025-04-20 RX ORDER — SIMETHICONE 80 MG
80 TABLET,CHEWABLE ORAL EVERY 6 HOURS
Refills: 0 | Status: DISCONTINUED | OUTPATIENT
Start: 2025-04-20 | End: 2025-04-23

## 2025-04-20 RX ORDER — BISACODYL 5 MG
10 TABLET, DELAYED RELEASE (ENTERIC COATED) ORAL ONCE
Refills: 0 | Status: DISCONTINUED | OUTPATIENT
Start: 2025-04-20 | End: 2025-04-20

## 2025-04-20 RX ORDER — HYDROMORPHONE/SOD CHLOR,ISO/PF 2 MG/10 ML
1 SYRINGE (ML) INJECTION ONCE
Refills: 0 | Status: DISCONTINUED | OUTPATIENT
Start: 2025-04-20 | End: 2025-04-20

## 2025-04-20 RX ORDER — HYDROMORPHONE/SOD CHLOR,ISO/PF 2 MG/10 ML
0.5 SYRINGE (ML) INJECTION ONCE
Refills: 0 | Status: DISCONTINUED | OUTPATIENT
Start: 2025-04-20 | End: 2025-04-20

## 2025-04-20 RX ORDER — POTASSIUM PHOSPHATE, MONOBASIC POTASSIUM PHOSPHATE, DIBASIC INJECTION, 236; 224 MG/ML; MG/ML
15 SOLUTION, CONCENTRATE INTRAVENOUS ONCE
Refills: 0 | Status: COMPLETED | OUTPATIENT
Start: 2025-04-20 | End: 2025-04-20

## 2025-04-20 RX ORDER — BISACODYL 5 MG
10 TABLET, DELAYED RELEASE (ENTERIC COATED) ORAL ONCE
Refills: 0 | Status: DISCONTINUED | OUTPATIENT
Start: 2025-04-20 | End: 2025-04-22

## 2025-04-20 RX ADMIN — Medication 1000 MILLIGRAM(S): at 05:51

## 2025-04-20 RX ADMIN — Medication 1000 MILLIGRAM(S): at 06:13

## 2025-04-20 RX ADMIN — Medication 1 MILLIGRAM(S): at 09:49

## 2025-04-20 RX ADMIN — Medication 80 MILLIGRAM(S): at 08:02

## 2025-04-20 RX ADMIN — POTASSIUM PHOSPHATE, MONOBASIC POTASSIUM PHOSPHATE, DIBASIC INJECTION, 62.5 MILLIMOLE(S): 236; 224 SOLUTION, CONCENTRATE INTRAVENOUS at 11:23

## 2025-04-20 RX ADMIN — Medication 5 MILLIGRAM(S): at 21:07

## 2025-04-20 RX ADMIN — Medication 0.5 MILLIGRAM(S): at 05:51

## 2025-04-20 RX ADMIN — Medication 0.5 MILLIGRAM(S): at 08:03

## 2025-04-20 RX ADMIN — OXYCODONE HYDROCHLORIDE 10 MILLIGRAM(S): 30 TABLET ORAL at 21:48

## 2025-04-20 RX ADMIN — Medication 1000 MILLIGRAM(S): at 11:18

## 2025-04-20 RX ADMIN — Medication 1000 MILLIGRAM(S): at 22:39

## 2025-04-20 RX ADMIN — ENOXAPARIN SODIUM 40 MILLIGRAM(S): 100 INJECTION SUBCUTANEOUS at 05:51

## 2025-04-20 RX ADMIN — OXYCODONE HYDROCHLORIDE 10 MILLIGRAM(S): 30 TABLET ORAL at 09:24

## 2025-04-20 RX ADMIN — OXYCODONE HYDROCHLORIDE 10 MILLIGRAM(S): 30 TABLET ORAL at 17:28

## 2025-04-20 RX ADMIN — OXYCODONE HYDROCHLORIDE 10 MILLIGRAM(S): 30 TABLET ORAL at 08:54

## 2025-04-20 RX ADMIN — LIDOCAINE HYDROCHLORIDE 1 PATCH: 20 JELLY TOPICAL at 06:12

## 2025-04-20 RX ADMIN — LIDOCAINE HYDROCHLORIDE 1 PATCH: 20 JELLY TOPICAL at 17:32

## 2025-04-20 RX ADMIN — OXYCODONE HYDROCHLORIDE 10 MILLIGRAM(S): 30 TABLET ORAL at 22:18

## 2025-04-20 RX ADMIN — Medication 0.5 MILLIGRAM(S): at 11:47

## 2025-04-20 RX ADMIN — Medication 0.5 MILLIGRAM(S): at 11:17

## 2025-04-20 RX ADMIN — Medication 1000 MILLIGRAM(S): at 17:29

## 2025-04-20 RX ADMIN — Medication 1000 MILLIGRAM(S): at 11:47

## 2025-04-20 RX ADMIN — Medication 0.5 MILLIGRAM(S): at 08:33

## 2025-04-20 RX ADMIN — Medication 1000 MILLIGRAM(S): at 22:09

## 2025-04-20 RX ADMIN — Medication 1 MILLIGRAM(S): at 10:19

## 2025-04-20 RX ADMIN — Medication 10 MILLIGRAM(S): at 11:43

## 2025-04-20 NOTE — PROGRESS NOTE ADULT - ASSESSMENT
68yo M w/ PMHx CAD s/p CABG 2023 on Plavix (last dose 6 days prior to arrival) and HLD who presents for post-prandial lower chest/epigastric abdominal pain that first started 2 months ago and has been occurring intermittently since then with acute worsening of the same pain this past Monday admitted for and EGD/ERCP tomorrow 4/18.       Problem/Plan - 1:  ·  Problem: HTN (hypertension).   ·  Plan: much improved  received valsartan x 1 dose  now only on PRN hydralazine  continue to monitor  avoid further labetalol  secondary to ernst on admission.     Problem/Plan - 2:  ·  Problem: Elevated LFTs.   ·  Plan: likely secondary to biliary obstruction   s/p ERCP and lap jac  GI help appreciated  continue to hold clopidogrel till cleared by surgery  right shoulder pain likely referred pain from air under diaphragm post surgery   management per surgery.     Problem/Plan - 3:  ·  Problem: Epigastric pain.   ·  Plan: resolved   continue po pantoprazole.     Problem/Plan - 4:  ·  Problem: CAD (coronary artery disease).   ·  Plan: chest pain free  EKG no acute changes  no intervention at this time.     Problem/Plan - 5:  ·  Problem: Right Shoulder Pain .   ·  Plan: Likely referred pain.   Awaiting CT chest and Abdomen.   Pain medications per surgery. .

## 2025-04-20 NOTE — PROGRESS NOTE ADULT - ASSESSMENT
68yo M w/ PMHx CAD s/p CABG 2023 on Plavix (last dose 6 days prior to arrival) with OSH scan s/o choledocholithiasis w/o cholecystitis s/p for EGD/ ERCP with GI on 4/18 and laparoscopic cholecystectomy on 4/18. Severe R shoulder pain this AM likely referred pain from diaphragm from insufflation, remains HDS.     Plan:  - Pain control as needed  - EKG 04/19 wnl  - CTAP with contrast to evaluate for periampullary duodenal perf  - CTA chest r/o aortic dissection  - DVT ppx  - Regular diet  - OOB and ambulating as tolerated  - F/u AM labs  - hold Plavix 5d    Trauma -8051.

## 2025-04-20 NOTE — PROGRESS NOTE ADULT - SUBJECTIVE AND OBJECTIVE BOX
Trauma Surgery Progress Note    SUBJECTIVE  The patient was seen and examined. No acute events overnight. Pain controlled, afebrile w/ stable vitals. This AM with severe R shoulder pain similar to yesterday AM.    OBJECTIVE  ___________________________________________________  VITAL SIGNS / I&O's   Vital Signs Last 24 Hrs  T(C): 36.3 (20 Apr 2025 10:39), Max: 37.1 (19 Apr 2025 15:55)  T(F): 97.4 (20 Apr 2025 10:39), Max: 98.8 (19 Apr 2025 15:55)  HR: 82 (20 Apr 2025 10:39) (79 - 84)  BP: 173/111 (20 Apr 2025 10:39) (105/66 - 173/111)  BP(mean): --  RR: 18 (20 Apr 2025 10:39) (18 - 18)  SpO2: 92% (20 Apr 2025 10:39) (91% - 92%)    Parameters below as of 20 Apr 2025 10:39  Patient On (Oxygen Delivery Method): room air          19 Apr 2025 07:01  -  20 Apr 2025 07:00  --------------------------------------------------------  IN:  Total IN: 0 mL    OUT:    Voided (mL): 450 mL  Total OUT: 450 mL    Total NET: -450 mL        ___________________________________________________  PHYSICAL EXAM    General: Uncomfortable appearing, groaning in pain  Pulmonary: Nonlabored breathing, no respiratory distress  Abdominal: soft, appropriately tender at incision sites, opsites without strikethrough, non-distended   Extremities: Non edematous     ___________________________________________________  LABS                        11.6   11.30 )-----------( 170      ( 20 Apr 2025 07:06 )             33.4     20 Apr 2025 07:05    138    |  102    |  14     ----------------------------<  131    3.5     |  20     |  1.00     Ca    8.6        20 Apr 2025 07:05  Phos  2.1       20 Apr 2025 07:05  Mg     2.1       20 Apr 2025 07:05    TPro  6.5    /  Alb  4.0    /  TBili  1.2    /  DBili  0.4    /  AST  26     /  ALT  84     /  AlkPhos  90     20 Apr 2025 07:05      CAPILLARY BLOOD GLUCOSE        CARDIAC MARKERS ( 19 Apr 2025 01:20 )  x     / x     / x     / x     / 2.1 ng/mL      Urinalysis Basic - ( 20 Apr 2025 07:05 )    Color: x / Appearance: x / SG: x / pH: x  Gluc: 131 mg/dL / Ketone: x  / Bili: x / Urobili: x   Blood: x / Protein: x / Nitrite: x   Leuk Esterase: x / RBC: x / WBC x   Sq Epi: x / Non Sq Epi: x / Bacteria: x      ___________________________________________________  MICRO  Recent Cultures:    ___________________________________________________  MEDICATIONS  (STANDING):  acetaminophen     Tablet .. 1000 milliGRAM(s) Oral every 6 hours  enoxaparin Injectable 40 milliGRAM(s) SubCutaneous every 24 hours  lidocaine   4% Patch 1 Patch Transdermal every 24 hours  melatonin 5 milliGRAM(s) Oral at bedtime    MEDICATIONS  (PRN):  hydrALAZINE Injectable 10 milliGRAM(s) IV Push every 6 hours PRN for SBP>180  HYDROmorphone  Injectable 0.5 milliGRAM(s) IV Push every 4 hours PRN Severe Pain (7 - 10)  oxyCODONE    IR 5 milliGRAM(s) Oral every 4 hours PRN Moderate Pain (4 - 6)  oxyCODONE    IR 10 milliGRAM(s) Oral every 4 hours PRN Severe Pain (7 - 10)  simethicone 80 milliGRAM(s) Chew every 6 hours PRN Gas

## 2025-04-20 NOTE — PROGRESS NOTE ADULT - SUBJECTIVE AND OBJECTIVE BOX
Date of Service  : 04-20-25     INTERVAL HPI/OVERNIGHT EVENTS: Seen and examined earlier . Said Right shoulder pain.   Vital Signs Last 24 Hrs  T(C): 36.3 (20 Apr 2025 10:39), Max: 37.1 (19 Apr 2025 15:55)  T(F): 97.4 (20 Apr 2025 10:39), Max: 98.8 (19 Apr 2025 15:55)  HR: 82 (20 Apr 2025 10:39) (79 - 84)  BP: 173/111 (20 Apr 2025 10:39) (105/66 - 173/111)  BP(mean): --  RR: 18 (20 Apr 2025 10:39) (18 - 18)  SpO2: 92% (20 Apr 2025 10:39) (91% - 92%)    Parameters below as of 20 Apr 2025 10:39  Patient On (Oxygen Delivery Method): room air      I&O's Summary    19 Apr 2025 07:01  -  20 Apr 2025 07:00  --------------------------------------------------------  IN: 0 mL / OUT: 450 mL / NET: -450 mL      MEDICATIONS  (STANDING):  acetaminophen     Tablet .. 1000 milliGRAM(s) Oral every 6 hours  enoxaparin Injectable 40 milliGRAM(s) SubCutaneous every 24 hours  lidocaine   4% Patch 1 Patch Transdermal every 24 hours  melatonin 5 milliGRAM(s) Oral at bedtime    MEDICATIONS  (PRN):  hydrALAZINE Injectable 10 milliGRAM(s) IV Push every 6 hours PRN for SBP>180  HYDROmorphone  Injectable 0.5 milliGRAM(s) IV Push every 4 hours PRN Severe Pain (7 - 10)  oxyCODONE    IR 5 milliGRAM(s) Oral every 4 hours PRN Moderate Pain (4 - 6)  oxyCODONE    IR 10 milliGRAM(s) Oral every 4 hours PRN Severe Pain (7 - 10)  simethicone 80 milliGRAM(s) Chew every 6 hours PRN Gas    LABS:                        11.6   11.30 )-----------( 170      ( 20 Apr 2025 07:06 )             33.4     04-20    138  |  102  |  14  ----------------------------<  131[H]  3.5   |  20[L]  |  1.00    Ca    8.6      20 Apr 2025 07:05  Phos  2.1     04-20  Mg     2.1     04-20    TPro  6.5  /  Alb  4.0  /  TBili  1.2  /  DBili  0.4[H]  /  AST  26  /  ALT  84[H]  /  AlkPhos  90  04-20      Urinalysis Basic - ( 20 Apr 2025 07:05 )    Color: x / Appearance: x / SG: x / pH: x  Gluc: 131 mg/dL / Ketone: x  / Bili: x / Urobili: x   Blood: x / Protein: x / Nitrite: x   Leuk Esterase: x / RBC: x / WBC x   Sq Epi: x / Non Sq Epi: x / Bacteria: x      CAPILLARY BLOOD GLUCOSE            Urinalysis Basic - ( 20 Apr 2025 07:05 )    Color: x / Appearance: x / SG: x / pH: x  Gluc: 131 mg/dL / Ketone: x  / Bili: x / Urobili: x   Blood: x / Protein: x / Nitrite: x   Leuk Esterase: x / RBC: x / WBC x   Sq Epi: x / Non Sq Epi: x / Bacteria: x      REVIEW OF SYSTEMS:  CONSTITUTIONAL: No fever, weight loss, or fatigue  EYES: No eye pain, visual disturbances, or discharge  ENMT:  No difficulty hearing, tinnitus, vertigo; No sinus or throat pain  NECK: No pain or stiffness  RESPIRATORY: No cough, wheezing, chills or hemoptysis; No shortness of breath  CARDIOVASCULAR: No chest pain, palpitations, dizziness, or leg swelling  GASTROINTESTINAL: No abdominal or epigastric pain. No nausea, vomiting, or hematemesis; No diarrhea or constipation. No melena or hematochezia.  GENITOURINARY: No dysuria, frequency, hematuria, or incontinence  NEUROLOGICAL: No headaches, memory loss, loss of strength, numbness, or tremors    RADIOLOGY & ADDITIONAL TESTS:    Consultant(s) Notes Reviewed:  [x ] YES  [ ] NO    PHYSICAL EXAM:  GENERAL: NAD, well-groomed, well-developed,not in any distress ,  HEAD:  Atraumatic, Normocephalic  EYES: EOMI, PERRLA, conjunctiva and sclera clear  ENMT: No tonsillar erythema, exudates, or enlargement; Moist mucous membranes, Good dentition, No lesions  NECK: Supple, No JVD, Normal thyroid  NERVOUS SYSTEM:  Alert & Oriented X3, No focal deficit   CHEST/LUNG: Good air entry bilateral with no  rales, rhonchi, wheezing, or rubs  HEART: Regular rate and rhythm; No murmurs, rubs, or gallops  ABDOMEN: Soft, Nontender, Nondistended; Bowel sounds present, Multiple incisions+  EXTREMITIES:  2+ Peripheral Pulses, No clubbing, cyanosis, or edema      Care Discussed with Consultants/Other Providers [ x] YES  [ ] NO

## 2025-04-21 ENCOUNTER — TRANSCRIPTION ENCOUNTER (OUTPATIENT)
Age: 70
End: 2025-04-21

## 2025-04-21 LAB
ADD ON TEST-SPECIMEN IN LAB: SIGNIFICANT CHANGE UP
ALBUMIN SERPL ELPH-MCNC: 3.5 G/DL — SIGNIFICANT CHANGE UP (ref 3.3–5)
ALBUMIN SERPL ELPH-MCNC: 3.6 G/DL — SIGNIFICANT CHANGE UP (ref 3.3–5)
ALP SERPL-CCNC: 128 U/L — HIGH (ref 40–120)
ALP SERPL-CCNC: 157 U/L — HIGH (ref 40–120)
ALT FLD-CCNC: 207 U/L — HIGH (ref 10–45)
ALT FLD-CCNC: 222 U/L — HIGH (ref 10–45)
ANION GAP SERPL CALC-SCNC: 11 MMOL/L — SIGNIFICANT CHANGE UP (ref 5–17)
ANION GAP SERPL CALC-SCNC: 13 MMOL/L — SIGNIFICANT CHANGE UP (ref 5–17)
AST SERPL-CCNC: 199 U/L — HIGH (ref 10–40)
AST SERPL-CCNC: 204 U/L — HIGH (ref 10–40)
BILIRUB DIRECT SERPL-MCNC: 5.7 MG/DL — HIGH (ref 0–0.3)
BILIRUB DIRECT SERPL-MCNC: 6.7 MG/DL — HIGH (ref 0–0.3)
BILIRUB INDIRECT FLD-MCNC: 2.4 MG/DL — HIGH (ref 0.2–1)
BILIRUB SERPL-MCNC: 10 MG/DL — HIGH (ref 0.2–1.2)
BILIRUB SERPL-MCNC: 8.1 MG/DL — HIGH (ref 0.2–1.2)
BUN SERPL-MCNC: 14 MG/DL — SIGNIFICANT CHANGE UP (ref 7–23)
BUN SERPL-MCNC: 16 MG/DL — SIGNIFICANT CHANGE UP (ref 7–23)
CALCIUM SERPL-MCNC: 8.3 MG/DL — LOW (ref 8.4–10.5)
CALCIUM SERPL-MCNC: 8.8 MG/DL — SIGNIFICANT CHANGE UP (ref 8.4–10.5)
CHLORIDE SERPL-SCNC: 101 MMOL/L — SIGNIFICANT CHANGE UP (ref 96–108)
CHLORIDE SERPL-SCNC: 102 MMOL/L — SIGNIFICANT CHANGE UP (ref 96–108)
CO2 SERPL-SCNC: 23 MMOL/L — SIGNIFICANT CHANGE UP (ref 22–31)
CO2 SERPL-SCNC: 23 MMOL/L — SIGNIFICANT CHANGE UP (ref 22–31)
CREAT SERPL-MCNC: 0.82 MG/DL — SIGNIFICANT CHANGE UP (ref 0.5–1.3)
CREAT SERPL-MCNC: 0.83 MG/DL — SIGNIFICANT CHANGE UP (ref 0.5–1.3)
EGFR: 95 ML/MIN/1.73M2 — SIGNIFICANT CHANGE UP
EGFR: 95 ML/MIN/1.73M2 — SIGNIFICANT CHANGE UP
EGFR: 96 ML/MIN/1.73M2 — SIGNIFICANT CHANGE UP
EGFR: 96 ML/MIN/1.73M2 — SIGNIFICANT CHANGE UP
GLUCOSE SERPL-MCNC: 104 MG/DL — HIGH (ref 70–99)
GLUCOSE SERPL-MCNC: 108 MG/DL — HIGH (ref 70–99)
HCT VFR BLD CALC: 27.4 % — LOW (ref 39–50)
HCT VFR BLD CALC: 31 % — LOW (ref 39–50)
HGB BLD-MCNC: 10.7 G/DL — LOW (ref 13–17)
HGB BLD-MCNC: 9.3 G/DL — LOW (ref 13–17)
MAGNESIUM SERPL-MCNC: 2.2 MG/DL — SIGNIFICANT CHANGE UP (ref 1.6–2.6)
MCHC RBC-ENTMCNC: 30.3 PG — SIGNIFICANT CHANGE UP (ref 27–34)
MCHC RBC-ENTMCNC: 30.7 PG — SIGNIFICANT CHANGE UP (ref 27–34)
MCHC RBC-ENTMCNC: 33.9 G/DL — SIGNIFICANT CHANGE UP (ref 32–36)
MCHC RBC-ENTMCNC: 34.5 G/DL — SIGNIFICANT CHANGE UP (ref 32–36)
MCV RBC AUTO: 88.8 FL — SIGNIFICANT CHANGE UP (ref 80–100)
MCV RBC AUTO: 89.3 FL — SIGNIFICANT CHANGE UP (ref 80–100)
NRBC BLD AUTO-RTO: 0 /100 WBCS — SIGNIFICANT CHANGE UP (ref 0–0)
NRBC BLD AUTO-RTO: 0 /100 WBCS — SIGNIFICANT CHANGE UP (ref 0–0)
PHOSPHATE SERPL-MCNC: 2.6 MG/DL — SIGNIFICANT CHANGE UP (ref 2.5–4.5)
PLATELET # BLD AUTO: 117 K/UL — LOW (ref 150–400)
PLATELET # BLD AUTO: 133 K/UL — LOW (ref 150–400)
POTASSIUM SERPL-MCNC: 3.7 MMOL/L — SIGNIFICANT CHANGE UP (ref 3.5–5.3)
POTASSIUM SERPL-MCNC: 4 MMOL/L — SIGNIFICANT CHANGE UP (ref 3.5–5.3)
POTASSIUM SERPL-SCNC: 3.7 MMOL/L — SIGNIFICANT CHANGE UP (ref 3.5–5.3)
POTASSIUM SERPL-SCNC: 4 MMOL/L — SIGNIFICANT CHANGE UP (ref 3.5–5.3)
PROT SERPL-MCNC: 5.5 G/DL — LOW (ref 6–8.3)
PROT SERPL-MCNC: 6.3 G/DL — SIGNIFICANT CHANGE UP (ref 6–8.3)
RBC # BLD: 3.07 M/UL — LOW (ref 4.2–5.8)
RBC # BLD: 3.49 M/UL — LOW (ref 4.2–5.8)
RBC # FLD: 13.2 % — SIGNIFICANT CHANGE UP (ref 10.3–14.5)
RBC # FLD: 13.2 % — SIGNIFICANT CHANGE UP (ref 10.3–14.5)
SODIUM SERPL-SCNC: 136 MMOL/L — SIGNIFICANT CHANGE UP (ref 135–145)
SODIUM SERPL-SCNC: 137 MMOL/L — SIGNIFICANT CHANGE UP (ref 135–145)
WBC # BLD: 4.31 K/UL — SIGNIFICANT CHANGE UP (ref 3.8–10.5)
WBC # BLD: 6.02 K/UL — SIGNIFICANT CHANGE UP (ref 3.8–10.5)
WBC # FLD AUTO: 4.31 K/UL — SIGNIFICANT CHANGE UP (ref 3.8–10.5)
WBC # FLD AUTO: 6.02 K/UL — SIGNIFICANT CHANGE UP (ref 3.8–10.5)

## 2025-04-21 PROCEDURE — 99233 SBSQ HOSP IP/OBS HIGH 50: CPT | Mod: GC,25

## 2025-04-21 PROCEDURE — 43274 ERCP DUCT STENT PLACEMENT: CPT | Mod: GC

## 2025-04-21 PROCEDURE — 43264 ERCP REMOVE DUCT CALCULI: CPT | Mod: GC

## 2025-04-21 PROCEDURE — 74360 X-RAY GUIDE GI DILATION: CPT | Mod: 26,GC

## 2025-04-21 PROCEDURE — 78226 HEPATOBILIARY SYSTEM IMAGING: CPT | Mod: 26

## 2025-04-21 DEVICE — BLLN EXTRACT FUSION QUATRO 8.5 10 12 15MM: Type: IMPLANTABLE DEVICE | Status: FUNCTIONAL

## 2025-04-21 DEVICE — GWIRE JAGTOME REVOLUTION RX 260CM/0.025IN: Type: IMPLANTABLE DEVICE | Status: FUNCTIONAL

## 2025-04-21 DEVICE — AUTOTOME CANNULATING SPHINCTEROTOME RX 44 20MM: Type: IMPLANTABLE DEVICE | Status: FUNCTIONAL

## 2025-04-21 DEVICE — STENT BIL WALL RX FC RMV US 8X80MM: Type: IMPLANTABLE DEVICE | Status: FUNCTIONAL

## 2025-04-21 RX ORDER — SODIUM CHLORIDE 9 G/1000ML
500 INJECTION, SOLUTION INTRAVENOUS
Refills: 0 | Status: DISCONTINUED | OUTPATIENT
Start: 2025-04-21 | End: 2025-04-21

## 2025-04-21 RX ORDER — INDOMETHACIN 50 MG
100 CAPSULE ORAL ONCE
Refills: 0 | Status: COMPLETED | OUTPATIENT
Start: 2025-04-21 | End: 2025-04-21

## 2025-04-21 RX ORDER — ACETAMINOPHEN 500 MG/5ML
1000 LIQUID (ML) ORAL EVERY 6 HOURS
Refills: 0 | Status: DISCONTINUED | OUTPATIENT
Start: 2025-04-21 | End: 2025-04-23

## 2025-04-21 RX ORDER — HYDROMORPHONE/SOD CHLOR,ISO/PF 2 MG/10 ML
1 SYRINGE (ML) INJECTION EVERY 4 HOURS
Refills: 0 | Status: DISCONTINUED | OUTPATIENT
Start: 2025-04-21 | End: 2025-04-23

## 2025-04-21 RX ORDER — SODIUM CHLORIDE 9 G/1000ML
1000 INJECTION, SOLUTION INTRAVENOUS
Refills: 0 | Status: DISCONTINUED | OUTPATIENT
Start: 2025-04-21 | End: 2025-04-23

## 2025-04-21 RX ORDER — POLYETHYLENE GLYCOL 3350 17 G/17G
17 POWDER, FOR SOLUTION ORAL DAILY
Refills: 0 | Status: DISCONTINUED | OUTPATIENT
Start: 2025-04-21 | End: 2025-04-23

## 2025-04-21 RX ADMIN — Medication 5 MILLIGRAM(S): at 21:21

## 2025-04-21 RX ADMIN — OXYCODONE HYDROCHLORIDE 10 MILLIGRAM(S): 30 TABLET ORAL at 11:32

## 2025-04-21 RX ADMIN — OXYCODONE HYDROCHLORIDE 10 MILLIGRAM(S): 30 TABLET ORAL at 12:15

## 2025-04-21 RX ADMIN — Medication 400 MILLIGRAM(S): at 23:00

## 2025-04-21 RX ADMIN — Medication 1 MILLIGRAM(S): at 14:57

## 2025-04-21 RX ADMIN — Medication 1000 MILLIGRAM(S): at 23:30

## 2025-04-21 RX ADMIN — Medication 0.5 MILLIGRAM(S): at 12:57

## 2025-04-21 RX ADMIN — Medication 1000 MILLIGRAM(S): at 05:41

## 2025-04-21 RX ADMIN — Medication 0.5 MILLIGRAM(S): at 12:15

## 2025-04-21 RX ADMIN — POLYETHYLENE GLYCOL 3350 17 GRAM(S): 17 POWDER, FOR SOLUTION ORAL at 11:32

## 2025-04-21 RX ADMIN — ENOXAPARIN SODIUM 40 MILLIGRAM(S): 100 INJECTION SUBCUTANEOUS at 05:12

## 2025-04-21 RX ADMIN — Medication 400 MILLIGRAM(S): at 12:36

## 2025-04-21 RX ADMIN — Medication 0.5 MILLIGRAM(S): at 11:41

## 2025-04-21 RX ADMIN — Medication 0.5 MILLIGRAM(S): at 13:45

## 2025-04-21 RX ADMIN — Medication 100 MILLIGRAM(S): at 16:20

## 2025-04-21 RX ADMIN — Medication 1000 MILLIGRAM(S): at 05:11

## 2025-04-21 NOTE — PRE-ANESTHESIA EVALUATION ADULT - NSANTHOSAYNRD_GEN_A_CORE
No. PUMA screening performed.  STOP BANG Legend: 0-2 = LOW Risk; 3-4 = INTERMEDIATE Risk; 5-8 = HIGH Risk
No. PUMA screening performed.  STOP BANG Legend: 0-2 = LOW Risk; 3-4 = INTERMEDIATE Risk; 5-8 = HIGH Risk

## 2025-04-21 NOTE — PROGRESS NOTE ADULT - ASSESSMENT
70yo M w/ PMHx CAD s/p CABG 2023 on Plavix (last dose 6 days prior to arrival) and HLD who presents for post-prandial lower chest/epigastric abdominal pain that first started 2 months ago and has been occurring intermittently since then with acute worsening of the same pain this past Monday admitted for and EGD/ERCP tomorrow 4/18.       Problem/Plan - 1:  ·  Problem: HTN (hypertension).   ·  Plan: much improved  received valsartan x 1 dose  now only on PRN hydralazine  continue to monitor  avoid further labetalol  secondary to ernst on admission.     Problem/Plan - 2:  ·  Problem: Elevated LFTs  post ERCP and Lap Jac   ·  Plan: likely secondary to biliary obstruction   s/p ERCP and lap jac  GI and Surgery  help appreciated and w3ill defer management to them .  continue to hold clopidogrel till cleared by surgery       Problem/Plan - 3:  ·  Problem: Hemoperitoneum.   ·  Plan: Management per surgery      Problem/Plan - 4:  ·  Problem: CAD (coronary artery disease).   ·  Plan: chest pain free  EKG no acute changes  no intervention at this time.     Problem/Plan - 5:  ·  Problem: Right Shoulder Pain .   ·  Plan: Likely referred pain.   Improved.   Pain medications per surgery. .

## 2025-04-21 NOTE — PROGRESS NOTE ADULT - SUBJECTIVE AND OBJECTIVE BOX
Date of Service  : 04-21-25     INTERVAL HPI/OVERNIGHT EVENTS: seen and examined earlier . Said I have RUQ pain now . Right shoulder is better.   Vital Signs Last 24 Hrs  T(C): 36.8 (21 Apr 2025 09:00), Max: 37.2 (20 Apr 2025 20:22)  T(F): 98.3 (21 Apr 2025 09:00), Max: 98.9 (20 Apr 2025 20:22)  HR: 73 (21 Apr 2025 09:00) (73 - 105)  BP: 133/73 (21 Apr 2025 09:00) (108/64 - 133/73)  BP(mean): --  RR: 18 (21 Apr 2025 09:00) (18 - 18)  SpO2: 95% (21 Apr 2025 09:00) (91% - 100%)    Parameters below as of 21 Apr 2025 09:00  Patient On (Oxygen Delivery Method): room air      I&O's Summary    20 Apr 2025 07:01  -  21 Apr 2025 07:00  --------------------------------------------------------  IN: 700 mL / OUT: 500 mL / NET: 200 mL    21 Apr 2025 07:01  -  21 Apr 2025 12:44  --------------------------------------------------------  IN: 240 mL / OUT: 150 mL / NET: 90 mL      MEDICATIONS  (STANDING):  acetaminophen   IVPB .. 1000 milliGRAM(s) IV Intermittent every 6 hours  bisacodyl Suppository 10 milliGRAM(s) Rectal once  enoxaparin Injectable 40 milliGRAM(s) SubCutaneous every 24 hours  lidocaine   4% Patch 1 Patch Transdermal every 24 hours  melatonin 5 milliGRAM(s) Oral at bedtime  polyethylene glycol 3350 17 Gram(s) Oral daily    MEDICATIONS  (PRN):  hydrALAZINE Injectable 10 milliGRAM(s) IV Push every 6 hours PRN for SBP>180  HYDROmorphone  Injectable 0.5 milliGRAM(s) IV Push every 4 hours PRN Severe Pain (7 - 10)  oxyCODONE    IR 5 milliGRAM(s) Oral every 4 hours PRN Moderate Pain (4 - 6)  oxyCODONE    IR 10 milliGRAM(s) Oral every 4 hours PRN Severe Pain (7 - 10)  simethicone 80 milliGRAM(s) Chew every 6 hours PRN Gas    LABS:                        10.7   6.02  )-----------( 133      ( 21 Apr 2025 09:37 )             31.0     04-21    137  |  101  |  14  ----------------------------<  108[H]  3.7   |  23  |  0.83    Ca    8.8      21 Apr 2025 09:37  Phos  2.6     04-21  Mg     2.2     04-21    TPro  6.3  /  Alb  3.6  /  TBili  10.0[H]  /  DBili  6.7[H]  /  AST  204[H]  /  ALT  222[H]  /  AlkPhos  157[H]  04-21      Urinalysis Basic - ( 21 Apr 2025 09:37 )    Color: x / Appearance: x / SG: x / pH: x  Gluc: 108 mg/dL / Ketone: x  / Bili: x / Urobili: x   Blood: x / Protein: x / Nitrite: x   Leuk Esterase: x / RBC: x / WBC x   Sq Epi: x / Non Sq Epi: x / Bacteria: x      CAPILLARY BLOOD GLUCOSE            Urinalysis Basic - ( 21 Apr 2025 09:37 )    Color: x / Appearance: x / SG: x / pH: x  Gluc: 108 mg/dL / Ketone: x  / Bili: x / Urobili: x   Blood: x / Protein: x / Nitrite: x   Leuk Esterase: x / RBC: x / WBC x   Sq Epi: x / Non Sq Epi: x / Bacteria: x      REVIEW OF SYSTEMS:  CONSTITUTIONAL: No fever, weight loss, or fatigue  EYES: No eye pain, visual disturbances, or discharge  ENMT:  No difficulty hearing, tinnitus, vertigo; No sinus or throat pain  NECK: No pain or stiffness  RESPIRATORY: No cough, wheezing, chills or hemoptysis; No shortness of breath  CARDIOVASCULAR: No chest pain, palpitations, dizziness, or leg swelling  GASTROINTESTINAL: No abdominal or epigastric pain. No nausea, vomiting, or hematemesis; No diarrhea or constipation. No melena or hematochezia.  GENITOURINARY: No dysuria, frequency, hematuria, or incontinence  NEUROLOGICAL: No headaches, memory loss, loss of strength, numbness, or tremors      RADIOLOGY & ADDITIONAL TESTS:    Consultant(s) Notes Reviewed:  [x ] YES  [ ] NO    PHYSICAL EXAM:  GENERAL: NAD, well-groomed, well-developed,not in any distress ,  HEAD:  Atraumatic, Normocephalic  EYES: EOMI, PERRLA, conjunctiva and sclera clear  ENMT: No tonsillar erythema, exudates, or enlargement; Moist mucous membranes, Good dentition, No lesions  NECK: Supple, No JVD, Normal thyroid  NERVOUS SYSTEM:  Alert & Oriented X3, No focal deficit   CHEST/LUNG: Good air entry bilateral with no  rales, rhonchi, wheezing, or rubs  HEART: Regular rate and rhythm; No murmurs, rubs, or gallops  ABDOMEN: Soft, RUQ tender, Nondistended; Bowel sounds present  EXTREMITIES:  2+ Peripheral Pulses, No clubbing, cyanosis, or edema    Care Discussed with Consultants/Other Providers [ x] YES  [ ] NO

## 2025-04-21 NOTE — DISCHARGE NOTE PROVIDER - PROVIDER TOKENS
PROVIDER:[TOKEN:[[id number]],FOLLOWUP:[2 weeks]],PROVIDER:[TOKEN:[[id number]],FOLLOWUP:[2 weeks]]

## 2025-04-21 NOTE — DISCHARGE NOTE PROVIDER - HOSPITAL COURSE
68yo M w/ PMHx CAD s/p CABG 2023 on Plavix (last dose 6 days prior to arrival) and HLD who presents for post-prandial lower chest/epigastric abdominal pain that first started 2 months ago and has been occurring intermittently since then with acute worsening of the same pain this past Monday 4/14 prompting multiple outside hospital and clinic visits in the interim. Patient underwent at CT scan on 4/14 at OSH (details below) that reveals a 4.8mm distal CBD stone with CBD measuring 8mm above the stone. On assessment in ER this afternoon patient appears well. Denies n/v/f/c/ap currently. He has been adhering to a low fat diet and limiting his overall PO intake which has helped the pain subside. He has never had an EGD before. He reports last colonoscopy 15 years ago that was normal. Never had issues with anesthesia. He denies any other focus of infection at this time (no dysuria, cough, diarrhea, rash etc.). No family hx of GI malig. No one in family with gallstones. Denies heavy EtOH, never smoker, no drug use. Apart from Plavix which he held 6 days ago patient denies any other AC or antiplt use. Plan for direct admission and EGD/ERCP.  On 4/18 he was admitted to the acute care surgical service.  He underwent ERCP, sphincterotomy.  He was taken directly to the OR and underwent laparoscopic cholecystectomy.  Tolerated procedure well, was extubated and sent to PACU in stable condition.  Postoperatively he had significant abdominal pain with hypertension, CTA was performed which was negative for aortic dissection.  Severe pain persisted, GI was reconsulted, HIDA was performed which showed bile leak, he was taken for repeat ERCP.........................      PLEASE REVIEW AND UPDATE THIS SUMMARY WHEN THE PT IS DISCHARGED 70yo M w/ PMHx CAD s/p CABG 2023 on Plavix (last dose 6 days prior to arrival) and HLD who presents for post-prandial lower chest/epigastric abdominal pain that first started 2 months ago and has been occurring intermittently since then with acute worsening of the same pain this past Monday 4/14 prompting multiple outside hospital and clinic visits in the interim. Patient underwent at CT scan on 4/14 at OSH (details below) that reveals a 4.8mm distal CBD stone with CBD measuring 8mm above the stone. On assessment in ER this afternoon patient appears well. Denies n/v/f/c/ap currently. He has been adhering to a low fat diet and limiting his overall PO intake which has helped the pain subside. He has never had an EGD before. He reports last colonoscopy 15 years ago that was normal. Never had issues with anesthesia. He denies any other focus of infection at this time (no dysuria, cough, diarrhea, rash etc.). No family hx of GI malig. No one in family with gallstones. Denies heavy EtOH, never smoker, no drug use. Apart from Plavix which he held 6 days ago patient denies any other AC or antiplt use. Plan for direct admission and EGD/ERCP.  On 4/18 he was admitted to the acute care surgical service.  He underwent ERCP, sphincterotomy.  He was taken directly to the OR and underwent laparoscopic cholecystectomy.  Tolerated procedure well, was extubated and sent to PACU in stable condition.  Postoperatively he had significant abdominal pain with hypertension, CTA was performed which was negative for aortic dissection.  Severe pain persisted, GI was reconsulted, HIDA was performed which showed bile leak, he was taken for repeat ERCP.  Clots were swept from the duct, no active bleeding was seen. Accessory bile duct (Duct of Luschka) leak seen. 8x80mm FCSEMS was placed.  Pt to follow-up 2-3  weeks for repeat ERCP, stent removal and evaluation of stent.

## 2025-04-21 NOTE — PROGRESS NOTE ADULT - SUBJECTIVE AND OBJECTIVE BOX
Interval Events:   -called back as patient noted with TBili elevation this AM  -Sunday 4/20 TBili 1.2 --> Monday AM 4/21 TBili 8.1 then repeat 10.0  -this AM with mixed LFT pattern  -7:30am labwork with DBili 5.7, IBili 2.4  -patient reports he ate bread and drank juice at 7am and has been drinking water this AM  -he reports his urine became darker since Friday following his procedures  -he has not had a BM in 7 days. He reports he is passing flatus  -he reports some mild RUQ discomfort that started around 10:30am today, but it does not feel like the pain he has been having intermittently over the last 2 months    Hospital Medications:  acetaminophen     Tablet .. 1000 milliGRAM(s) Oral every 6 hours  bisacodyl Suppository 10 milliGRAM(s) Rectal once  enoxaparin Injectable 40 milliGRAM(s) SubCutaneous every 24 hours  hydrALAZINE Injectable 10 milliGRAM(s) IV Push every 6 hours PRN  HYDROmorphone  Injectable 0.5 milliGRAM(s) IV Push every 4 hours PRN  lidocaine   4% Patch 1 Patch Transdermal every 24 hours  melatonin 5 milliGRAM(s) Oral at bedtime  oxyCODONE    IR 5 milliGRAM(s) Oral every 4 hours PRN  oxyCODONE    IR 10 milliGRAM(s) Oral every 4 hours PRN  polyethylene glycol 3350 17 Gram(s) Oral daily  simethicone 80 milliGRAM(s) Chew every 6 hours PRN      PHYSICAL EXAM:   Vital Signs:  Vital Signs Last 24 Hrs  T(C): 36.8 (21 Apr 2025 09:00), Max: 37.2 (20 Apr 2025 20:22)  T(F): 98.3 (21 Apr 2025 09:00), Max: 98.9 (20 Apr 2025 20:22)  HR: 73 (21 Apr 2025 09:00) (73 - 105)  BP: 133/73 (21 Apr 2025 09:00) (108/64 - 133/73)  BP(mean): --  RR: 18 (21 Apr 2025 09:00) (18 - 18)  SpO2: 95% (21 Apr 2025 09:00) (91% - 100%)    Parameters below as of 21 Apr 2025 09:00  Patient On (Oxygen Delivery Method): room air      Daily     Daily     GENERAL: no acute distress  NEURO: AOx3, ambulatory  HEENT: NCAT, no conjunctival pallor appreciated  CHEST: no respiratory distress, no accessory muscle use  CARDIAC: regular rate, +S1/S2  ABDOMEN: soft, nontender, mildly distended, no rebound or guarding. Noted with laparoscopy incision sites, appear c/d/i  EXTREMITIES: warm, well perfused  SKIN: no lesions noted                          10.7   6.02  )-----------( 133      ( 21 Apr 2025 09:37 )             31.0     04-21    137  |  101  |  14  ----------------------------<  108[H]  3.7   |  23  |  0.83    Ca    8.8      21 Apr 2025 09:37  Phos  2.6     04-21  Mg     2.2     04-21    TPro  6.3  /  Alb  3.6  /  TBili  10.0[H]  /  DBili  x   /  AST  204[H]  /  ALT  222[H]  /  AlkPhos  157[H]  04-21    LIVER FUNCTIONS - ( 21 Apr 2025 09:37 )  Alb: 3.6 g/dL / Pro: 6.3 g/dL / ALK PHOS: 157 U/L / ALT: 222 U/L / AST: 204 U/L / GGT: x             Interval Diagnostic Studies:   < from: CT Angio Abdomen and Pelvis w/ IV Cont (04.20.25 @ 10:31) >  FINDINGS:  CHEST:  LUNGS AND LARGE AIRWAYS: Patent central airways. Bibasilar dependent   atelectasis.  PLEURA: Trace bilateral pleural effusions.  VESSELS: The thoracic aorta is normal in caliber. No evidence of aortic   dissection or aneurysm. Status post CABG.  HEART: Heart size qualitatively normal. No pericardial effusion.  MEDIASTINUM AND BUSTER: No lymphadenopathy.  CHEST WALL AND LOWER NECK: Sternotomy.    ABDOMEN AND PELVIS:  LIVER: Within normal limits.  BILE DUCTS: Normal caliber.  GALLBLADDER: Cholecystectomy with associated postoperative changes in the   surgical bed.  SPLEEN: Within normal limits.  PANCREAS: Within normal limits.  ADRENALS: Within normal limits.  KIDNEYS/URETERS: Within normal limits.    BLADDER: Within normal limits.  REPRODUCTIVE ORGANS: Prostate within normal limits.    BOWEL: No bowel obstruction. Colonic diverticulosis. At the base of the   cecum, there is a serpiginous hyperdensity of unclear etiology (images   758, series 7).  PERITONEUM/RETROPERITONEUM: Trace pneumoperitoneum is likely secondary to   recent postoperative state. There is hyperdense perihepatic and left   upper quadrant fluid collection. There is hyperdense fluid tracking along   the bilateral paracolic gutters and into the pelvis. The hyperdense fluid   component in the left upper quadrant measures about 14 x 8.5 cm.  VESSELS: The abdominal aorta is normal in caliber. No evidence of aortic   dissection or aneurysm. Celiac trunk, SMA, bilateral renal arteries, and   RICHELLE are patent. Atherosclerotic changes.  LYMPH NODES: No lymphadenopathy.  ABDOMINAL WALL: Postsurgical changes. Small bilateral fat-containing   inguinal hernias.  BONES: Degenerative changes.    IMPRESSION:    No aortic dissection.    Moderate volume of hemoperitoneum with largest component in the left   upper quadrant measuring about 14 x 8.5 cm.    Serpiginous hyperdensity at the base of the cecum is of unclear etiology   and not adequately characterized on this single phase arterial exam;   diagnostic considerations include post operative change if the patient   has had surgery in this area or intravenous contrast. Recommend   correlation with either noncontrast CT abdomen and pelvis or potentially   GI bleeding study if clinically indicated.    < end of copied text >

## 2025-04-21 NOTE — DISCHARGE NOTE PROVIDER - CARE PROVIDERS DIRECT ADDRESSES
,[email or web address], [email or web address]

## 2025-04-21 NOTE — PROGRESS NOTE ADULT - ASSESSMENT
70yo M w/ PMHx CAD s/p CABG 2023 on Plavix (last dose 6 days prior to arrival) with OSH scan s/o choledocholithiasis w/o cholecystitis s/p for EGD/ ERCP with GI on 4/18 and laparoscopic cholecystectomy on 4/18. Severe R shoulder pain this AM likely referred pain from diaphragm from insufflation, remains HDS.     Plan:  - Pain control as needed  - CXR wnl  - DVT ppx  - Regular diet  - OOB / amb  - F/u AM labs  - hold Plavix 5d    Acute care surgery, pager#[phone]

## 2025-04-21 NOTE — DISCHARGE NOTE PROVIDER - CARE PROVIDER_API CALL
Ashley Carroll  Surgical Critical Care  [address] [facility], [address]  [place] [address]  Phone: [phone]  Fax: [phone]  Follow Up Time: 2 weeks    Alina Lane  Gastroenterology  [address] [place] [address], [address] [place] [id number]  Phone: [phone]  Fax: [phone]  Follow Up Time: 2 weeks

## 2025-04-21 NOTE — CHART NOTE - NSCHARTNOTEFT_GEN_A_CORE
POST-OPERATIVE NOTE    Subjective:  Patient is s/p laparoscopic cholecystectomy. In PACU patient was hypertensive and got hydral and valsartan. On the floor, patient BP is lower. Patient reports he feels tired but denies lightheadedness or dizziness. He endorses some nausea. Denies any chest pain or SOB.     Vital Signs Last 24 Hrs  T(C): 36.5 (18 Apr 2025 19:10), Max: 36.8 (18 Apr 2025 17:10)  T(F): 97.7 (18 Apr 2025 19:10), Max: 98.3 (18 Apr 2025 17:10)  HR: 64 (18 Apr 2025 19:10) (49 - 66)  BP: 117/68 (18 Apr 2025 19:10) (90/58 - 194/97)  BP(mean): 92 (18 Apr 2025 15:57) (91 - 139)  RR: 18 (18 Apr 2025 19:10) (10 - 18)  SpO2: 98% (18 Apr 2025 19:10) (94% - 100%)    Parameters below as of 18 Apr 2025 19:10  Patient On (Oxygen Delivery Method): room air      I&O's Detail    18 Apr 2025 07:01  -  18 Apr 2025 19:50  --------------------------------------------------------  IN:    Oral Fluid: 120 mL  Total IN: 120 mL    OUT:    Voided (mL): 850 mL  Total OUT: 850 mL    Total NET: -730 mL        MEDICATIONS  hydrALAZINE Injectable 10 PRN  valsartan 160    PAST MEDICAL & SURGICAL HISTORY:  Hypertension      Hyperlipidemia      CAD (coronary artery disease)      Gout      CAD (coronary artery disease)      S/P arthroscopy of shoulder      History of percutaneous coronary intervention      Status post phlebectomy      S/P CABG (coronary artery bypass graft)      Physical Exam:  General: NAD, resting comfortably in bed  Pulmonary: Nonlabored breathing, no respiratory distress  Cardiovascular: Regular Rate  Abdominal: soft, appropriately tender at incision sites, opsites without strikethrough, non-distended   Extremities: Non edematous     LABS:                        13.8   3.95  )-----------( 118      ( 18 Apr 2025 06:59 )             39.4     04-18    140  |  107  |  11  ----------------------------<  74  3.7   |  21[L]  |  0.93    Ca    8.2[L]      18 Apr 2025 07:00  Mg     2.2     04-17    TPro  5.7[L]  /  Alb  3.5  /  TBili  0.9  /  DBili  x   /  AST  98[H]  /  ALT  144[H]  /  AlkPhos  119  04-18    PT/INR - ( 18 Apr 2025 06:59 )   PT: 11.5 sec;   INR: 1.01 ratio         PTT - ( 17 Apr 2025 14:31 )  PTT:31.5 sec  CAPILLARY BLOOD GLUCOSE          Radiology and Additional Studies:    68yo M w/ PMHx CAD s/p CABG 2023 on Plavix (last dose 6 days prior to arrival) with OSH scan s/o choledocholithiasis w/o cholecystitis s/p for EGD/ ERCP with GI on 4/18 and laparoscopic cholecystectomy on 4/18.     Plan:  - Pain control as needed  - DVT ppx  - Regular diet  - 500cc bolus given lower BP on floor, monitor BP (was hypertensive in PACU_  - OOB and ambulating as tolerated  - F/u AM labs    Trauma -1311
Post Operative Note  Patient: ERIK WALLER 69y (1955) Male   MRN: [id number]  Location: [facility] 2MON 229 W1  Visit: 04-17-25 Inpatient  Date: 04-21-25 @ 20:08    Procedure: S/P stent placement for bile leak     Subjective: Patient seen and examined post operatively. Reports pain as controlled. Denies nausea, vomiting. Recovering appropriately w no concerns at this time       Objective:  Vitals: T(F): 98.1 (04-21-25 @ 18:58), Max: 98.9 (04-20-25 @ 20:22)  HR: 77 (04-21-25 @ 18:58)  BP: 154/88 (04-21-25 @ 18:58) (107/57 - 154/88)  RR: 18 (04-21-25 @ 18:58)  SpO2: 96% (04-21-25 @ 18:58)  Vent Settings:     In:   04-20-25 @ 07:01  -  04-21-25 @ 07:00  --------------------------------------------------------  IN: 700 mL    04-21-25 @ 07:01  -  04-21-25 @ 20:08  --------------------------------------------------------  IN: 240 mL      IV Fluids: lactated ringers. 1000 milliLiter(s) (100 mL/Hr) IV Continuous <Continuous>      Out:   04-20-25 @ 07:01  -  04-21-25 @ 07:00  --------------------------------------------------------  OUT: 500 mL    04-21-25 @ 07:01  -  04-21-25 @ 20:08  --------------------------------------------------------  OUT: 150 mL      EBL:     Voided Urine:   04-20-25 @ 07:01  -  04-21-25 @ 07:00  --------------------------------------------------------  OUT: 500 mL    04-21-25 @ 07:01  -  04-21-25 @ 20:08  --------------------------------------------------------  OUT: 150 mL      Still Catheter: yes no   Drains:   SELMA:    ,   Chest Tube:      NG Tube:         Physical Examination:  General: NAD, resting comfortably in bed  HEENT: Normocephalic atraumatic  Respiratory: Nonlabored respirations, normal CW expansion.  Cardio:  regular rate and rhythm.  Abdomen: soft, non-distended, non-tender, surgical incisions are c/d/i.  Vascular: extremities are warm and well perfused.     Imaging:  No post-op imaging studies    Assessment:  69yMale patient S/P stent placement by GI for Duct of Luschka leak recovering well w no concerns at this time     Plan:  - Pain control PRN  - Diet: CLD  - IVF  - Activity: oobat  - DVT ppx: SCD's & lovenox    Date/Time: 04-21-25 @ 20:08
Discussed given abdominal pain, recommendation to obtain stat CTAP to rule out perforation  Surgery team in agreement with plan   Will f/u CT scan results    Chiquita Cuadra, PGY4  Gastroenterology/Hepatology Fellow  Available on Microsoft Teams  [phone] (Long Range Pager)  [phone] (Short Range Pager [facility])    After 5 pm, please contact the on-call GI fellow for any urgent issues via the Hospital Call

## 2025-04-21 NOTE — PROGRESS NOTE ADULT - SUBJECTIVE AND OBJECTIVE BOX
SURGERY DAILY PROGRESS NOTE:       Subjective / Overnight events:  R shoulder pain improving.  Tolerating diet, denies N/V.  CTA performed yesterday, no dissection.      Objective:      MEDICATIONS  (STANDING):  acetaminophen     Tablet .. 1000 milliGRAM(s) Oral every 6 hours  bisacodyl Suppository 10 milliGRAM(s) Rectal once  enoxaparin Injectable 40 milliGRAM(s) SubCutaneous every 24 hours  lidocaine   4% Patch 1 Patch Transdermal every 24 hours  melatonin 5 milliGRAM(s) Oral at bedtime  polyethylene glycol 3350 17 Gram(s) Oral daily    MEDICATIONS  (PRN):  hydrALAZINE Injectable 10 milliGRAM(s) IV Push every 6 hours PRN for SBP>180  HYDROmorphone  Injectable 0.5 milliGRAM(s) IV Push every 4 hours PRN Severe Pain (7 - 10)  oxyCODONE    IR 5 milliGRAM(s) Oral every 4 hours PRN Moderate Pain (4 - 6)  oxyCODONE    IR 10 milliGRAM(s) Oral every 4 hours PRN Severe Pain (7 - 10)  simethicone 80 milliGRAM(s) Chew every 6 hours PRN Gas      Vital Signs Last 24 Hrs  T(C): 36.8 (21 Apr 2025 09:00), Max: 37.2 (20 Apr 2025 20:22)  T(F): 98.3 (21 Apr 2025 09:00), Max: 98.9 (20 Apr 2025 20:22)  HR: 73 (21 Apr 2025 09:00) (73 - 105)  BP: 133/73 (21 Apr 2025 09:00) (108/64 - 182/104)  BP(mean): --  RR: 18 (21 Apr 2025 09:00) (18 - 18)  SpO2: 95% (21 Apr 2025 09:00) (91% - 100%)    Parameters below as of 21 Apr 2025 09:00  Patient On (Oxygen Delivery Method): room air        I&O's Detail    20 Apr 2025 07:01  -  21 Apr 2025 07:00  --------------------------------------------------------  IN:    IV PiggyBack: 250 mL    Oral Fluid: 450 mL  Total IN: 700 mL    OUT:    Voided (mL): 500 mL  Total OUT: 500 mL    Total NET: 200 mL      21 Apr 2025 07:01  -  21 Apr 2025 09:27  --------------------------------------------------------  IN:    Oral Fluid: 240 mL  Total IN: 240 mL    OUT:    Voided (mL): 150 mL  Total OUT: 150 mL    Total NET: 90 mL          Daily     Daily     LABS:                        9.3    4.31  )-----------( 117      ( 21 Apr 2025 07:29 )             27.4     04-21    136  |  102  |  16  ----------------------------<  104[H]  4.0   |  23  |  0.82    Ca    8.3[L]      21 Apr 2025 07:28  Phos  2.6     04-21  Mg     2.2     04-21    TPro  5.5[L]  /  Alb  3.5  /  TBili  8.1[H]  /  DBili  5.7[H]  /  AST  199[H]  /  ALT  207[H]  /  AlkPhos  128[H]  04-21      Urinalysis Basic - ( 21 Apr 2025 07:28 )    Color: x / Appearance: x / SG: x / pH: x  Gluc: 104 mg/dL / Ketone: x  / Bili: x / Urobili: x   Blood: x / Protein: x / Nitrite: x   Leuk Esterase: x / RBC: x / WBC x   Sq Epi: x / Non Sq Epi: x / Bacteria: x            PHYSICAL EXAM    General: NAD, resting comfortably in bed  Pulmonary: Nonlabored breathing, no respiratory distress  Cardiovascular: Regular Rate  Abdominal: soft, appropriately tender at incision sites, opsites without strikethrough, non-distended   Extremities: Non edematous

## 2025-04-21 NOTE — DISCHARGE NOTE PROVIDER - NSDCCPCAREPLAN_GEN_ALL_CORE_FT
PRINCIPAL DISCHARGE DIAGNOSIS  Diagnosis: Choledocholithiasis  Assessment and Plan of Treatment: WOUND CARE: -Covering your incisions are white pieces of tape called steri-strips. You can shower with these and they will curl up and begin to fall off on their own in about 7 days.   BATHING: Please do not submerge wound underwater. You may shower and/or sponge bathe.  ACTIVITY: No heavy lifting or straining. Otherwise, you may return to your usual level of physical activity. If you are taking narcotic pain medication (such as Percocet), do NOT drive a car, operate machinery or make important decisions.  DIET: Return to your usual diet.  NOTIFY YOUR SURGEON IF: You have any bleeding that does not stop, any pus draining from your wound, any fever (over 100.4 F) or chills, persistent nausea/vomiting, persistent diarrhea, or if your pain is not controlled on your discharge pain medications.  FOLLOW-UP:  1. Follow-up with Dr. Carroll  or one of their partners: Dr. Warren, Dr. Thacker,, Dr. Campo, Dr. Pina, Dr. Rogers, Dr. Martines, Dr. Satrr, Dr. Witt, Dr. Barry or Dr. Desir.  Please call [phone] to schedule an appointment in 2-4 weeks  within 1-2 weeks of discharge.  Please call office for appointment  2. Please follow up with your primary care physician in one week regarding your hospitalization.      SECONDARY DISCHARGE DIAGNOSES  Diagnosis: Elevated LFTs  Assessment and Plan of Treatment: Follow-up with GI Dr. Lane within 2-4 weeks.  Please call office for appointment.     PRINCIPAL DISCHARGE DIAGNOSIS  Diagnosis: Choledocholithiasis  Assessment and Plan of Treatment: WOUND CARE: -Covering your incisions are white pieces of tape called steri-strips. You can shower with these and they will curl up and begin to fall off on their own in about 7 days.   BATHING: Please do not submerge wound underwater. You may shower and/or sponge bathe.  ACTIVITY: No heavy lifting or straining. Otherwise, you may return to your usual level of physical activity. If you are taking narcotic pain medication (such as Percocet), do NOT drive a car, operate machinery or make important decisions.  DIET: Return to your usual diet.  NOTIFY YOUR SURGEON IF: You have any bleeding that does not stop, any pus draining from your wound, any fever (over 100.4 F) or chills, persistent nausea/vomiting, persistent diarrhea, or if your pain is not controlled on your discharge pain medications.  FOLLOW-UP:  1. Follow-up with Dr. Carroll  or one of their partners: Dr. Warren, Dr. Thacker, Dr. Campo, Dr. Pina, Dr. Rogers, Dr. Martines, Dr. Starr, Dr. Witt, Dr. Barry or Dr. Desir.  Please call [phone] to schedule an appointment within 1-2 weeks of discharge.  Please call office for appointment.  2. Please follow up with your primary care physician in one week regarding your hospitalization.      SECONDARY DISCHARGE DIAGNOSES  Diagnosis: Elevated LFTs  Assessment and Plan of Treatment: Follow-up with GI Dr. Lane in 4 weeks. You will need a repeat ERCP in 2-3 months. Please call office for appointment.

## 2025-04-21 NOTE — PRE PROCEDURE NOTE - PRE PROCEDURE EVALUATION
Pre-Endoscopy Evaluation    Attending Physician:  Dr. Alina Lane    Procedure: ERCP    Indication for Procedure & Pertinent History: Concern for bile leak s/p ERCP and cholecystectomy 4/18    PAST MEDICAL & SURGICAL HISTORY:  Hypertension      Hyperlipidemia      CAD (coronary artery disease)      Gout      CAD (coronary artery disease)      S/P arthroscopy of shoulder      History of percutaneous coronary intervention      Status post phlebectomy      S/P CABG (coronary artery bypass graft)          Allergies    No Known Allergies    Intolerances        Medications: MEDICATIONS  (STANDING):  acetaminophen   IVPB .. 1000 milliGRAM(s) IV Intermittent every 6 hours  bisacodyl Suppository 10 milliGRAM(s) Rectal once  enoxaparin Injectable 40 milliGRAM(s) SubCutaneous every 24 hours  lidocaine   4% Patch 1 Patch Transdermal every 24 hours  melatonin 5 milliGRAM(s) Oral at bedtime  polyethylene glycol 3350 17 Gram(s) Oral daily    MEDICATIONS  (PRN):  hydrALAZINE Injectable 10 milliGRAM(s) IV Push every 6 hours PRN for SBP>180  HYDROmorphone  Injectable 1 milliGRAM(s) IV Push every 4 hours PRN for breakthrough pain  oxyCODONE    IR 5 milliGRAM(s) Oral every 4 hours PRN Moderate Pain (4 - 6)  oxyCODONE    IR 10 milliGRAM(s) Oral every 4 hours PRN Severe Pain (7 - 10)  simethicone 80 milliGRAM(s) Chew every 6 hours PRN Gas      Pertinent lab data:                        10.7   6.02  )-----------( 133      ( 21 Apr 2025 09:37 )             31.0     04-21    137  |  101  |  14  ----------------------------<  108[H]  3.7   |  23  |  0.83    Ca    8.8      21 Apr 2025 09:37  Phos  2.6     04-21  Mg     2.2     04-21    TPro  6.3  /  Alb  3.6  /  TBili  10.0[H]  /  DBili  6.7[H]  /  AST  204[H]  /  ALT  222[H]  /  AlkPhos  157[H]  04-21                Physical Examination:  Daily     Daily   Vital Signs Last 24 Hrs  T(C): 36.8 (21 Apr 2025 09:00), Max: 37.2 (20 Apr 2025 20:22)  T(F): 98.3 (21 Apr 2025 09:00), Max: 98.9 (20 Apr 2025 20:22)  HR: 73 (21 Apr 2025 09:00) (73 - 89)  BP: 133/73 (21 Apr 2025 09:00) (108/64 - 133/73)  BP(mean): --  RR: 18 (21 Apr 2025 09:00) (18 - 18)  SpO2: 95% (21 Apr 2025 09:00) (91% - 100%)    Parameters below as of 21 Apr 2025 09:00  Patient On (Oxygen Delivery Method): room air      GENERAL: no acute distress  NEURO: AOx3, ambulatory  HEENT: NCAT, no conjunctival pallor appreciated  CHEST: no respiratory distress, no accessory muscle use  CARDIAC: regular rate, +S1/S2  ABDOMEN: soft, nontender, mildly distended, no rebound or guarding. Noted with laparoscopy incision sites, appear c/d/i  EXTREMITIES: warm, well perfused  SKIN: no lesions noted    Comments:    ASA Class: I []  II []  III [x]  IV []    The patient is a suitable candidate for the planned procedure unless box checked [ ]  No, explain:    Note incomplete until finalized by attending signature/attestation.    Yevgeniy Musa MD   Gastroenterology/Hepatology Fellow PGY-6  Available on Microsoft Teams 7am - 5pm  [id number]    
Attending Physician: Dr Lane                            Procedure: ERCP     Indication for Procedure: Choledocholithiasis  ________________________________________________________  PAST MEDICAL & SURGICAL HISTORY:  CAD (coronary artery disease)      S/P CABG (coronary artery bypass graft)        ALLERGIES:  No Known Allergies    HOME MEDICATIONS:  clopidogrel 75 mg oral tablet: 1 tab(s) orally once a day  rosuvastatin 40 mg oral tablet: 1 tab(s) orally once a day (at bedtime)    AICD/PPM: [ ] yes   [x ] no    PERTINENT LAB DATA:                        13.8   3.95  )-----------( 118      ( 18 Apr 2025 06:59 )             39.4     04-18    140  |  107  |  11  ----------------------------<  74  3.7   |  21[L]  |  0.93    Ca    8.2[L]      18 Apr 2025 07:00  Mg     2.2     04-17    TPro  5.7[L]  /  Alb  3.5  /  TBili  0.9  /  DBili  x   /  AST  98[H]  /  ALT  144[H]  /  AlkPhos  119  04-18    PT/INR - ( 18 Apr 2025 06:59 )   PT: 11.5 sec;   INR: 1.01 ratio         PTT - ( 17 Apr 2025 14:31 )  PTT:31.5 sec            PHYSICAL EXAMINATION:    Height (cm): 175.3  Weight (kg): 95.3  BMI (kg/m2): 31  BSA (m2): 2.11T(C): 36.6  HR: 54  BP: 152/82  RR: 18  SpO2: 97%    Constitutional: NAD    Neck:  No JVD  Respiratory: normal effort   Cardiovascular: rrr  Extremities: No peripheral edema  Neurological: A/O x 4, no focal deficits        COMMENTS:    The patient is a suitable candidate for the planned procedure unless box checked [ ]  No, explain:    
Pre-operative Note    - Pre-operative Diagnosis: Choledocholithiasis    - Procedure: Laparoscopic cholecystectomy, possible open    - Labs:                        13.8   3.95  )-----------( 118      ( 18 Apr 2025 06:59 )             39.4     04-18    140  |  107  |  11  ----------------------------<  74  3.7   |  21[L]  |  0.93    Ca    8.2[L]      18 Apr 2025 07:00  Mg     2.2     04-17    TPro  5.7[L]  /  Alb  3.5  /  TBili  0.9  /  DBili  x   /  AST  98[H]  /  ALT  144[H]  /  AlkPhos  119  04-18    PT/INR - ( 18 Apr 2025 06:59 )   PT: 11.5 sec;   INR: 1.01 ratio         PTT - ( 17 Apr 2025 14:31 )  PTT:31.5 sec  Type & Screen #1: Completed  Type & Screen #2: Completed    - Orders:  > NPO  > ERCP this AM  > Pre-op labs obtained    - Permits:  > Consent in chart.  > Case scheduled with OR.
Attending Physician:  Dr Lane                           Procedure: ERCP    Indication for Procedure: bile leak  ________________________________________________________  PAST MEDICAL & SURGICAL HISTORY:  Hypertension      Hyperlipidemia      CAD (coronary artery disease)      Gout      CAD (coronary artery disease)      S/P arthroscopy of shoulder      History of percutaneous coronary intervention      Status post phlebectomy      S/P CABG (coronary artery bypass graft)        ALLERGIES:  No Known Allergies    HOME MEDICATIONS:  acetaminophen 325 mg oral tablet: 2 tab(s) orally every 6 hours, As needed, for mild to moderate pain  clopidogrel 75 mg oral tablet: 1 tab(s) orally once a day  rosuvastatin 20 mg oral tablet: 1 tab(s) orally once a day  rosuvastatin 40 mg oral tablet: 1 tab(s) orally once a day (at bedtime)    AICD/PPM: [ ] yes   [x ] no    PERTINENT LAB DATA:                        10.7   6.02  )-----------( 133      ( 21 Apr 2025 09:37 )             31.0     04-21    137  |  101  |  14  ----------------------------<  108[H]  3.7   |  23  |  0.83    Ca    8.8      21 Apr 2025 09:37  Phos  2.6     04-21  Mg     2.2     04-21    TPro  6.3  /  Alb  3.6  /  TBili  10.0[H]  /  DBili  6.7[H]  /  AST  204[H]  /  ALT  222[H]  /  AlkPhos  157[H]  04-21                PHYSICAL EXAMINATION:    T(C): 36.8  HR: 73  BP: 133/73  RR: 18  SpO2: 95%    Constitutional: NAD    Neck:  No JVD  Respiratory: normal effort   Cardiovascular: RRR  Extremities: No peripheral edema  Neurological: A/O x 3, no focal deficits        COMMENTS:    The patient is a suitable candidate for the planned procedure unless box checked [ ]  No, explain:

## 2025-04-21 NOTE — DISCHARGE NOTE PROVIDER - NSDCMRMEDTOKEN_GEN_ALL_CORE_FT
acetaminophen 325 mg oral tablet: 2 tab(s) orally every 6 hours, As needed, for mild to moderate pain  aspirin 81 mg oral delayed release tablet: 1 tab(s) orally once a day  clopidogrel 75 mg oral tablet: 1 tab(s) orally once a day  clopidogrel 75 mg oral tablet: 1 tab(s) orally once a day  metoprolol tartrate 25 mg oral tablet: 1 tab(s) orally 2 times a day  oxyCODONE 5 mg oral tablet: 1 tab(s) orally every 6 hours, As Needed -severe pain MDD:4 tabs  rosuvastatin 20 mg oral tablet: 1 tab(s) orally once a day  rosuvastatin 40 mg oral tablet: 1 tab(s) orally once a day (at bedtime)  senna leaf extract oral tablet: 2 tab(s) orally once a day (at bedtime) as needed for constipation   acetaminophen 325 mg oral tablet: 2 tab(s) orally every 6 hours, As needed, for mild to moderate pain  aspirin 81 mg oral delayed release tablet: 1 tab(s) orally once a day  clopidogrel 75 mg oral tablet: 1 tab(s) orally once a day  metoprolol tartrate 25 mg oral tablet: 1 tab(s) orally 2 times a day  oxyCODONE 5 mg oral tablet: 1 tab(s) orally every 6 hours, As Needed -severe pain MDD:4 tabs  rosuvastatin 40 mg oral tablet: 1 tab(s) orally once a day (at bedtime)  senna leaf extract oral tablet: 2 tab(s) orally once a day (at bedtime) as needed for constipation

## 2025-04-21 NOTE — PROGRESS NOTE ADULT - ASSESSMENT
70yo M w/ PMHx CAD s/p CABG 2023 on Plavix (last dose 6 days prior to arrival) and HLD who presents for post-prandial lower chest/epigastric abdominal pain that first started 2 months ago and has been occurring intermittently since then with acute worsening of the same pain this past Monday 4/14 prompting multiple outside hospital and clinic visits in the interim. Patient underwent at CT scan on 4/14 at OSH (details below) that reveals a 4.8mm distal CBD stone with CBD measuring 8mm above the stone.    #Abdominal Pain  #Choledocholithiasis with CBD Dilation s/p sphincterotomy and cholcyestctomy  #CABG on Plavix (last dose 6 days prior to arrival)    P/w choledocholithiasis on imaging from OSH 4/14 (detailed above) with low suspicion for cholangitis. s/p ERCP 4/18 showing erosive gastritis and choledocholithiasis s/p complete removal with sphincterotomy and balloon dilation. After ERCP, pt went directly to OR for lap jac. With acute onset of severe abdominal pain over weekend and distension raised c/f post ERCP pancreatitis though lipase found to be normal and pancreas WNL on cross sectional imaging). Perforation also on ddx as patient found with moderate volume of hemoperitoneum with largest component in the left upper quadrant measuring about 14 x 8.5 cm. Called back again 4/21 as patient noted with TBili rising from around 1 on 4/20 to 8-10 on 4/21 AM.    Recommendations:  - Trend daily CBC, CMP  - keep NPO  - repeat ERCP vs MRCP today - will notify team regarding plan shortly  - Te as per primary team      All recommendations preliminary until note signed by service attending.    Thank you for involving us in the care of this patient. Please contact should any concern or questions arise.    Yevgeniy Musa MD   Gastroenterology/Hepatology Fellow PGY-6  Available on [employer] Teams 7am - 5pm  [id number]     70yo M w/ PMHx CAD s/p CABG 2023 on Plavix (last dose 6 days prior to arrival) and HLD who presents for post-prandial lower chest/epigastric abdominal pain that first started 2 months ago and has been occurring intermittently since then with acute worsening of the same pain this past Monday 4/14 prompting multiple outside hospital and clinic visits in the interim. Patient underwent at CT scan on 4/14 at OSH (details below) that reveals a 4.8mm distal CBD stone with CBD measuring 8mm above the stone.    #Abdominal Pain  #Choledocholithiasis with CBD Dilation s/p sphincterotomy and cholcyestctomy  #CABG on Plavix (last dose 6 days prior to arrival)    P/w choledocholithiasis on imaging from OSH 4/14 (detailed above) with low suspicion for cholangitis. s/p ERCP 4/18 showing erosive gastritis and choledocholithiasis s/p complete removal with sphincterotomy and balloon dilation. After ERCP, pt went directly to OR for lap jac. With acute onset of severe abdominal pain over weekend and distension raised c/f post ERCP pancreatitis though lipase found to be normal and pancreas WNL on cross sectional imaging). Perforation also on ddx as patient found with moderate volume of hemoperitoneum with largest component in the left upper quadrant measuring about 14 x 8.5 cm. Called back again 4/21 as patient noted with TBili rising from around 1 on 4/20 to 8-10 on 4/21 AM. Concern for bile leak vs choledocholithiasis.    Recommendations:  - Trend daily CBC, CMP  - please obtain HIDA scan today  - keep NPO for possible procedure pending HIDA result  - Te as per primary team      All recommendations preliminary until note signed by service attending.    Thank you for involving us in the care of this patient. Please contact should any concern or questions arise.    Yevgeniy Musa MD   Gastroenterology/Hepatology Fellow PGY-6  Available on [employer] Teams 7am - 5pm  [id number]     70yo M w/ PMHx CAD s/p CABG 2023 on Plavix (last dose 6 days prior to arrival) and hyperlipidemia who presents for post-prandial lower chest/epigastric abdominal pain that first started 2 months ago and has been occurring intermittently since then with acute worsening of the same pain this past Monday 4/14 prompting multiple outside hospital and clinic visits in the interim. Patient underwent at CT scan on 4/14 at OSH (details below) that reveals a 4.8mm distal CBD stone with CBD measuring 8mm above the stone.    #Abdominal Pain  #Choledocholithiasis with CBD Dilation s/p sphincterotomy and cholcystectomy  #CABG on Plavix (last dose 6 days prior to arrival)    P/w choledocholithiasis on imaging from OSH 4/14 (detailed above) with low suspicion for cholangitis. s/p ERCP 4/18 showing erosive gastritis and choledocholithiasis s/p complete removal with sphincterotomy and balloon dilation. After ERCP, pt went directly to OR for lap jac. With acute onset of severe abdominal pain over weekend and distension raised c/f post ERCP pancreatitis though lipase found to be normal and pancreas WNL on cross sectional imaging). Perforation also on ddx as patient found with moderate volume of hemoperitoneum with largest component in the left upper quadrant measuring about 14 x 8.5 cm. Called back again 4/21 as patient noted with TBili rising from around 1 on 4/20 to 8-10 on 4/21 AM. Concern for bile leak vs choledocholithiasis.    Recommendations:  - Trend daily CBC, CMP  - please obtain HIDA scan today  - keep NPO for possible procedure pending HIDA result  - Te as per primary team      All recommendations preliminary until note signed by service attending.    Thank you for involving us in the care of this patient. Please contact should any concern or questions arise.    Yevgeniy Musa MD   Gastroenterology/Hepatology Fellow PGY-6  Available on [employer] Teams 7am - 5pm  [id number]

## 2025-04-22 LAB
ADD ON TEST-SPECIMEN IN LAB: SIGNIFICANT CHANGE UP
ALBUMIN SERPL ELPH-MCNC: 3.7 G/DL — SIGNIFICANT CHANGE UP (ref 3.3–5)
ALP SERPL-CCNC: 144 U/L — HIGH (ref 40–120)
ALT FLD-CCNC: 153 U/L — HIGH (ref 10–45)
ANION GAP SERPL CALC-SCNC: 14 MMOL/L — SIGNIFICANT CHANGE UP (ref 5–17)
AST SERPL-CCNC: 77 U/L — HIGH (ref 10–40)
BILIRUB DIRECT SERPL-MCNC: 1.2 MG/DL — HIGH (ref 0–0.3)
BILIRUB INDIRECT FLD-MCNC: 1.7 MG/DL — HIGH (ref 0.2–1)
BILIRUB SERPL-MCNC: 2.9 MG/DL — HIGH (ref 0.2–1.2)
BUN SERPL-MCNC: 14 MG/DL — SIGNIFICANT CHANGE UP (ref 7–23)
CALCIUM SERPL-MCNC: 9 MG/DL — SIGNIFICANT CHANGE UP (ref 8.4–10.5)
CHLORIDE SERPL-SCNC: 100 MMOL/L — SIGNIFICANT CHANGE UP (ref 96–108)
CO2 SERPL-SCNC: 22 MMOL/L — SIGNIFICANT CHANGE UP (ref 22–31)
CREAT SERPL-MCNC: 0.79 MG/DL — SIGNIFICANT CHANGE UP (ref 0.5–1.3)
EGFR: 97 ML/MIN/1.73M2 — SIGNIFICANT CHANGE UP
EGFR: 97 ML/MIN/1.73M2 — SIGNIFICANT CHANGE UP
GLUCOSE SERPL-MCNC: 121 MG/DL — HIGH (ref 70–99)
HCT VFR BLD CALC: 31.6 % — LOW (ref 39–50)
HGB BLD-MCNC: 10.7 G/DL — LOW (ref 13–17)
MAGNESIUM SERPL-MCNC: 2.3 MG/DL — SIGNIFICANT CHANGE UP (ref 1.6–2.6)
MCHC RBC-ENTMCNC: 30.3 PG — SIGNIFICANT CHANGE UP (ref 27–34)
MCHC RBC-ENTMCNC: 33.9 G/DL — SIGNIFICANT CHANGE UP (ref 32–36)
MCV RBC AUTO: 89.5 FL — SIGNIFICANT CHANGE UP (ref 80–100)
NRBC BLD AUTO-RTO: 0 /100 WBCS — SIGNIFICANT CHANGE UP (ref 0–0)
PHOSPHATE SERPL-MCNC: 1.9 MG/DL — LOW (ref 2.5–4.5)
PLATELET # BLD AUTO: 161 K/UL — SIGNIFICANT CHANGE UP (ref 150–400)
POTASSIUM SERPL-MCNC: 3.9 MMOL/L — SIGNIFICANT CHANGE UP (ref 3.5–5.3)
POTASSIUM SERPL-SCNC: 3.9 MMOL/L — SIGNIFICANT CHANGE UP (ref 3.5–5.3)
PROT SERPL-MCNC: 6.5 G/DL — SIGNIFICANT CHANGE UP (ref 6–8.3)
RBC # BLD: 3.53 M/UL — LOW (ref 4.2–5.8)
RBC # FLD: 13 % — SIGNIFICANT CHANGE UP (ref 10.3–14.5)
SODIUM SERPL-SCNC: 136 MMOL/L — SIGNIFICANT CHANGE UP (ref 135–145)
SURGICAL PATHOLOGY STUDY: SIGNIFICANT CHANGE UP
WBC # BLD: 8.3 K/UL — SIGNIFICANT CHANGE UP (ref 3.8–10.5)
WBC # FLD AUTO: 8.3 K/UL — SIGNIFICANT CHANGE UP (ref 3.8–10.5)

## 2025-04-22 PROCEDURE — 99233 SBSQ HOSP IP/OBS HIGH 50: CPT | Mod: GC

## 2025-04-22 RX ORDER — BISACODYL 5 MG
10 TABLET, DELAYED RELEASE (ENTERIC COATED) ORAL ONCE
Refills: 0 | Status: DISCONTINUED | OUTPATIENT
Start: 2025-04-22 | End: 2025-04-23

## 2025-04-22 RX ORDER — KETOROLAC TROMETHAMINE 30 MG/ML
15 INJECTION, SOLUTION INTRAMUSCULAR; INTRAVENOUS ONCE
Refills: 0 | Status: DISCONTINUED | OUTPATIENT
Start: 2025-04-22 | End: 2025-04-22

## 2025-04-22 RX ORDER — SODIUM PHOSPHATE,DIBASIC DIHYD
30 POWDER (GRAM) MISCELLANEOUS ONCE
Refills: 0 | Status: COMPLETED | OUTPATIENT
Start: 2025-04-22 | End: 2025-04-22

## 2025-04-22 RX ORDER — TRAMADOL HYDROCHLORIDE 50 MG/1
50 TABLET, FILM COATED ORAL EVERY 6 HOURS
Refills: 0 | Status: DISCONTINUED | OUTPATIENT
Start: 2025-04-22 | End: 2025-04-23

## 2025-04-22 RX ORDER — SOD PHOS DI, MONO/K PHOS MONO 250 MG
2 TABLET ORAL ONCE
Refills: 0 | Status: COMPLETED | OUTPATIENT
Start: 2025-04-22 | End: 2025-04-22

## 2025-04-22 RX ORDER — TRAMADOL HYDROCHLORIDE 50 MG/1
25 TABLET, FILM COATED ORAL EVERY 4 HOURS
Refills: 0 | Status: DISCONTINUED | OUTPATIENT
Start: 2025-04-22 | End: 2025-04-23

## 2025-04-22 RX ORDER — ONDANSETRON HCL/PF 4 MG/2 ML
4 VIAL (ML) INJECTION ONCE
Refills: 0 | Status: COMPLETED | OUTPATIENT
Start: 2025-04-22 | End: 2025-04-22

## 2025-04-22 RX ORDER — BISACODYL 5 MG
10 TABLET, DELAYED RELEASE (ENTERIC COATED) ORAL ONCE
Refills: 0 | Status: COMPLETED | OUTPATIENT
Start: 2025-04-22 | End: 2025-04-22

## 2025-04-22 RX ADMIN — KETOROLAC TROMETHAMINE 15 MILLIGRAM(S): 30 INJECTION, SOLUTION INTRAMUSCULAR; INTRAVENOUS at 12:35

## 2025-04-22 RX ADMIN — Medication 80 MILLIGRAM(S): at 17:10

## 2025-04-22 RX ADMIN — OXYCODONE HYDROCHLORIDE 10 MILLIGRAM(S): 30 TABLET ORAL at 08:50

## 2025-04-22 RX ADMIN — POLYETHYLENE GLYCOL 3350 17 GRAM(S): 17 POWDER, FOR SOLUTION ORAL at 10:12

## 2025-04-22 RX ADMIN — SODIUM CHLORIDE 100 MILLILITER(S): 9 INJECTION, SOLUTION INTRAVENOUS at 21:24

## 2025-04-22 RX ADMIN — OXYCODONE HYDROCHLORIDE 10 MILLIGRAM(S): 30 TABLET ORAL at 04:06

## 2025-04-22 RX ADMIN — Medication 5 MILLIGRAM(S): at 21:24

## 2025-04-22 RX ADMIN — TRAMADOL HYDROCHLORIDE 50 MILLIGRAM(S): 50 TABLET, FILM COATED ORAL at 22:10

## 2025-04-22 RX ADMIN — Medication 10 MILLIGRAM(S): at 10:18

## 2025-04-22 RX ADMIN — Medication 4 MILLIGRAM(S): at 22:55

## 2025-04-22 RX ADMIN — Medication 85 MILLIMOLE(S): at 10:16

## 2025-04-22 RX ADMIN — OXYCODONE HYDROCHLORIDE 10 MILLIGRAM(S): 30 TABLET ORAL at 03:36

## 2025-04-22 RX ADMIN — Medication 2 PACKET(S): at 10:12

## 2025-04-22 RX ADMIN — Medication 400 MILLIGRAM(S): at 05:07

## 2025-04-22 RX ADMIN — Medication 1 MILLIGRAM(S): at 10:16

## 2025-04-22 RX ADMIN — Medication 1 MILLIGRAM(S): at 10:37

## 2025-04-22 RX ADMIN — Medication 10 MILLIGRAM(S): at 05:01

## 2025-04-22 RX ADMIN — OXYCODONE HYDROCHLORIDE 10 MILLIGRAM(S): 30 TABLET ORAL at 07:50

## 2025-04-22 RX ADMIN — TRAMADOL HYDROCHLORIDE 50 MILLIGRAM(S): 50 TABLET, FILM COATED ORAL at 21:36

## 2025-04-22 RX ADMIN — Medication 1000 MILLIGRAM(S): at 05:37

## 2025-04-22 RX ADMIN — Medication 4 MILLIGRAM(S): at 15:21

## 2025-04-22 RX ADMIN — ENOXAPARIN SODIUM 40 MILLIGRAM(S): 100 INJECTION SUBCUTANEOUS at 05:07

## 2025-04-22 RX ADMIN — Medication 40 MILLIGRAM(S): at 12:11

## 2025-04-22 RX ADMIN — TRAMADOL HYDROCHLORIDE 25 MILLIGRAM(S): 50 TABLET, FILM COATED ORAL at 15:21

## 2025-04-22 RX ADMIN — KETOROLAC TROMETHAMINE 15 MILLIGRAM(S): 30 INJECTION, SOLUTION INTRAMUSCULAR; INTRAVENOUS at 12:07

## 2025-04-22 RX ADMIN — TRAMADOL HYDROCHLORIDE 25 MILLIGRAM(S): 50 TABLET, FILM COATED ORAL at 16:15

## 2025-04-22 NOTE — PROGRESS NOTE ADULT - SUBJECTIVE AND OBJECTIVE BOX
Date of Service  : 04-22-25     INTERVAL HPI/OVERNIGHT EVENTS: I feel little better.   Vital Signs Last 24 Hrs  T(C): 36.8 (22 Apr 2025 16:26), Max: 37.1 (21 Apr 2025 21:15)  T(F): 98.2 (22 Apr 2025 16:26), Max: 98.7 (21 Apr 2025 21:15)  HR: 82 (22 Apr 2025 16:26) (66 - 84)  BP: 160/82 (22 Apr 2025 16:26) (112/70 - 208/87)  BP(mean): --  RR: 18 (22 Apr 2025 16:26) (18 - 18)  SpO2: 95% (22 Apr 2025 16:26) (94% - 95%)    Parameters below as of 22 Apr 2025 16:26  Patient On (Oxygen Delivery Method): room air      I&O's Summary    21 Apr 2025 07:01  -  22 Apr 2025 07:00  --------------------------------------------------------  IN: 1680 mL / OUT: 800 mL / NET: 880 mL    22 Apr 2025 07:01  -  22 Apr 2025 20:17  --------------------------------------------------------  IN: 740 mL / OUT: 600 mL / NET: 140 mL      MEDICATIONS  (STANDING):  acetaminophen   IVPB .. 1000 milliGRAM(s) IV Intermittent every 6 hours  bisacodyl Suppository 10 milliGRAM(s) Rectal once  enoxaparin Injectable 40 milliGRAM(s) SubCutaneous every 24 hours  lactated ringers. 1000 milliLiter(s) (100 mL/Hr) IV Continuous <Continuous>  lidocaine   4% Patch 1 Patch Transdermal every 24 hours  melatonin 5 milliGRAM(s) Oral at bedtime  polyethylene glycol 3350 17 Gram(s) Oral daily    MEDICATIONS  (PRN):  hydrALAZINE Injectable 10 milliGRAM(s) IV Push every 6 hours PRN for SBP>180  HYDROmorphone  Injectable 1 milliGRAM(s) IV Push every 4 hours PRN for breakthrough pain  simethicone 80 milliGRAM(s) Chew every 6 hours PRN Gas  traMADol 25 milliGRAM(s) Oral every 4 hours PRN Moderate Pain (4 - 6)  traMADol 50 milliGRAM(s) Oral every 6 hours PRN Severe Pain (7 - 10)    LABS:                        10.7   8.30  )-----------( 161      ( 22 Apr 2025 07:05 )             31.6     04-22    136  |  100  |  14  ----------------------------<  121[H]  3.9   |  22  |  0.79    Ca    9.0      22 Apr 2025 07:05  Phos  1.9     04-22  Mg     2.3     04-22    TPro  6.5  /  Alb  3.7  /  TBili  2.9[H]  /  DBili  1.2[H]  /  AST  77[H]  /  ALT  153[H]  /  AlkPhos  144[H]  04-22      Urinalysis Basic - ( 22 Apr 2025 07:05 )    Color: x / Appearance: x / SG: x / pH: x  Gluc: 121 mg/dL / Ketone: x  / Bili: x / Urobili: x   Blood: x / Protein: x / Nitrite: x   Leuk Esterase: x / RBC: x / WBC x   Sq Epi: x / Non Sq Epi: x / Bacteria: x      CAPILLARY BLOOD GLUCOSE            Urinalysis Basic - ( 22 Apr 2025 07:05 )    Color: x / Appearance: x / SG: x / pH: x  Gluc: 121 mg/dL / Ketone: x  / Bili: x / Urobili: x   Blood: x / Protein: x / Nitrite: x   Leuk Esterase: x / RBC: x / WBC x   Sq Epi: x / Non Sq Epi: x / Bacteria: x      REVIEW OF SYSTEMS:  CONSTITUTIONAL: No fever, weight loss, or fatigue  EYES: No eye pain, visual disturbances, or discharge  ENMT:  No difficulty hearing, tinnitus, vertigo; No sinus or throat pain  NECK: No pain or stiffness  RESPIRATORY: No cough, wheezing, chills or hemoptysis; No shortness of breath  CARDIOVASCULAR: No chest pain, palpitations, dizziness, or leg swelling  GASTROINTESTINAL: No abdominal or epigastric pain. No nausea, vomiting, or hematemesis; No diarrhea or constipation. No melena or hematochezia.  GENITOURINARY: No dysuria, frequency, hematuria, or incontinence  NEUROLOGICAL: No headaches, memory loss, loss of strength, numbness, or tremors      RADIOLOGY & ADDITIONAL TESTS:    Consultant(s) Notes Reviewed:  [x ] YES  [ ] NO    PHYSICAL EXAM:  GENERAL: NAD, well-groomed, well-developed,not in any distress ,  HEAD:  Atraumatic, Normocephalic  EYES: EOMI, PERRLA, conjunctiva and sclera clear  ENMT: No tonsillar erythema, exudates, or enlargement; Moist mucous membranes, Good dentition, No lesions  NECK: Supple, No JVD, Normal thyroid  NERVOUS SYSTEM:  Alert & Oriented X3, No focal deficit   CHEST/LUNG: Good air entry bilateral with no  rales, rhonchi, wheezing, or rubs  HEART: Regular rate and rhythm; No murmurs, rubs, or gallops  ABDOMEN: Soft, Nontender, Nondistended; Bowel sounds present, Dressed Incision wounds +  EXTREMITIES:  2+ Peripheral Pulses, No clubbing, cyanosis, or edema      Care Discussed with Consultants/Other Providers [ x] YES  [ ] NO

## 2025-04-22 NOTE — PROVIDER CONTACT NOTE (OTHER) - SITUATION
pt with /87
C/o severe abdominal pain
Pt states he has severe 10 out of 10 upper abdominal pain radiating to the right shoulder. Pt states it is difficult to breathe and pain is worse with movement.

## 2025-04-22 NOTE — PROGRESS NOTE ADULT - ASSESSMENT
68yo M w/ PMHx CAD s/p CABG 2023 on Plavix (last dose 6 days prior to arrival) and HLD who presents for post-prandial lower chest/epigastric abdominal pain that first started 2 months ago and has been occurring intermittently since then with acute worsening of the same pain this past Monday admitted for and EGD/ERCP tomorrow 4/18.       Problem/Plan - 1:  ·  Problem: HTN (hypertension).   ·  Plan: Needs pian control.  much improved  received valsartan x 1 dose  now only on PRN hydralazine  continue to monitor  avoid further labetalol  secondary to ernst on admission.     Problem/Plan - 2:  ·  Problem: Elevated LFTs  post ERCP and Lap Jac   ·  Plan: likely secondary to biliary obstruction   s/p ERCP and lap jac  GI and Surgery  help appreciated and w3ill defer management to them .  continue to hold clopidogrel till cleared by surgery    < from: ERCP (04.21.25 @ 15:47) >                                                                                                       Impression:          - Clots were swept from the duct, no active bleeding was seen.                       - Accesory bile duct (Duct of Luschka) leak seen. 8x80mm FCSEMS was placed.  Recommendation:      - Repeat ERCP in 2-3 months for stent removal and evaluation of leak                        resolution                       - Advance diet as tolerated.                       - Pain control PRN.                       - Consider abdominal collection drainage.                       - Return patient to [facility] for ongoing care.    < end of copied text >     Problem/Plan - 3:  ·  Problem: Hemoperitoneum.   ·  Plan: Management per surgery      Problem/Plan - 4:  ·  Problem: CAD (coronary artery disease).   ·  Plan: chest pain free  EKG no acute changes  no intervention at this time.     Problem/Plan - 5:  ·  Problem: Right Shoulder Pain .   ·  Plan: Likely referred pain.   Improved.   Pain medications per surgery. .    D/W patient and his wife .

## 2025-04-22 NOTE — PROGRESS NOTE ADULT - ATTENDING COMMENTS
seen and examined 04-19-25 @ 0908    he developed severe right shoulder pain after awakening this morning  pain persists despite hydromorphone 1 mg IV  no nausea or vomiting    WBC = 9  hgb - 12.2 -> 11.4 g/dL  HCO3 - 22  LFTs - wnl (except ALT = 94 which is improved)  amylase - 65  lipase - 27      4/18/2025 - ERCP / biliary sphincterotomy for choledocholithiasis  4/18/2025 - laparoscopic cholecystectomy for cholelithiasis with choledocholithiasis  -Right shoulder pain could be secondary to recent pneumoperitoneum for laparoscopy, but I would not expect it to be so severe.  -CTA chest to r/o aortic dissection given transient perioperative hypertension in PACU and to evaluate for pulmonary embolism  -CT abd/pelvis w/ contrast since he is immediately post-op
seen and examined 04-20-25 @ 0915    he had severe right shoulder pain after awakening yesterday morning  the pain subsided on reevaluation yesterday, and he was found to be sleeping in the afternoon  This morning, he again appears very uncomfortable with right shoulder pain.  no nausea or vomiting    WBC = 9 -> 11  hgb - 11.6 g/dL  HCO3 - 20  LFTs - wnl (except ALT = 84 which is improved)    amylase - 65 (4/19)  lipase - 27 (4/19)      4/18/2025 - ERCP / biliary sphincterotomy for choledocholithiasis  4/18/2025 - laparoscopic cholecystectomy for cholelithiasis with choledocholithiasis  -Right shoulder pain could be secondary to recent pneumoperitoneum for laparoscopy, but I would not expect it to be so severe. We initially planned CTA chest and CT abd/pelvis yesterday, but after speaking with hospitalist, we agreed that these scans were not necessary.  -We spoke with advanced endoscopy fellow this morning. Will do CT abd/pelvis w/ contrast to evaluate for periampullary duodenal perforation. We will also do CTA chest to r/o aortic dissection.      plan discussed with his significant other Angelica Jimenez ([facility] RN) by phone.
ATTENDING ATTESTATION:    69M history of CAD s/p CABG (2023) on plavix (last dose 6 days ago) admitted with choledocholithiasis.   ERCP today with choledocholithiasis identified, CBD cleared and sphincterotomy done.   Spoke with patient prior to ERCP. Reports on and off RUQ abdominal pain for the past couple of months sometimes after eating. Otherwise good exercise tolerance, no dyspnea on exertion or at rest.   Never had abdominal surgery.    Labs:  WBC normal  AST 98,   Tbili 0.8    Exam:  Abd soft, nontender, nondistended  Tiny umbilical hernia, reducible   Previous chest tube scars at xiphoid level    RUQ US at OSH:  - gallbladder hydrops, no thickening or pericholecystic fluid  - CBD stone 4mm with dilation to 8mm    A/P: Choledocholithiasis with symptomatic cholelithiasis.   - Laparoscopic cholecystectomy following ERCP   - Coordinated with anesthesia and OR, plan to bring patient to OR intubated   - Discussed risks of bleeding, infection and injury to surrounding structures such as a the CBD. Discussed reasonable expectations of post-op recovery.     Discussed with patient and his girlfriend Angelica ([phone]) as well as Dr. Robles who he had been referred to.   All in agreement to proceed.       Total time spent in the care of this patient today (excluding critical care, teaching & procedures): 50 minutes    Over 50% of the total time was spent in discussion and coordination of care with consulting services, dietary and rehab services.    Shawna Garcia MD  Acute Care Surgery
Patient seen and examined this morning.  Mr. Robbins has experienced severe right shoulder pian since the laparoscopic cholecystomy which he is currently POD #3. He has remained afebrile and hemoynamically appropriate. He underwent a CT scan which showed free fluid in the abdomen and around the spleen with possible splenic bleeding. On review of his laboratory data his LFTS were acutely abnormal today with total bilirubin elevated to 8 then on repeat 10 as well as increased transaminitis. HIDA demonstrated a bile leak. He then underwent an ERCP where there was leaking from duct of Luschka and a stent was placed.   On exam he was jaundiced and abdomen did have right upper quadrant discomfort with no rebound or guarding.   Labs reviewed this morning and significant findings above.     A/P: 69 year old man POD #3 s/p laparoscopic cholecystectomy complicated by biliary leak from duct of Luschka.  -will advance diet to CLD as tolerated  -serial abdominal exams  -repeat labs in the am  - I discussed possible drainage of the intraabdominal bile if it continued to cause pain.
As above  70yo M w/ PMHx CAD s/p CABG 2023 on Plavix (last dose 6 days prior to arrival) and hyperlipidemia presented with symptomatic choledocholithiasis and cholelithiasis  S/p ERCP on Friday with ductal clearance followed by cholecystectomy with ACS  C/b hemoperitoneum   Now with acute jump in TB to 10 today  Please obtain HIDA vs MRCP to eval for bile leak vs retained stone    Thank you for this interesting consult.  Please call the advanced GI service with any questions or concerns.
As above  Bile leak s/p lap jac from duct of Lushka  S/p ERCP yesterday with stent placement  Improved TBili today  Diet as tolerated  Rest of care per surgical team  Repeat ERCP in 2-3 months for stent removal    Thank you for this interesting consult.  Please call the advanced GI service with any questions or concerns.
Post ERCP / cholecystectomy with abdominal pain.  Obtain CT A/P and lipase  Plan of care will be based on workup.

## 2025-04-22 NOTE — PROGRESS NOTE ADULT - ASSESSMENT
68yo M w/ PMHx CAD s/p CABG 2023 on Plavix (last dose 6 days prior to arrival) with OSH scan s/o choledocholithiasis w/o cholecystitis s/p for EGD/ ERCP with GI on 4/18 and laparoscopic cholecystectomy on 4/18. Severe R shoulder pain this AM likely referred pain from diaphragm from insufflation, remains HDS. Now s/p stent placement by GI for Duct of Luschka leak    Plan:  - Diet: CLD   - Pain control as needed  - DVT ppx  - OOB / amb  - F/u AM labs  - hold Plavix 5d    Acute care surgery, pager#[phone]    70yo M w/ PMHx CAD s/p CABG 2023 on Plavix (last dose 6 days prior to arrival) with OSH scan s/o choledocholithiasis w/o cholecystitis s/p for EGD/ ERCP with GI on 4/18 and laparoscopic cholecystectomy on 4/18. Severe R shoulder pain this AM likely referred pain from diaphragm from insufflation, remains HDS. Now s/p stent placement by GI for Duct of Luschka leak    Plan:  - Diet: CLD   - Pain control as needed  - DVT ppx  - OOB / amb  - F/u AM labs  - hold Plavix 5d  - bowel regimen started     Acute care surgery, pager#[phone]

## 2025-04-22 NOTE — PROGRESS NOTE ADULT - SUBJECTIVE AND OBJECTIVE BOX
Interval Events:   -NAEON  -s/p repeat ERCP yesterday 4/22, details below  -TBili normalizing  -patient with improvement of AP    Hospital Medications:  acetaminophen   IVPB .. 1000 milliGRAM(s) IV Intermittent every 6 hours  bisacodyl Suppository 10 milliGRAM(s) Rectal once  enoxaparin Injectable 40 milliGRAM(s) SubCutaneous every 24 hours  hydrALAZINE Injectable 10 milliGRAM(s) IV Push every 6 hours PRN  HYDROmorphone  Injectable 1 milliGRAM(s) IV Push every 4 hours PRN  lactated ringers. 1000 milliLiter(s) IV Continuous <Continuous>  lidocaine   4% Patch 1 Patch Transdermal every 24 hours  melatonin 5 milliGRAM(s) Oral at bedtime  polyethylene glycol 3350 17 Gram(s) Oral daily  simethicone 80 milliGRAM(s) Chew every 6 hours PRN  traMADol 25 milliGRAM(s) Oral every 4 hours PRN  traMADol 50 milliGRAM(s) Oral every 6 hours PRN      PHYSICAL EXAM:   Vital Signs:  Vital Signs Last 24 Hrs  T(C): 36.7 (22 Apr 2025 09:10), Max: 37.1 (21 Apr 2025 21:15)  T(F): 98.1 (22 Apr 2025 09:10), Max: 98.7 (21 Apr 2025 21:15)  HR: 84 (22 Apr 2025 09:10) (66 - 85)  BP: 168/90 (22 Apr 2025 10:01) (107/57 - 208/87)  BP(mean): 92 (21 Apr 2025 16:05) (92 - 92)  RR: 18 (22 Apr 2025 09:10) (15 - 18)  SpO2: 94% (22 Apr 2025 09:10) (93% - 96%)    Parameters below as of 22 Apr 2025 09:10  Patient On (Oxygen Delivery Method): room air      Daily Height in cm: 175.26 (21 Apr 2025 16:05)    Daily     GENERAL: no acute distress  NEURO: alert  HEENT: NCAT, no conjunctival pallor appreciated  CHEST: no respiratory distress, no accessory muscle use  CARDIAC: regular rate, +S1/S2  ABDOMEN: soft, nontender, no rebound or guarding  EXTREMITIES: warm, well perfused                          10.7   8.30  )-----------( 161      ( 22 Apr 2025 07:05 )             31.6     04-22    136  |  100  |  14  ----------------------------<  121[H]  3.9   |  22  |  0.79    Ca    9.0      22 Apr 2025 07:05  Phos  1.9     04-22  Mg     2.3     04-22    TPro  6.5  /  Alb  3.7  /  TBili  2.9[H]  /  DBili  1.2[H]  /  AST  77[H]  /  ALT  153[H]  /  AlkPhos  144[H]  04-22    LIVER FUNCTIONS - ( 22 Apr 2025 07:05 )  Alb: 3.7 g/dL / Pro: 6.5 g/dL / ALK PHOS: 144 U/L / ALT: 153 U/L / AST: 77 U/L / GGT: x             Interval Diagnostic Studies:  < from: ERCP (04.21.25 @ 15:47) >  Findings:       The  film was normal.       The duodenoscope was passed to the 2nd portion of the duodenum to the major papilla, prior        sphincterotomy seen.       The CBD was cannulated with a 3.9F sphinctertome over a 0.025in guidewire.       Contrast was injected and flouroscopic images were obtained and personally interpreted by me.        The cholanigogram revealed a few filling defects.       The duct was swept from the hilum using a biliary extraction balloon, clots and a small        amount of sludge were swept from the duct until the duct was completely cleared.       A final occlusion cholangiogram revealed an accessory duct (Duct of Luschka) leak was seen.        There were no filling defects within the duct. The cystic duct stump was intact. An 8 mm x 8        cm fully covered metal stent was placed, position was good. Bile was seen draining from the       stent.                                                                                                        Impression:          - Clots were swept from the duct, no active bleeding was seen.                       - Accesory bile duct (Duct of Luschka) leak seen. 8x80mm FCSEMS was placed.  Recommendation:      - Repeat ERCP in 2-3 months for stent removal and evaluation of leak                        resolution                       - Advance diet as tolerated.                       - Pain control PRN.                       - Consider abdominal collection drainage.                       - Return patient to [facility] for ongoing care.    Attending Participation:       I was present and participated during the entire procedure, including non-key portions.                                                                                                          ___________________  Alina Lane MD  4/21/2025 5:34:05 PM    < end of copied text >

## 2025-04-22 NOTE — PROVIDER CONTACT NOTE (OTHER) - REASON
C/o severe abdominal pain
pt with /87
Pt states he has severe 10 out of 10 upper abdominal pain radiating to the right shoulder. Pt states it is difficult to breathe and pain is worse with movement.

## 2025-04-22 NOTE — PROVIDER CONTACT NOTE (OTHER) - ACTION/TREATMENT ORDERED:
Terra Napier MD notified and aware. Dr Rogers assessed patient at bedside. MD ordered stat add on blood work, stat ekg, and changed pain medication orders for pain management. POC continues.
EKG performed and read by MD, IV hydralazine ordered and administered
Provider notified. Give 0.5mg Dilaudid now

## 2025-04-22 NOTE — PROGRESS NOTE ADULT - SUBJECTIVE AND OBJECTIVE BOX
SURGERY DAILY PROGRESS NOTE:     Overnight Events: No acute events overnight.    SUBJECTIVE: Patient seen and evaluated on AM rounds. Pt is resting in bed with epigastric pain. Patient states he hasn't had a BM in 8 days. Tolerating CLD.     OBJECTIVE:  Vital Signs Last 24 Hrs  T(C): 36.7 (22 Apr 2025 09:10), Max: 37.1 (21 Apr 2025 21:15)  T(F): 98.1 (22 Apr 2025 09:10), Max: 98.7 (21 Apr 2025 21:15)  HR: 84 (22 Apr 2025 09:10) (66 - 85)  BP: 168/90 (22 Apr 2025 10:01) (107/57 - 208/87)  BP(mean): 92 (21 Apr 2025 16:05) (92 - 92)  RR: 18 (22 Apr 2025 09:10) (15 - 18)  SpO2: 94% (22 Apr 2025 09:10) (93% - 96%)    Parameters below as of 22 Apr 2025 09:10  Patient On (Oxygen Delivery Method): room air    I&O's Detail    21 Apr 2025 07:01  -  22 Apr 2025 07:00  --------------------------------------------------------  IN:    Lactated Ringers: 1200 mL    Oral Fluid: 480 mL  Total IN: 1680 mL    OUT:    Voided (mL): 800 mL  Total OUT: 800 mL    Total NET: 880 mL    22 Apr 2025 07:01  -  22 Apr 2025 12:36  --------------------------------------------------------  IN:  Total IN: 0 mL    OUT:    Voided (mL): 400 mL  Total OUT: 400 mL    Total NET: -400 mL      Daily Height in cm: 175.26 (21 Apr 2025 16:05)    Daily     LABS:                        10.7   8.30  )-----------( 161      ( 22 Apr 2025 07:05 )             31.6     04-22    136  |  100  |  14  ----------------------------<  121[H]  3.9   |  22  |  0.79    Ca    9.0      22 Apr 2025 07:05  Phos  1.9     04-22  Mg     2.3     04-22    TPro  6.5  /  Alb  3.7  /  TBili  2.9[H]  /  DBili  1.2[H]  /  AST  77[H]  /  ALT  153[H]  /  AlkPhos  144[H]  04-22    Urinalysis Basic - ( 22 Apr 2025 07:05 )    Color: x / Appearance: x / SG: x / pH: x  Gluc: 121 mg/dL / Ketone: x  / Bili: x / Urobili: x   Blood: x / Protein: x / Nitrite: x   Leuk Esterase: x / RBC: x / WBC x   Sq Epi: x / Non Sq Epi: x / Bacteria: x    PHYSICAL EXAM:  General: NAD, resting comfortably in bed  Pulmonary: Nonlabored breathing, no respiratory distress  Cardiovascular: Regular Rate  Abdominal: soft, appropriately tender at incision sites, steri's with strikethrough, non-distended   Extremities: Non edematous    SURGERY DAILY PROGRESS NOTE:     Overnight Events: /87 with chest pain, EKG ordered and hydralazine given.     SUBJECTIVE: Patient seen and evaluated on AM rounds. Pt is resting in bed with epigastric pain. Patient states he hasn't had a BM in 8 days. Tolerating CLD.     OBJECTIVE:  Vital Signs Last 24 Hrs  T(C): 36.7 (22 Apr 2025 09:10), Max: 37.1 (21 Apr 2025 21:15)  T(F): 98.1 (22 Apr 2025 09:10), Max: 98.7 (21 Apr 2025 21:15)  HR: 84 (22 Apr 2025 09:10) (66 - 85)  BP: 168/90 (22 Apr 2025 10:01) (107/57 - 208/87)  BP(mean): 92 (21 Apr 2025 16:05) (92 - 92)  RR: 18 (22 Apr 2025 09:10) (15 - 18)  SpO2: 94% (22 Apr 2025 09:10) (93% - 96%)    Parameters below as of 22 Apr 2025 09:10  Patient On (Oxygen Delivery Method): room air    I&O's Detail    21 Apr 2025 07:01  -  22 Apr 2025 07:00  --------------------------------------------------------  IN:    Lactated Ringers: 1200 mL    Oral Fluid: 480 mL  Total IN: 1680 mL    OUT:    Voided (mL): 800 mL  Total OUT: 800 mL    Total NET: 880 mL    22 Apr 2025 07:01  -  22 Apr 2025 12:36  --------------------------------------------------------  IN:  Total IN: 0 mL    OUT:    Voided (mL): 400 mL  Total OUT: 400 mL    Total NET: -400 mL      Daily Height in cm: 175.26 (21 Apr 2025 16:05)    Daily     LABS:                        10.7   8.30  )-----------( 161      ( 22 Apr 2025 07:05 )             31.6     04-22    136  |  100  |  14  ----------------------------<  121[H]  3.9   |  22  |  0.79    Ca    9.0      22 Apr 2025 07:05  Phos  1.9     04-22  Mg     2.3     04-22    TPro  6.5  /  Alb  3.7  /  TBili  2.9[H]  /  DBili  1.2[H]  /  AST  77[H]  /  ALT  153[H]  /  AlkPhos  144[H]  04-22    Urinalysis Basic - ( 22 Apr 2025 07:05 )    Color: x / Appearance: x / SG: x / pH: x  Gluc: 121 mg/dL / Ketone: x  / Bili: x / Urobili: x   Blood: x / Protein: x / Nitrite: x   Leuk Esterase: x / RBC: x / WBC x   Sq Epi: x / Non Sq Epi: x / Bacteria: x    PHYSICAL EXAM:  General: NAD, resting comfortably in bed  Pulmonary: Nonlabored breathing, no respiratory distress  Cardiovascular: Regular Rate  Abdominal: soft, appropriately tender at incision sites, steri's with strikethrough, non-distended   Extremities: Non edematous

## 2025-04-22 NOTE — PROVIDER CONTACT NOTE (OTHER) - BACKGROUND
Pt admitted 04/17 for calculus of bile duct without obstruction, cholangitis, or cholecystitis.
pt s/p balloon sweep
MHx CAD s/p CABG 2023 on Plavix (last dose 6 days prior to arrival) with OSH scan s/o choledocholithiasis w/o cholecystitis s/p for EGD/ ERCP with GI on 4/18 and laparoscopic cholecystectomy on 4/18.

## 2025-04-22 NOTE — PROGRESS NOTE ADULT - ASSESSMENT
68yo M w/ PMHx CAD s/p CABG 2023 on Plavix (last dose 6 days prior to arrival) and hyperlipidemia who presents for post-prandial lower chest/epigastric abdominal pain that first started 2 months ago and has been occurring intermittently since then with acute worsening of the same pain this past Monday 4/14 prompting multiple outside hospital and clinic visits in the interim. Patient underwent at CT scan on 4/14 at OSH (details below) that reveals a 4.8mm distal CBD stone with CBD measuring 8mm above the stone.    #Abdominal Pain  #Choledocholithiasis with CBD Dilation s/p sphincterotomy and cholcystectomy  #CABG on Plavix (last dose 6 days prior to arrival)    P/w choledocholithiasis on imaging from OSH 4/14 (detailed above) with low suspicion for cholangitis. s/p ERCP 4/18 showing erosive gastritis and choledocholithiasis s/p complete removal with sphincterotomy and balloon dilation. After ERCP, pt went directly to OR for lap jac. With acute onset of severe abdominal pain over weekend and distension raised c/f post ERCP pancreatitis though lipase found to be normal and pancreas WNL on cross sectional imaging). Perforation also on ddx as patient found with moderate volume of hemoperitoneum with largest component in the left upper quadrant measuring about 14 x 8.5 cm. Called back again 4/21 as patient noted with TBili rising from around 1 on 4/20 to 8-10 on 4/21 AM. Concern for bile leak vs choledocholithiasis.    *Repeat ERCP 4/21, details above    Recommendations:  -trend CBC, CMP  -advance diet as tolerated  -analgesia as per primary team  -repeat ERCP in 2-3 mo for stent removal and bile leak evaluation  -advanced GI team to contact patient with above appointment  -JAYA as per primary team  -GI to sign off, please call back if any questions or concerns      All recommendations preliminary until note signed by service attending.    Thank you for involving us in the care of this patient. Please contact should any concern or questions arise.    Yevgeniy Musa MD   Gastroenterology/Hepatology Fellow PGY-6  Available on [employer] Teams 7am - 5pm  [phone]

## 2025-04-23 ENCOUNTER — TRANSCRIPTION ENCOUNTER (OUTPATIENT)
Age: 70
End: 2025-04-23

## 2025-04-23 VITALS
TEMPERATURE: 100 F | DIASTOLIC BLOOD PRESSURE: 73 MMHG | OXYGEN SATURATION: 94 % | RESPIRATION RATE: 18 BRPM | SYSTOLIC BLOOD PRESSURE: 135 MMHG | HEART RATE: 100 BPM

## 2025-04-23 LAB
ALBUMIN SERPL ELPH-MCNC: 3.4 G/DL — SIGNIFICANT CHANGE UP (ref 3.3–5)
ALP SERPL-CCNC: 105 U/L — SIGNIFICANT CHANGE UP (ref 40–120)
ALT FLD-CCNC: 86 U/L — HIGH (ref 10–45)
ANION GAP SERPL CALC-SCNC: 13 MMOL/L — SIGNIFICANT CHANGE UP (ref 5–17)
AST SERPL-CCNC: 30 U/L — SIGNIFICANT CHANGE UP (ref 10–40)
BILIRUB DIRECT SERPL-MCNC: 1 MG/DL — HIGH (ref 0–0.3)
BILIRUB INDIRECT FLD-MCNC: 1.5 MG/DL — HIGH (ref 0.2–1)
BILIRUB SERPL-MCNC: 2.5 MG/DL — HIGH (ref 0.2–1.2)
BUN SERPL-MCNC: 12 MG/DL — SIGNIFICANT CHANGE UP (ref 7–23)
CALCIUM SERPL-MCNC: 8.6 MG/DL — SIGNIFICANT CHANGE UP (ref 8.4–10.5)
CHLORIDE SERPL-SCNC: 103 MMOL/L — SIGNIFICANT CHANGE UP (ref 96–108)
CO2 SERPL-SCNC: 21 MMOL/L — LOW (ref 22–31)
CREAT SERPL-MCNC: 0.9 MG/DL — SIGNIFICANT CHANGE UP (ref 0.5–1.3)
EGFR: 93 ML/MIN/1.73M2 — SIGNIFICANT CHANGE UP
EGFR: 93 ML/MIN/1.73M2 — SIGNIFICANT CHANGE UP
GLUCOSE SERPL-MCNC: 96 MG/DL — SIGNIFICANT CHANGE UP (ref 70–99)
HCT VFR BLD CALC: 29.2 % — LOW (ref 39–50)
HGB BLD-MCNC: 9.7 G/DL — LOW (ref 13–17)
MAGNESIUM SERPL-MCNC: 1.9 MG/DL — SIGNIFICANT CHANGE UP (ref 1.6–2.6)
MCHC RBC-ENTMCNC: 30.2 PG — SIGNIFICANT CHANGE UP (ref 27–34)
MCHC RBC-ENTMCNC: 33.2 G/DL — SIGNIFICANT CHANGE UP (ref 32–36)
MCV RBC AUTO: 91 FL — SIGNIFICANT CHANGE UP (ref 80–100)
NRBC BLD AUTO-RTO: 0 /100 WBCS — SIGNIFICANT CHANGE UP (ref 0–0)
PHOSPHATE SERPL-MCNC: 2.5 MG/DL — SIGNIFICANT CHANGE UP (ref 2.5–4.5)
PLATELET # BLD AUTO: 164 K/UL — SIGNIFICANT CHANGE UP (ref 150–400)
POTASSIUM SERPL-MCNC: 4.2 MMOL/L — SIGNIFICANT CHANGE UP (ref 3.5–5.3)
POTASSIUM SERPL-SCNC: 4.2 MMOL/L — SIGNIFICANT CHANGE UP (ref 3.5–5.3)
PROT SERPL-MCNC: 6 G/DL — SIGNIFICANT CHANGE UP (ref 6–8.3)
RBC # BLD: 3.21 M/UL — LOW (ref 4.2–5.8)
RBC # FLD: 13.2 % — SIGNIFICANT CHANGE UP (ref 10.3–14.5)
SODIUM SERPL-SCNC: 137 MMOL/L — SIGNIFICANT CHANGE UP (ref 135–145)
WBC # BLD: 8.61 K/UL — SIGNIFICANT CHANGE UP (ref 3.8–10.5)
WBC # FLD AUTO: 8.61 K/UL — SIGNIFICANT CHANGE UP (ref 3.8–10.5)

## 2025-04-23 PROCEDURE — 83690 ASSAY OF LIPASE: CPT

## 2025-04-23 PROCEDURE — C1769: CPT

## 2025-04-23 PROCEDURE — 93005 ELECTROCARDIOGRAM TRACING: CPT

## 2025-04-23 PROCEDURE — 83605 ASSAY OF LACTIC ACID: CPT

## 2025-04-23 PROCEDURE — 84443 ASSAY THYROID STIM HORMONE: CPT

## 2025-04-23 PROCEDURE — 71045 X-RAY EXAM CHEST 1 VIEW: CPT

## 2025-04-23 PROCEDURE — C1889: CPT

## 2025-04-23 PROCEDURE — 86901 BLOOD TYPING SEROLOGIC RH(D): CPT

## 2025-04-23 PROCEDURE — 82803 BLOOD GASES ANY COMBINATION: CPT

## 2025-04-23 PROCEDURE — 74174 CTA ABD&PLVS W/CONTRAST: CPT | Mod: MC

## 2025-04-23 PROCEDURE — 78226 HEPATOBILIARY SYSTEM IMAGING: CPT | Mod: MC

## 2025-04-23 PROCEDURE — 73030 X-RAY EXAM OF SHOULDER: CPT

## 2025-04-23 PROCEDURE — A9537: CPT

## 2025-04-23 PROCEDURE — 85027 COMPLETE CBC AUTOMATED: CPT

## 2025-04-23 PROCEDURE — C9399: CPT

## 2025-04-23 PROCEDURE — 82150 ASSAY OF AMYLASE: CPT

## 2025-04-23 PROCEDURE — 74330 X-RAY BILE/PANC ENDOSCOPY: CPT

## 2025-04-23 PROCEDURE — 84100 ASSAY OF PHOSPHORUS: CPT

## 2025-04-23 PROCEDURE — 84132 ASSAY OF SERUM POTASSIUM: CPT

## 2025-04-23 PROCEDURE — 86850 RBC ANTIBODY SCREEN: CPT

## 2025-04-23 PROCEDURE — 81001 URINALYSIS AUTO W/SCOPE: CPT

## 2025-04-23 PROCEDURE — 84484 ASSAY OF TROPONIN QUANT: CPT

## 2025-04-23 PROCEDURE — 82248 BILIRUBIN DIRECT: CPT

## 2025-04-23 PROCEDURE — 85610 PROTHROMBIN TIME: CPT

## 2025-04-23 PROCEDURE — 88304 TISSUE EXAM BY PATHOLOGIST: CPT

## 2025-04-23 PROCEDURE — 86900 BLOOD TYPING SEROLOGIC ABO: CPT

## 2025-04-23 PROCEDURE — 80053 COMPREHEN METABOLIC PANEL: CPT

## 2025-04-23 PROCEDURE — 80048 BASIC METABOLIC PNL TOTAL CA: CPT

## 2025-04-23 PROCEDURE — 85018 HEMOGLOBIN: CPT

## 2025-04-23 PROCEDURE — 85730 THROMBOPLASTIN TIME PARTIAL: CPT

## 2025-04-23 PROCEDURE — 82947 ASSAY GLUCOSE BLOOD QUANT: CPT

## 2025-04-23 PROCEDURE — 83036 HEMOGLOBIN GLYCOSYLATED A1C: CPT

## 2025-04-23 PROCEDURE — C1874: CPT

## 2025-04-23 PROCEDURE — 99285 EMERGENCY DEPT VISIT HI MDM: CPT

## 2025-04-23 PROCEDURE — 85014 HEMATOCRIT: CPT

## 2025-04-23 PROCEDURE — 71275 CT ANGIOGRAPHY CHEST: CPT | Mod: MC

## 2025-04-23 PROCEDURE — 82553 CREATINE MB FRACTION: CPT

## 2025-04-23 PROCEDURE — 85025 COMPLETE CBC W/AUTO DIFF WBC: CPT

## 2025-04-23 PROCEDURE — 82550 ASSAY OF CK (CPK): CPT

## 2025-04-23 PROCEDURE — 36415 COLL VENOUS BLD VENIPUNCTURE: CPT

## 2025-04-23 PROCEDURE — 83735 ASSAY OF MAGNESIUM: CPT

## 2025-04-23 PROCEDURE — 82435 ASSAY OF BLOOD CHLORIDE: CPT

## 2025-04-23 PROCEDURE — 82330 ASSAY OF CALCIUM: CPT

## 2025-04-23 PROCEDURE — 84295 ASSAY OF SERUM SODIUM: CPT

## 2025-04-23 PROCEDURE — 88305 TISSUE EXAM BY PATHOLOGIST: CPT

## 2025-04-23 PROCEDURE — 80076 HEPATIC FUNCTION PANEL: CPT

## 2025-04-23 RX ORDER — SOD PHOS DI, MONO/K PHOS MONO 250 MG
1 TABLET ORAL ONCE
Refills: 0 | Status: COMPLETED | OUTPATIENT
Start: 2025-04-23 | End: 2025-04-23

## 2025-04-23 RX ORDER — TRAMADOL HYDROCHLORIDE 50 MG/1
1 TABLET, FILM COATED ORAL
Qty: 6 | Refills: 0
Start: 2025-04-23

## 2025-04-23 RX ORDER — POLYETHYLENE GLYCOL 3350 17 G/17G
17 POWDER, FOR SOLUTION ORAL
Qty: 0 | Refills: 0 | DISCHARGE
Start: 2025-04-23

## 2025-04-23 RX ADMIN — Medication 40 MILLIGRAM(S): at 05:49

## 2025-04-23 RX ADMIN — Medication 1 PACKET(S): at 11:53

## 2025-04-23 RX ADMIN — ENOXAPARIN SODIUM 40 MILLIGRAM(S): 100 INJECTION SUBCUTANEOUS at 05:50

## 2025-04-23 NOTE — PROGRESS NOTE ADULT - ASSESSMENT
68yo M w/ PMHx CAD s/p CABG 2023 on Plavix (last dose 6 days prior to arrival) with OSH scan s/o choledocholithiasis w/o cholecystitis s/p for EGD/ ERCP with GI on 4/18 and laparoscopic cholecystectomy on 4/18. Severe R shoulder pain this AM likely referred pain from diaphragm from insufflation, remains HDS. Now s/p stent placement by GI for Duct of Luschka leak    Plan:  - Diet: low fat   - Pain control as needed  - DVT ppx  - OOB / amb  - F/u AM labs  - hold Plavix 5d  - bowel regimen started   - Dc planning     Acute care surgery, pager#[phone]

## 2025-04-23 NOTE — PROGRESS NOTE ADULT - ASSESSMENT
68yo M w/ PMHx CAD s/p CABG 2023 on Plavix (last dose 6 days prior to arrival) and HLD who presents for post-prandial lower chest/epigastric abdominal pain that first started 2 months ago and has been occurring intermittently since then with acute worsening of the same pain this past Monday admitted for and EGD/ERCP tomorrow 4/18.

## 2025-04-23 NOTE — PROGRESS NOTE ADULT - PROBLEM/PLAN-1
ESOPHAGOGASTRODUODENOSCOPY    PROCEDURE: EGD    SEDATION: Monitored anesthesia care, check anesthesia records    ASA Class: 2    INDICATIONS:  GERD and globus sensation    CONSENT:  Informed consent was obtained for the procedure, including sedation after explaining the risks and benefits of the procedure  Risks including but not limited to bleeding, perforation, infection, and missed lesion  PREPARATION:   Telemetry, pulse oximetry, blood pressure were monitored throughout the procedure  Patient was identified by myself both verbally and by visual inspection of ID band  DESCRIPTION:   Patient was placed in the left lateral decubitus position and was sedated with the above medication  The gastroscope was introduced in to the oropharynx and the esophagus was intubated under direct visualization  Scope was passed down the esophagus up to 2nd part of the duodenum  A careful inspection was made as the gastroscope was withdrawn, including a retroflexed view of the stomach; findings and interventions are described below  FINDINGS:    #1  Esophagus- grade a esophagitis    #2  Stomach- gastritis, biopsies taken for H pylori  Small hiatal hernia on retroflexion    #3  Duodenum- normal duodenum         IMPRESSIONS:      Normal duodenum  Mild gastritis biopsied  Small hiatal hernia retroflexion  Mild esophagitis    RECOMMENDATIONS:     Discharge home  Resume regular diet  Ranitidine 150 mg twice daily, call if any worsening symptoms, given side effects with pantoprazole can try alternative PPI therapy  Follow up biopsy results  Call with any abdominal pain, bleeding, fevers    COMPLICATIONS:  None; patient tolerated the procedure well            DISPOSITION: PACU           CONDITION: Stable
DISPLAY PLAN FREE TEXT

## 2025-04-23 NOTE — PROGRESS NOTE ADULT - PROVIDER SPECIALTY LIST ADULT
Gastroenterology
Internal Medicine
Surgery
Surgery
Gastroenterology
Gastroenterology
Internal Medicine
Internal Medicine
Surgery
Internal Medicine
Surgery
Internal Medicine
Internal Medicine

## 2025-04-23 NOTE — PROGRESS NOTE ADULT - REASON FOR ADMISSION
for ERCP

## 2025-04-23 NOTE — PROGRESS NOTE ADULT - PROBLEM SELECTOR PLAN 2
likely secondary to biliary obstruction   s/p ERCP and lap jac  GI help appreciated  continue to hold clopidogrel till cleared by surgery  right shoulder pain likely referred pain from air under diaphragm post surgery   management per surgery
resolved  management per surgery  likely discharge planning
likely secondary to biliary obstruction   s/p ERCP and lap jac  GI help appreciated  continue to hold clopidogrel till surgery OK with resuming

## 2025-04-23 NOTE — PROGRESS NOTE ADULT - SUBJECTIVE AND OBJECTIVE BOX
Patient is a 69y old  Male who presents with a chief complaint of for ERCP (23 Apr 2025 09:58)      DATE OF SERVICE: 04-23-25 @ 15:45    SUBJECTIVE / OVERNIGHT EVENTS: overnight events noted    ROS:  Resp: No cough no sputum production  CVS: No chest pain no palpitations no orthopnea  GI: no N/V/D  'I feel fine'   + BM yesterday and today  no abdominal pain      MEDICATIONS  (STANDING):  acetaminophen   IVPB .. 1000 milliGRAM(s) IV Intermittent every 6 hours  bisacodyl Suppository 10 milliGRAM(s) Rectal once  enoxaparin Injectable 40 milliGRAM(s) SubCutaneous every 24 hours  lactated ringers. 1000 milliLiter(s) (100 mL/Hr) IV Continuous <Continuous>  lidocaine   4% Patch 1 Patch Transdermal every 24 hours  melatonin 5 milliGRAM(s) Oral at bedtime  pantoprazole    Tablet 40 milliGRAM(s) Oral before breakfast  polyethylene glycol 3350 17 Gram(s) Oral daily    MEDICATIONS  (PRN):  hydrALAZINE Injectable 10 milliGRAM(s) IV Push every 6 hours PRN for SBP>180  HYDROmorphone  Injectable 1 milliGRAM(s) IV Push every 4 hours PRN for breakthrough pain  simethicone 80 milliGRAM(s) Chew every 6 hours PRN Gas  traMADol 25 milliGRAM(s) Oral every 4 hours PRN Moderate Pain (4 - 6)  traMADol 50 milliGRAM(s) Oral every 6 hours PRN Severe Pain (7 - 10)        CAPILLARY BLOOD GLUCOSE        I&O's Summary    22 Apr 2025 07:01  -  23 Apr 2025 07:00  --------------------------------------------------------  IN: 740 mL / OUT: 1000 mL / NET: -260 mL    23 Apr 2025 07:01  -  23 Apr 2025 15:45  --------------------------------------------------------  IN: 240 mL / OUT: 0 mL / NET: 240 mL        Vital Signs Last 24 Hrs  T(C): 37.6 (23 Apr 2025 09:21), Max: 37.6 (23 Apr 2025 09:21)  T(F): 99.7 (23 Apr 2025 09:21), Max: 99.7 (23 Apr 2025 09:21)  HR: 100 (23 Apr 2025 09:21) (82 - 100)  BP: 135/73 (23 Apr 2025 09:21) (135/73 - 160/82)  BP(mean): --  RR: 18 (23 Apr 2025 09:21) (18 - 18)  SpO2: 94% (23 Apr 2025 09:21) (91% - 95%)    PHYSICAL EXAM:  CHEST/LUNG: clear  HEART: S1 S2; no murmurs   ABDOMEN: Soft, Nontender  EXTREMITIES:   no edema  NEUROLOGY: AO x 3 non-focal  SKIN: No rashes or lesions    LABS:                        9.7    8.61  )-----------( 164      ( 23 Apr 2025 06:27 )             29.2     04-23    137  |  103  |  12  ----------------------------<  96  4.2   |  21[L]  |  0.90    Ca    8.6      23 Apr 2025 06:28  Phos  2.5     04-23  Mg     1.9     04-23    TPro  6.0  /  Alb  3.4  /  TBili  2.5[H]  /  DBili  1.0[H]  /  AST  30  /  ALT  86[H]  /  AlkPhos  105  04-23          Urinalysis Basic - ( 23 Apr 2025 06:28 )    Color: x / Appearance: x / SG: x / pH: x  Gluc: 96 mg/dL / Ketone: x  / Bili: x / Urobili: x   Blood: x / Protein: x / Nitrite: x   Leuk Esterase: x / RBC: x / WBC x   Sq Epi: x / Non Sq Epi: x / Bacteria: x          All consultant(s) notes reviewed and care discussed with other providers        Contact Number, Dr Damon [phone]

## 2025-04-23 NOTE — PROGRESS NOTE ADULT - SUBJECTIVE AND OBJECTIVE BOX
SURGERY DAILY PROGRESS NOTE:     Overnight Events: No acute events overnight.     SUBJECTIVE: Patient seen and evaluated on AM rounds. Pt is resting in bed with no complaints. Tolerating CLD, didn't try solids 2/2 to nausea.    OBJECTIVE:  Vital Signs Last 24 Hrs  T(C): 37.6 (23 Apr 2025 09:21), Max: 37.6 (23 Apr 2025 09:21)  T(F): 99.7 (23 Apr 2025 09:21), Max: 99.7 (23 Apr 2025 09:21)  HR: 100 (23 Apr 2025 09:21) (79 - 100)  BP: 135/73 (23 Apr 2025 09:21) (135/73 - 168/90)  BP(mean): --  RR: 18 (23 Apr 2025 09:21) (18 - 18)  SpO2: 94% (23 Apr 2025 09:21) (91% - 95%)    Parameters below as of 23 Apr 2025 09:21  Patient On (Oxygen Delivery Method): room air      I&O's Detail    22 Apr 2025 07:01  -  23 Apr 2025 07:00  --------------------------------------------------------  IN:    Oral Fluid: 740 mL  Total IN: 740 mL    OUT:    Voided (mL): 1000 mL  Total OUT: 1000 mL    Total NET: -260 mL    23 Apr 2025 07:01  -  23 Apr 2025 09:59  --------------------------------------------------------  IN:    Oral Fluid: 240 mL  Total IN: 240 mL    OUT:  Total OUT: 0 mL    Total NET: 240 mL    Daily     Daily     LABS:                        9.7    8.61  )-----------( 164      ( 23 Apr 2025 06:27 )             29.2     04-23    137  |  103  |  12  ----------------------------<  96  4.2   |  21[L]  |  0.90    Ca    8.6      23 Apr 2025 06:28  Phos  2.5     04-23  Mg     1.9     04-23    TPro  6.0  /  Alb  3.4  /  TBili  2.5[H]  /  DBili  1.0[H]  /  AST  30  /  ALT  86[H]  /  AlkPhos  105  04-23    Urinalysis Basic - ( 23 Apr 2025 06:28 )    Color: x / Appearance: x / SG: x / pH: x  Gluc: 96 mg/dL / Ketone: x  / Bili: x / Urobili: x   Blood: x / Protein: x / Nitrite: x   Leuk Esterase: x / RBC: x / WBC x   Sq Epi: x / Non Sq Epi: x / Bacteria: x    PHYSICAL EXAM:  General: NAD, resting comfortably in bed  Pulmonary: Nonlabored breathing, no respiratory distress  Cardiovascular: Regular Rate  Abdominal: soft, appropriately tender at incision sites, steri's with strikethrough, non-distended   Extremities: Non edematous

## 2025-04-23 NOTE — PROGRESS NOTE ADULT - PROBLEM SELECTOR PLAN 4
chest pain free  EKG no acute changes  no intervention at this time
chest pain free  outpatient follow up cardiology
chest pain free  EKG no acute changes  no intervention at this time

## 2025-04-23 NOTE — PROGRESS NOTE ADULT - PROBLEM SELECTOR PLAN 1
uncontrolled  s/p hydralazine  on the ernst side  avoid further labetalol   valsartan 160 daily stat dose now  consider adding hydralazine as condition improves
much improved  received valsartan x 1 dose  now only on PRN hydralazine  continue to monitor  avoid further labetalol  secondary to ernst on admission
much improved  continue metoprolol 25 BID

## 2025-04-23 NOTE — PROGRESS NOTE ADULT - PROBLEM SELECTOR PLAN 3
resolved   continue po pantoprazole

## 2025-04-28 ENCOUNTER — NON-APPOINTMENT (OUTPATIENT)
Age: 70
End: 2025-04-28

## 2025-04-28 ENCOUNTER — APPOINTMENT (OUTPATIENT)
Dept: RHEUMATOLOGY | Facility: CLINIC | Age: 70
End: 2025-04-28
Payer: MEDICARE

## 2025-04-28 VITALS
DIASTOLIC BLOOD PRESSURE: 64 MMHG | WEIGHT: 210 LBS | HEIGHT: 69 IN | BODY MASS INDEX: 31.1 KG/M2 | SYSTOLIC BLOOD PRESSURE: 106 MMHG

## 2025-04-28 DIAGNOSIS — R79.89 OTHER SPECIFIED ABNORMAL FINDINGS OF BLOOD CHEMISTRY: ICD-10-CM

## 2025-04-28 DIAGNOSIS — M10.079 IDIOPATHIC GOUT, UNSPECIFIED ANKLE AND FOOT: ICD-10-CM

## 2025-04-28 DIAGNOSIS — M10.9 GOUT, UNSPECIFIED: ICD-10-CM

## 2025-04-28 PROBLEM — I25.10 ATHEROSCLEROTIC HEART DISEASE OF NATIVE CORONARY ARTERY WITHOUT ANGINA PECTORIS: Chronic | Status: ACTIVE | Noted: 2025-04-17

## 2025-04-28 PROCEDURE — 99205 OFFICE O/P NEW HI 60 MIN: CPT

## 2025-04-28 PROCEDURE — G2211 COMPLEX E/M VISIT ADD ON: CPT

## 2025-04-28 RX ORDER — COLCHICINE 0.6 MG/1
0.6 TABLET ORAL DAILY
Qty: 90 | Refills: 0 | Status: ACTIVE | COMMUNITY
Start: 2025-04-28 | End: 1900-01-01

## 2025-05-01 ENCOUNTER — INPATIENT (INPATIENT)
Facility: HOSPITAL | Age: 70
LOS: 3 days | Discharge: HOME HEALTH | DRG: 392 | End: 2025-05-05
Attending: STUDENT IN AN ORGANIZED HEALTH CARE EDUCATION/TRAINING PROGRAM | Admitting: STUDENT IN AN ORGANIZED HEALTH CARE EDUCATION/TRAINING PROGRAM
Payer: MEDICARE

## 2025-05-01 VITALS
HEIGHT: 69 IN | OXYGEN SATURATION: 98 % | WEIGHT: 210.1 LBS | RESPIRATION RATE: 20 BRPM | TEMPERATURE: 100 F | SYSTOLIC BLOOD PRESSURE: 112 MMHG | DIASTOLIC BLOOD PRESSURE: 74 MMHG | HEART RATE: 114 BPM

## 2025-05-01 DIAGNOSIS — Z95.1 PRESENCE OF AORTOCORONARY BYPASS GRAFT: Chronic | ICD-10-CM

## 2025-05-01 DIAGNOSIS — Z98.890 OTHER SPECIFIED POSTPROCEDURAL STATES: Chronic | ICD-10-CM

## 2025-05-01 DIAGNOSIS — R10.9 UNSPECIFIED ABDOMINAL PAIN: ICD-10-CM

## 2025-05-01 DIAGNOSIS — Z98.61 CORONARY ANGIOPLASTY STATUS: Chronic | ICD-10-CM

## 2025-05-01 LAB
ALBUMIN SERPL ELPH-MCNC: 3 G/DL — LOW (ref 3.3–5)
ALP SERPL-CCNC: 94 U/L — SIGNIFICANT CHANGE UP (ref 40–120)
ALT FLD-CCNC: 46 U/L — HIGH (ref 10–45)
ANION GAP SERPL CALC-SCNC: 13 MMOL/L — SIGNIFICANT CHANGE UP (ref 5–17)
APTT BLD: 28.8 SEC — SIGNIFICANT CHANGE UP (ref 26.1–36.8)
AST SERPL-CCNC: 28 U/L — SIGNIFICANT CHANGE UP (ref 10–40)
BASOPHILS # BLD AUTO: 0.2 K/UL — SIGNIFICANT CHANGE UP (ref 0–0.2)
BASOPHILS NFR BLD AUTO: 1.7 % — SIGNIFICANT CHANGE UP (ref 0–2)
BILIRUB DIRECT SERPL-MCNC: 0.6 MG/DL — HIGH (ref 0–0.3)
BILIRUB INDIRECT FLD-MCNC: 0.9 MG/DL — SIGNIFICANT CHANGE UP (ref 0.2–1)
BILIRUB SERPL-MCNC: 1.5 MG/DL — HIGH (ref 0.2–1.2)
BLD GP AB SCN SERPL QL: NEGATIVE — SIGNIFICANT CHANGE UP
BUN SERPL-MCNC: 14 MG/DL — SIGNIFICANT CHANGE UP (ref 7–23)
CALCIUM SERPL-MCNC: 8.6 MG/DL — SIGNIFICANT CHANGE UP (ref 8.4–10.5)
CHLORIDE SERPL-SCNC: 100 MMOL/L — SIGNIFICANT CHANGE UP (ref 96–108)
CO2 SERPL-SCNC: 20 MMOL/L — LOW (ref 22–31)
CREAT SERPL-MCNC: 0.89 MG/DL — SIGNIFICANT CHANGE UP (ref 0.5–1.3)
EGFR: 93 ML/MIN/1.73M2 — SIGNIFICANT CHANGE UP
EGFR: 93 ML/MIN/1.73M2 — SIGNIFICANT CHANGE UP
EOSINOPHIL # BLD AUTO: 0 K/UL — SIGNIFICANT CHANGE UP (ref 0–0.5)
EOSINOPHIL NFR BLD AUTO: 0 % — SIGNIFICANT CHANGE UP (ref 0–6)
GAS PNL BLDV: SIGNIFICANT CHANGE UP
GLUCOSE SERPL-MCNC: 106 MG/DL — HIGH (ref 70–99)
HCT VFR BLD CALC: 32.4 % — LOW (ref 39–50)
HGB BLD-MCNC: 11.3 G/DL — LOW (ref 13–17)
INR BLD: 1.5 RATIO — HIGH (ref 0.85–1.16)
LIDOCAIN IGE QN: 37 U/L — SIGNIFICANT CHANGE UP (ref 7–60)
LYMPHOCYTES # BLD AUTO: 0.6 K/UL — LOW (ref 1–3.3)
LYMPHOCYTES # BLD AUTO: 5.2 % — LOW (ref 13–44)
MANUAL SMEAR VERIFICATION: SIGNIFICANT CHANGE UP
MCHC RBC-ENTMCNC: 31.3 PG — SIGNIFICANT CHANGE UP (ref 27–34)
MCHC RBC-ENTMCNC: 34.9 G/DL — SIGNIFICANT CHANGE UP (ref 32–36)
MCV RBC AUTO: 89.8 FL — SIGNIFICANT CHANGE UP (ref 80–100)
MONOCYTES # BLD AUTO: 0.71 K/UL — SIGNIFICANT CHANGE UP (ref 0–0.9)
MONOCYTES NFR BLD AUTO: 6.1 % — SIGNIFICANT CHANGE UP (ref 2–14)
NEUTROPHILS # BLD AUTO: 10.1 K/UL — HIGH (ref 1.8–7.4)
NEUTROPHILS NFR BLD AUTO: 87 % — HIGH (ref 43–77)
PLAT MORPH BLD: NORMAL — SIGNIFICANT CHANGE UP
PLATELET # BLD AUTO: 463 K/UL — HIGH (ref 150–400)
POTASSIUM SERPL-MCNC: 4.2 MMOL/L — SIGNIFICANT CHANGE UP (ref 3.5–5.3)
POTASSIUM SERPL-SCNC: 4.2 MMOL/L — SIGNIFICANT CHANGE UP (ref 3.5–5.3)
PROCALCITONIN SERPL-MCNC: 0.36 NG/ML — HIGH (ref 0.02–0.1)
PROT SERPL-MCNC: 6.5 G/DL — SIGNIFICANT CHANGE UP (ref 6–8.3)
PROTHROM AB SERPL-ACNC: 17.2 SEC — HIGH (ref 9.9–13.4)
RBC # BLD: 3.61 M/UL — LOW (ref 4.2–5.8)
RBC # FLD: 13.2 % — SIGNIFICANT CHANGE UP (ref 10.3–14.5)
RBC BLD AUTO: SIGNIFICANT CHANGE UP
RH IG SCN BLD-IMP: POSITIVE — SIGNIFICANT CHANGE UP
SODIUM SERPL-SCNC: 133 MMOL/L — LOW (ref 135–145)
WBC # BLD: 11.61 K/UL — HIGH (ref 3.8–10.5)
WBC # FLD AUTO: 11.61 K/UL — HIGH (ref 3.8–10.5)

## 2025-05-01 PROCEDURE — 71275 CT ANGIOGRAPHY CHEST: CPT | Mod: 26

## 2025-05-01 PROCEDURE — 74177 CT ABD & PELVIS W/CONTRAST: CPT | Mod: 26

## 2025-05-01 PROCEDURE — 99285 EMERGENCY DEPT VISIT HI MDM: CPT

## 2025-05-01 RX ORDER — PIPERACILLIN-TAZO-DEXTROSE,ISO 2.25G/50ML
3.38 IV SOLUTION, PIGGYBACK PREMIX FROZEN(ML) INTRAVENOUS ONCE
Refills: 0 | Status: COMPLETED | OUTPATIENT
Start: 2025-05-01 | End: 2025-05-01

## 2025-05-01 RX ORDER — PIPERACILLIN-TAZO-DEXTROSE,ISO 2.25G/50ML
3.38 IV SOLUTION, PIGGYBACK PREMIX FROZEN(ML) INTRAVENOUS EVERY 8 HOURS
Refills: 0 | Status: DISCONTINUED | OUTPATIENT
Start: 2025-05-02 | End: 2025-05-02

## 2025-05-01 RX ORDER — ACETAMINOPHEN 500 MG/5ML
1000 LIQUID (ML) ORAL ONCE
Refills: 0 | Status: COMPLETED | OUTPATIENT
Start: 2025-05-01 | End: 2025-05-01

## 2025-05-01 RX ORDER — KETOROLAC TROMETHAMINE 30 MG/ML
15 INJECTION, SOLUTION INTRAMUSCULAR; INTRAVENOUS ONCE
Refills: 0 | Status: DISCONTINUED | OUTPATIENT
Start: 2025-05-01 | End: 2025-05-01

## 2025-05-01 RX ORDER — ACETAMINOPHEN 500 MG/5ML
1000 LIQUID (ML) ORAL EVERY 6 HOURS
Refills: 0 | Status: DISCONTINUED | OUTPATIENT
Start: 2025-05-01 | End: 2025-05-05

## 2025-05-01 RX ORDER — PIPERACILLIN-TAZO-DEXTROSE,ISO 2.25G/50ML
3.38 IV SOLUTION, PIGGYBACK PREMIX FROZEN(ML) INTRAVENOUS ONCE
Refills: 0 | Status: DISCONTINUED | OUTPATIENT
Start: 2025-05-02 | End: 2025-05-02

## 2025-05-01 RX ORDER — VANCOMYCIN HCL IN 5 % DEXTROSE 1.5G/250ML
1000 PLASTIC BAG, INJECTION (ML) INTRAVENOUS ONCE
Refills: 0 | Status: COMPLETED | OUTPATIENT
Start: 2025-05-01 | End: 2025-05-01

## 2025-05-01 RX ORDER — SODIUM CHLORIDE 9 G/1000ML
1000 INJECTION, SOLUTION INTRAVENOUS
Refills: 0 | Status: DISCONTINUED | OUTPATIENT
Start: 2025-05-01 | End: 2025-05-03

## 2025-05-01 RX ORDER — ROSUVASTATIN CALCIUM 20 MG/1
1 TABLET, FILM COATED ORAL
Refills: 0 | DISCHARGE

## 2025-05-01 RX ADMIN — Medication 200 GRAM(S): at 17:49

## 2025-05-01 RX ADMIN — Medication 1000 MILLILITER(S): at 20:54

## 2025-05-01 RX ADMIN — KETOROLAC TROMETHAMINE 15 MILLIGRAM(S): 30 INJECTION, SOLUTION INTRAMUSCULAR; INTRAVENOUS at 23:08

## 2025-05-01 RX ADMIN — Medication 250 MILLIGRAM(S): at 19:08

## 2025-05-01 RX ADMIN — Medication 400 MILLIGRAM(S): at 18:31

## 2025-05-01 RX ADMIN — Medication 1000 MILLILITER(S): at 18:31

## 2025-05-01 NOTE — ED PROVIDER NOTE - OBJECTIVE STATEMENT
69ym PMHx CAD s/p CABG 2023 on Plavix (last dose 6 days prior to arrival) and HLD,  s/p ERCP 4/18 found to have sphincterotomy and cholecystectomy for elevation in BR on outpatient labs. Patient was discharged on the 23rd within normal bilirubin after procedure in the past week noted swelling to his left big toe which his partner thought was gout and she brought him to her outpatient clinic we did blood work and the uric acid level was normal but the bilirubin was elevated to 2.1 and a leukocytosis of 17 CRP of 190.  Patient has been endorsing significant nausea nonbloody diarrhea and generalized weakness.  Also endorses right upper quadrant abdominal pain and pain with inspiration.  Denies fevers chest pain vomiting dysuria leg swelling

## 2025-05-01 NOTE — ED ADULT NURSE NOTE - OBJECTIVE STATEMENT
68 y/o M with PMH of ERCP and cholecystectomy, presents to the ED with complaints of abnormal lab results. wife at bedside, per wife, pt was found to have elevated WBC and bilirubin. Pt endorses RUQ abd pain, weakness, and nausea. Pt A&Ox3 gross neuro intact, no difficulty speaking in complete sentences, pulses x 4, bolton x4. iv 20g placed in LFA

## 2025-05-01 NOTE — H&P ADULT - HISTORY OF PRESENT ILLNESS
69M PMH gout, CAD s/p CABG 2023 (previously on Plavix, last dose 3 weeks ago), HLD, s/p ERCP and laparoscopic cholecystectomy for choledocholithiasis on 4/18, post-operative course complicated by biliary leak (CTAP and HIDA completed 4/20; CTAP showing hemoperitoneum in the LUQ, HIDA showing biliary leak) s/p cholangiogram with balloon sweep of clots, sludge and metal stent placement at [facility] for biliary leak on 4/21. Discharged on 4/23. Returns with generalized fatigue, low appetite, and low energy since being discharged home. Had concern for gout however uric acid level negative, however other outpatient levels significant for leukocytosis 17, Tbili 2.1 (Tbili at discharge on 4/23 was 2.5), . WBC 11.6.    In ED, afebrile, tachycardic to 114, SBP 110s.    CTAP with IV contrast and CTA chest showed No pulmonary embolism, however, interval development of a large 10.2 cm abscess in the cholecystectomy   bed.      PAST MEDICAL & SURGICAL HISTORY:  Hypertension      Hyperlipidemia      CAD (coronary artery disease)      Gout      CAD (coronary artery disease)      S/P arthroscopy of shoulder      History of percutaneous coronary intervention      Status post phlebectomy      S/P CABG (coronary artery bypass graft)        FAMILY HISTORY:  Family history of early CAD (Father)    No pertinent family history in first degree relatives    [] Family history not pertinent as reviewed with the patient and family    SOCIAL HISTORY:  ***    ALLERGIES: No Known Allergies      HOME MEDICATIONS: ***    CURRENT MEDICATIONS  MEDICATIONS (STANDING): piperacillin/tazobactam IVPB... 3.375 Gram(s) IV Intermittent once  sodium chloride 0.9% Bolus 1000 milliLiter(s) IV Bolus once  vancomycin  IVPB. 1000 milliGRAM(s) IV Intermittent once    MEDICATIONS (PRN):  --------------------------------------------------------------------------------------------    Vitals:   T(C): 37.5 (05-01-25 @ 15:55), Max: 37.5 (05-01-25 @ 15:55)  HR: 114 (05-01-25 @ 15:55) (114 - 114)  BP: 112/74 (05-01-25 @ 15:55) (112/74 - 112/74)  RR: 20 (05-01-25 @ 15:55) (20 - 20)  SpO2: 98% (05-01-25 @ 15:55) (98% - 98%)  CAPILLARY BLOOD GLUCOSE          Height (cm): 175.3 (05-01 @ 15:55)  Weight (kg): 95.3 (05-01 @ 15:55)  BMI (kg/m2): 31 (05-01 @ 15:55)  BSA (m2): 2.11 (05-01 @ 15:55)      PHYSICAL EXAM:  General: NAD, appears fatigued, pale  Neuro: Alert and answering questions appropriately   Cardio: No peripheral edema  Resp: Good effort, no signs of respiratory distress  GI/Abd: Soft, non-tender, non-distended, port sites healed nicely; reports "soreness" upon palpation to epigastric region, mildly in the RUQ  --------------------------------------------------------------------------------------------    LABS                --------------------------------------------------------------------------------------------    MICROBIOLOGY      --------------------------------------------------------------------------------------------    IMAGING

## 2025-05-01 NOTE — ED PROVIDER NOTE - CLINICAL SUMMARY MEDICAL DECISION MAKING FREE TEXT BOX
69ym PMHx CAD s/p CABG 2023 on Plavix (last dose 6 days prior to arrival) and HLD,  s/p ERCP 4/18 found to have sphincterotomy and cholecystectomy for elevation in BR on outpatient labs.  patient has been endorsing abdominal pain pleuritic chest pain with shortness of breath nausea no vomiting nonbloody diarrhea no fevers chest pain.  Outpatient labs reveal leukocytosis and elevation in bilirubin and elevation in CRP.  On exam patient does not appear encephalopathic no jaundice no scleral icterus abdomen soft tender right upper quadrant epigastric region pain worse with inspiration no CVA tenderness lungs clear to auscultation bilaterally skin warm well-perfused has what appears to be swelling of the first metatarsal is able to range no overlying cellulitis no lower extremity swelling or pain.  Will get a CTA to rule out PE given recent hospitalization and procedure with pleuritic chest pain of the low suspicion higher suspicion for cholangitis versus pancreatitis given recent ERCP will check a rectal temp.  Fever and tachycardia can be explained more likely from fracture rather than PE but will evaluate for both.  Surgery already saw patient at bedside. 69ym PMHx CAD s/p CABG 2023 on Plavix (last dose 6 days prior to arrival) and HLD,  s/p ERCP 4/18 found to have sphincterotomy and cholecystectomy for elevation in BR on outpatient labs.  patient has been endorsing abdominal pain pleuritic chest pain with shortness of breath nausea no vomiting nonbloody diarrhea no fevers chest pain.  Outpatient labs reveal leukocytosis and elevation in bilirubin and elevation in CRP.  On exam patient does not appear encephalopathic no jaundice no scleral icterus abdomen soft tender right upper quadrant epigastric region pain worse with inspiration no CVA tenderness lungs clear to auscultation bilaterally skin warm well-perfused has what appears to be swelling of the first metatarsal is able to range no overlying cellulitis no lower extremity swelling or pain.  Will get a CTA to rule out PE given recent hospitalization and procedure with pleuritic chest pain of the low suspicion higher suspicion for cholangitis versus pancreatitis given recent ERCP will check a rectal temp.  Fever and tachycardia can be explained more likely from fracture rather than PE but will evaluate for both.  Surgery already saw patient at bedside. REASSESS ZR

## 2025-05-01 NOTE — CONSULT NOTE ADULT - SUBJECTIVE AND OBJECTIVE BOX
GENERAL SURGERY CONSULT NOTE  --------------------------------------------------------------------------------------------  HPI:  69M PMH gout, CAD s/p CABG 2023 (previously on Plavix, last dose 3 weeks ago), HLD, s/p ERCP and laparoscopic cholecystectomy for choledocholithiasis on 4/18, post-operative course complicated by biliary leak (CTAP and HIDA completed 4/20; CTAP showing hemoperitoneum in the LUQ, HIDA showing biliary leak) s/p cholangiogram with balloon sweep of clots, sludge and metal stent placement at [facility] for biliary leak on 4/21. Discharged on 4/23. Returns with generalized fatigue, low appetite, and low energy since being discharged home. Had concern for gout however uric acid level negative, however other outpatient levels significant for leukocytosis 17, Tbili 2.1 (Tbili at discharge on 4/23 was 2.5), .     In ED, afebrile, tachycardic to 114, SBP 110s.    Labs pending  CTAP with IV contrast and CTA chest pending        PAST MEDICAL & SURGICAL HISTORY:  Hypertension      Hyperlipidemia      CAD (coronary artery disease)      Gout      CAD (coronary artery disease)      S/P arthroscopy of shoulder      History of percutaneous coronary intervention      Status post phlebectomy      S/P CABG (coronary artery bypass graft)        FAMILY HISTORY:  Family history of early CAD (Father)    No pertinent family history in first degree relatives    [] Family history not pertinent as reviewed with the patient and family    SOCIAL HISTORY:  ***    ALLERGIES: No Known Allergies      HOME MEDICATIONS: ***    CURRENT MEDICATIONS  MEDICATIONS (STANDING): piperacillin/tazobactam IVPB... 3.375 Gram(s) IV Intermittent once  sodium chloride 0.9% Bolus 1000 milliLiter(s) IV Bolus once  vancomycin  IVPB. 1000 milliGRAM(s) IV Intermittent once    MEDICATIONS (PRN):  --------------------------------------------------------------------------------------------    Vitals:   T(C): 37.5 (05-01-25 @ 15:55), Max: 37.5 (05-01-25 @ 15:55)  HR: 114 (05-01-25 @ 15:55) (114 - 114)  BP: 112/74 (05-01-25 @ 15:55) (112/74 - 112/74)  RR: 20 (05-01-25 @ 15:55) (20 - 20)  SpO2: 98% (05-01-25 @ 15:55) (98% - 98%)  CAPILLARY BLOOD GLUCOSE          Height (cm): 175.3 (05-01 @ 15:55)  Weight (kg): 95.3 (05-01 @ 15:55)  BMI (kg/m2): 31 (05-01 @ 15:55)  BSA (m2): 2.11 (05-01 @ 15:55)      PHYSICAL EXAM:  General: NAD, appears fatigued, pale  Neuro: Alert and answering questions appropriately   Cardio: No peripheral edema  Resp: Good effort, no signs of respiratory distress  GI/Abd: Soft, non-tender, non-distended, port sites healed nicely; reports "soreness" upon palpation to epigastric region, mildly in the RUQ  --------------------------------------------------------------------------------------------    LABS                --------------------------------------------------------------------------------------------    MICROBIOLOGY      --------------------------------------------------------------------------------------------    IMAGING      --------------------------------------------------------------------------------------------

## 2025-05-01 NOTE — ED ADULT NURSE NOTE - NSFALLHARMRISKINTERV_ED_ALL_ED

## 2025-05-01 NOTE — ED PROVIDER NOTE - CHIEF COMPLAINT
The patient is a 69y Male complaining of abnormal lab result. Acitretin Counseling:  I discussed with the patient the risks of acitretin including but not limited to hair loss, dry lips/skin/eyes, liver damage, hyperlipidemia, depression/suicidal ideation, photosensitivity.  Serious rare side effects can include but are not limited to pancreatitis, pseudotumor cerebri, bony changes, clot formation/stroke/heart attack.  Patient understands that alcohol is contraindicated since it can result in liver toxicity and significantly prolong the elimination of the drug by many years.

## 2025-05-01 NOTE — CONSULT NOTE ADULT - ASSESSMENT
69M PMH gout, CAD s/p CABG 2023 (previously on Plavix, last dose 3 weeks ago), HLD, s/p ERCP and laparoscopic cholecystectomy for choledocholithiasis on 4/18, post-operative course complicated by biliary leak (CTAP and HIDA completed 4/20; CTAP showing hemoperitoneum in the LUQ, HIDA showing biliary leak) s/p cholangiogram with balloon sweep of clots, sludge and metal stent placement at [facility] for biliary leak on 4/21. Discharged on 4/23. Returns with generalized fatigue, low appetite, and low energy since being discharged home. Had concern for gout however uric acid level negative, however other outpatient levels significant for leukocytosis 17, Tbili 2.1 (Tbili at discharge on 4/23 was 2.5), . In ED, afebrile, tachycardic to 114, SBP 110s.    PLAN:  - Repeat labs (including hepatic function panel)  - CTAP and CTA chest pending  - Further plans pending the above    Discussed with Dr. Barry    Trauma -[id number]

## 2025-05-01 NOTE — ED PROVIDER NOTE - PHYSICAL EXAMINATION
GENERAL: well appearing in no acute distress, non-toxic appearing  HEAD: normocephalic, atraumatic  CARDIAC: regular rate and rhythm, normal S1S2, no appreciable murmurs, 2+ pulses in UE/LE b/l  PULM: normal breath sounds, clear to ascultation bilaterally, no rales, rhonchi, wheezing  GI: ruq tenderness, epigastric tenderness   : no CVA tenderness b/l,  MSK: no peripheral edema, no calf tenderness b/l  SKIN: no jaundice, skin warm well perfused

## 2025-05-01 NOTE — ED PROVIDER NOTE - ATTENDING CONTRIBUTION TO CARE
LOOK AT mdm Adjacent Tissue Transfer Text: The defect edges were debeveled with a #15 scalpel blade.  Given the location of the defect and the proximity to free margins an adjacent tissue transfer was deemed most appropriate.  Using a sterile surgical marker, an appropriate flap was drawn incorporating the defect and placing the expected incisions within the relaxed skin tension lines where possible.    The area thus outlined was incised deep to adipose tissue with a #15 scalpel blade.  The skin margins were undermined to an appropriate distance in all directions utilizing iris scissors.

## 2025-05-01 NOTE — H&P ADULT - ATTENDING COMMENTS
68 yo M h/o gout, CAD s/p CABG 2023, recent admission with choledocholithiasis s/p ERCP and laparoscopic cholecystectomy 4/18. Post op course complicated by [facility] biliary leak s/p repeat ERCP 4/21 with sweep of duct and placement of metal stent. He was discharged home on 4/23. He returns today for generalized fatigue and poor appetite as well as outpatient findings of WBC 17 and T bili 2.1.      Abdomen soft, non distended, non tender. Well healed laparoscopic incision sites      WBC 11.6   Hb 11.3   INR 1.5   BUN/Cr    T bili 1.5   Lactate 2.1     CT chest/abd/pelvis no PE. Large 10.2cm abscess in cholecystectomy bed. Metallic CBD stent with intraluminal debris within stent. Ascites decreased from prior.      Plan for admission, zosyn, IR consult for percutaneous drainage. Patient and his wife updated, multiple questions answered.      I have seen and examined this patient with the resident housestaff. I have reviewed all labs, imaging and reports. I have participated in formulating the plan, and have read and agree with the history, ROS, exam, assessment and plan as stated above.      Total time spent in the care of this patient today (excluding critical care, teaching, & procedures): 40 minutes                   Over 50% of the total time was spent on counseling and coordination of care.     Savanna Barry MD   Trauma and Acute Care Surgery

## 2025-05-01 NOTE — H&P ADULT - ASSESSMENT
69M PMH gout, CAD s/p CABG 2023 (previously on Plavix, last dose 3 weeks ago), HLD, s/p ERCP and laparoscopic cholecystectomy for choledocholithiasis on 4/18, post-operative course complicated by biliary leak (CTAP and HIDA completed 4/20; CTAP showing hemoperitoneum in the LUQ, HIDA showing biliary leak) s/p cholangiogram with balloon sweep of clots, sludge and metal stent placement at [facility] for biliary leak on 4/21. Now presenting with 12 cm abscess in gallbladder bed.    Plan   Admit to Surgery under Dr. Barry   NPO/IVF  IV Zosyn   IR consult   holding chemical VTE   Pain control PRN      Discussed with Dr. Barry     ACS Surgery   [id number] 69M PMH gout, CAD s/p CABG 2023 (previously on Plavix, last dose 3 weeks ago), HLD, s/p ERCP and laparoscopic cholecystectomy for choledocholithiasis on 4/18, post-operative course complicated by biliary leak (CTAP and HIDA completed 4/20; CTAP showing hemoperitoneum in the LUQ, HIDA showing biliary leak) s/p cholangiogram with balloon sweep of clots, sludge and metal stent placement at [facility] for biliary leak on 4/21. Now presenting with 10 cm abscess in gallbladder bed.    Plan   Admit to Surgery under Dr. Barry   NPO/IVF  IV Zosyn   IR consult   holding chemical VTE   Pain control PRN      Discussed with Dr. Barry     ACS Surgery   [id number] 69M PMH gout, CAD s/p CABG 2023 (previously on Plavix, last dose 3 weeks ago), HLD, s/p ERCP and laparoscopic cholecystectomy for choledocholithiasis on 4/18, post-operative course complicated by biliary leak (CTAP and HIDA completed 4/20; CTAP showing hemoperitoneum in the LUQ, HIDA showing biliary leak) s/p cholangiogram with balloon sweep of clots, sludge and metal stent placement at [facility] for biliary leak on 4/21. Now presenting with 10 cm abscess in gallbladder bed.    Plan   Admit to Surgery under Dr. Barry   NPO/IVF  IV Zosyn   IR consult   holding chemical VTE   Pain control PRN  Patient not on any medications, last dose of plavix 3 wks ago      Discussed with Dr. Barry     ACS Surgery   [id number] 69M PMH gout, CAD s/p CABG 2023 (previously on Plavix, last dose 3 weeks ago), HLD, s/p ERCP and laparoscopic cholecystectomy for choledocholithiasis on 4/18, post-operative course complicated by biliary leak (CTAP and HIDA completed 4/20; CTAP showing hemoperitoneum in the LUQ, HIDA showing biliary leak) s/p cholangiogram with balloon sweep of clots, sludge and metal stent placement at [facility] for biliary leak on 4/21. Now presenting with 10 cm abscess in gallbladder bed.    Plan   Admit to Surgery under Dr. Barry   NPO/IVF  IV Zosyn   IR consult   holding chemical VTE   Pain control PRN  Patient not taking any medications, last dose of plavix 3 wks ago      Discussed with Dr. Barry     ACS Surgery   [id number]

## 2025-05-02 LAB
25(OH)D3 SERPL-MCNC: 17 NG/ML
ALBUMIN SERPL ELPH-MCNC: 3.6 G/DL
ALP BLD-CCNC: 108 U/L
ALT SERPL-CCNC: 39 U/L
ANION GAP SERPL CALC-SCNC: 13 MMOL/L — SIGNIFICANT CHANGE UP (ref 5–17)
ANION GAP SERPL CALC-SCNC: 18 MMOL/L
AST SERPL-CCNC: 32 U/L
BILIRUB SERPL-MCNC: 2.1 MG/DL
BUN SERPL-MCNC: 14 MG/DL
BUN SERPL-MCNC: 15 MG/DL — SIGNIFICANT CHANGE UP (ref 7–23)
CALCIUM SERPL-MCNC: 7.7 MG/DL — LOW (ref 8.4–10.5)
CALCIUM SERPL-MCNC: 8.2 MG/DL
CHLORIDE SERPL-SCNC: 100 MMOL/L
CHLORIDE SERPL-SCNC: 103 MMOL/L — SIGNIFICANT CHANGE UP (ref 96–108)
CO2 SERPL-SCNC: 19 MMOL/L
CO2 SERPL-SCNC: 19 MMOL/L — LOW (ref 22–31)
CREAT SERPL-MCNC: 0.9 MG/DL — SIGNIFICANT CHANGE UP (ref 0.5–1.3)
CREAT SERPL-MCNC: 0.98 MG/DL
CRP SERPL-MCNC: 192 MG/L
EGFR: 93 ML/MIN/1.73M2 — SIGNIFICANT CHANGE UP
EGFR: 93 ML/MIN/1.73M2 — SIGNIFICANT CHANGE UP
EGFRCR SERPLBLD CKD-EPI 2021: 84 ML/MIN/1.73M2
ERYTHROCYTE [SEDIMENTATION RATE] IN BLOOD BY WESTERGREN METHOD: 94 MM/HR
FOLATE SERPL-MCNC: 5.2 NG/ML
GLUCOSE SERPL-MCNC: 98 MG/DL — SIGNIFICANT CHANGE UP (ref 70–99)
HCT VFR BLD CALC: 28.3 % — LOW (ref 39–50)
HCT VFR BLD CALC: 33.7 %
HGB BLD-MCNC: 10.8 G/DL
HGB BLD-MCNC: 9.2 G/DL — LOW (ref 13–17)
MAGNESIUM SERPL-MCNC: 2 MG/DL — SIGNIFICANT CHANGE UP (ref 1.6–2.6)
MCHC RBC-ENTMCNC: 28.9 PG — SIGNIFICANT CHANGE UP (ref 27–34)
MCHC RBC-ENTMCNC: 30.1 PG
MCHC RBC-ENTMCNC: 32 G/DL
MCHC RBC-ENTMCNC: 32.5 G/DL — SIGNIFICANT CHANGE UP (ref 32–36)
MCV RBC AUTO: 89 FL — SIGNIFICANT CHANGE UP (ref 80–100)
MCV RBC AUTO: 93.9 FL
NRBC BLD AUTO-RTO: 0 /100 WBCS — SIGNIFICANT CHANGE UP (ref 0–0)
PHOSPHATE SERPL-MCNC: 2.5 MG/DL — SIGNIFICANT CHANGE UP (ref 2.5–4.5)
PLATELET # BLD AUTO: 361 K/UL — SIGNIFICANT CHANGE UP (ref 150–400)
PLATELET # BLD AUTO: 389 K/UL
POTASSIUM SERPL-MCNC: 4 MMOL/L — SIGNIFICANT CHANGE UP (ref 3.5–5.3)
POTASSIUM SERPL-SCNC: 3.7 MMOL/L
POTASSIUM SERPL-SCNC: 4 MMOL/L — SIGNIFICANT CHANGE UP (ref 3.5–5.3)
PROT SERPL-MCNC: 6.3 G/DL
RBC # BLD: 3.18 M/UL — LOW (ref 4.2–5.8)
RBC # BLD: 3.59 M/UL
RBC # FLD: 13.2 % — SIGNIFICANT CHANGE UP (ref 10.3–14.5)
RBC # FLD: 13.7 %
SODIUM SERPL-SCNC: 135 MMOL/L — SIGNIFICANT CHANGE UP (ref 135–145)
SODIUM SERPL-SCNC: 137 MMOL/L
URATE SERPL-MCNC: 3.4 MG/DL
VIT B12 SERPL-MCNC: 644 PG/ML
WBC # BLD: 9.1 K/UL — SIGNIFICANT CHANGE UP (ref 3.8–10.5)
WBC # FLD AUTO: 17.28 K/UL
WBC # FLD AUTO: 9.1 K/UL — SIGNIFICANT CHANGE UP (ref 3.8–10.5)

## 2025-05-02 PROCEDURE — 49406 IMAGE CATH FLUID PERI/RETRO: CPT

## 2025-05-02 RX ORDER — SODIUM PHOSPHATE,DIBASIC DIHYD
30 POWDER (GRAM) MISCELLANEOUS ONCE
Refills: 0 | Status: COMPLETED | OUTPATIENT
Start: 2025-05-02 | End: 2025-05-02

## 2025-05-02 RX ORDER — ONDANSETRON HCL/PF 4 MG/2 ML
4 VIAL (ML) INJECTION ONCE
Refills: 0 | Status: DISCONTINUED | OUTPATIENT
Start: 2025-05-02 | End: 2025-05-05

## 2025-05-02 RX ORDER — PIPERACILLIN-TAZO-DEXTROSE,ISO 2.25G/50ML
3.38 IV SOLUTION, PIGGYBACK PREMIX FROZEN(ML) INTRAVENOUS ONCE
Refills: 0 | Status: COMPLETED | OUTPATIENT
Start: 2025-05-02 | End: 2025-05-02

## 2025-05-02 RX ORDER — ENOXAPARIN SODIUM 100 MG/ML
40 INJECTION SUBCUTANEOUS EVERY 24 HOURS
Refills: 0 | Status: DISCONTINUED | OUTPATIENT
Start: 2025-05-02 | End: 2025-05-05

## 2025-05-02 RX ORDER — HYDROMORPHONE/SOD CHLOR,ISO/PF 2 MG/10 ML
0.5 SYRINGE (ML) INJECTION
Refills: 0 | Status: DISCONTINUED | OUTPATIENT
Start: 2025-05-02 | End: 2025-05-05

## 2025-05-02 RX ORDER — PIPERACILLIN-TAZO-DEXTROSE,ISO 2.25G/50ML
3.38 IV SOLUTION, PIGGYBACK PREMIX FROZEN(ML) INTRAVENOUS EVERY 8 HOURS
Refills: 0 | Status: DISCONTINUED | OUTPATIENT
Start: 2025-05-02 | End: 2025-05-05

## 2025-05-02 RX ADMIN — Medication 1000 MILLIGRAM(S): at 06:52

## 2025-05-02 RX ADMIN — Medication 0.5 MILLIGRAM(S): at 13:09

## 2025-05-02 RX ADMIN — Medication 25 GRAM(S): at 13:08

## 2025-05-02 RX ADMIN — Medication 400 MILLIGRAM(S): at 14:43

## 2025-05-02 RX ADMIN — Medication 0.5 MILLIGRAM(S): at 20:31

## 2025-05-02 RX ADMIN — Medication 0.5 MILLIGRAM(S): at 13:39

## 2025-05-02 RX ADMIN — Medication 25 GRAM(S): at 21:14

## 2025-05-02 RX ADMIN — SODIUM CHLORIDE 100 MILLILITER(S): 9 INJECTION, SOLUTION INTRAVENOUS at 01:25

## 2025-05-02 RX ADMIN — Medication 400 MILLIGRAM(S): at 06:22

## 2025-05-02 RX ADMIN — Medication 85 MILLIMOLE(S): at 10:44

## 2025-05-02 RX ADMIN — Medication 200 GRAM(S): at 01:26

## 2025-05-02 RX ADMIN — Medication 400 MILLIGRAM(S): at 21:57

## 2025-05-02 RX ADMIN — Medication 0.5 MILLIGRAM(S): at 20:01

## 2025-05-02 RX ADMIN — Medication 25 GRAM(S): at 05:19

## 2025-05-02 RX ADMIN — Medication 1000 MILLIGRAM(S): at 22:26

## 2025-05-02 NOTE — PATIENT PROFILE ADULT - FALL HARM RISK - HARM RISK INTERVENTIONS

## 2025-05-02 NOTE — PROCEDURE NOTE - PLAN
Drain to bulb suction  Flush with 5 cc NS BID  Monitor output  Follow-up culture  Rest as per surgery

## 2025-05-02 NOTE — PROVIDER CONTACT NOTE (OTHER) - BACKGROUND
Pt admitted 5/1 for decreased PO intake, fatigue, pt febrile in ED. Pt s/p lap jac with biliary leak.

## 2025-05-02 NOTE — PRE PROCEDURE NOTE - PRE PROCEDURE EVALUATION
Interventional Radiology    HPI: 69y Male s/p lap jac 4/18 c/b bile leak s/p stent placement and now with GB fossa collection requiring drainage.     Allergies: No Known Allergies    Medications (Abx/Cardiac/Anticoagulation/Blood Products)  piperacillin/tazobactam IVPB.: 200 mL/Hr IV Intermittent (05-02 @ 01:26)  piperacillin/tazobactam IVPB.-: 25 mL/Hr IV Intermittent (05-02 @ 05:19)  piperacillin/tazobactam IVPB...: 200 mL/Hr IV Intermittent (05-01 @ 17:49)  vancomycin  IVPB.: 250 mL/Hr IV Intermittent (05-01 @ 19:08)    Data:  175.3  95.3  T(C): 36.5  HR: 83  BP: 112/68  RR: 18  SpO2: 93%    Exam  General: No acute distress  Chest: Non labored breathing  Abdomen: Non-distended  Extremities: No swelling, warm    -WBC 9.10 / HgB 9.2 / Hct 28.3 / Plt 361  -Na 135 / Cl 103 / BUN 15 / Glucose 98  -K 4.0 / CO2 19 / Cr 0.90  -ALT -- / Alk Phos -- / T.Bili --  -INR1.50    Imaging: reviewed    Plan: 69y Male presents for gallbladder fossa collection drainage.    -Risks/Benefits/alternatives explained with the patient and/or healthcare proxy and witnessed informed consent obtained.

## 2025-05-02 NOTE — PROGRESS NOTE ADULT - ASSESSMENT
69M PMH gout, CAD s/p CABG 2023 (previously on Plavix, last dose 3 weeks ago), HLD, s/p ERCP and laparoscopic cholecystectomy for choledocholithiasis on 4/18, post-operative course complicated by biliary leak (CTAP and HIDA completed 4/20; CTAP showing hemoperitoneum in the LUQ, HIDA showing biliary leak) s/p cholangiogram with balloon sweep of clots, sludge and metal stent placement at [facility] for biliary leak on 4/21. Now presenting with 10 cm abscess in gallbladder bed.    - IR for drainage  - NPO/IVF   - pain control  - IV abx:  Zosyn  - labs    Acute care surgery, pager#[phone]  69M PMH gout, CAD s/p CABG 2023 (previously on Plavix, last dose 3 weeks ago), HLD, s/p ERCP and laparoscopic cholecystectomy for choledocholithiasis on 4/18, post-operative course complicated by biliary leak (CTAP and HIDA completed 4/21; CTAP showing abdominal collection in the LUQ, HIDA showing biliary leak) s/p cholangiogram with balloon sweep of clots, sludge and metal stent placement at [facility] for biliary leak on 4/21. Now presenting with 10 cm abscess in gallbladder bed.     - IR for drainage  - NPO/IVF   - pain control  - IV abx:  Zosyn  - labs    Acute care surgery, pager#[phone]

## 2025-05-02 NOTE — PRE-ANESTHESIA EVALUATION ADULT - NSANTHPMHFT_GEN_ALL_CORE
69M hx gout, CAD s/p CABG 2023, HLD, s/p cholecystectomy presenting with abdominal abscess. No significant hx of cv/pulm dz, Denies exertional CP/SOB. METS >4.

## 2025-05-02 NOTE — CONSULT NOTE ADULT - SUBJECTIVE AND OBJECTIVE BOX
Interventional Radiology    Evaluate for Procedure: GB fossa drainage    HPI: 69M PMH gout, CAD s/p CABG  (previously on Plavix, last dose 3 weeks ago), HLD, s/p ERCP and laparoscopic cholecystectomy for choledocholithiasis on , post-operative course complicated by biliary leak (CTAP and HIDA completed ; CTAP showing hemoperitoneum in the LUQ, HIDA showing biliary leak) s/p cholangiogram with balloon sweep of clots, sludge and metal stent placement at [facility] for biliary leak on . Discharged on . Returns with generalized fatigue, low appetite, and low energy since being discharged home. Had concern for gout however uric acid level negative, however other outpatient levels significant for leukocytosis 17, Tbili 2.1 (Tbili at discharge on  was 2.5), . WBC 11.6. In ED, afebrile, tachycardic to 114, SBP 110s.    CTAP with IV contrast and CTA chest showed No pulmonary embolism, however, interval development of a large 10.2 cm abscess in the cholecystectomy bed. IR consulted for drainage.     Allergies: No Known Allergies    Medications (Abx/Cardiac/Anticoagulation/Blood Products)  piperacillin/tazobactam IVPB.: 200 mL/Hr IV Intermittent ( @ 01:26)  piperacillin/tazobactam IVPB.-: 25 mL/Hr IV Intermittent ( @ 05:19)  piperacillin/tazobactam IVPB...: 200 mL/Hr IV Intermittent ( @ 17:49)  vancomycin  IVPB.: 250 mL/Hr IV Intermittent ( @ 19:08)    Data:  175.3  95.3  T(C): 37.9  HR: 85  BP: 140/67  RR: 18  SpO2: 94%    -WBC 9.10 / HgB 9.2 / Hct 28.3 / Plt 361  -Na 135 / Cl 103 / BUN 15 / Glucose 98  -K 4.0 / CO2 19 / Cr 0.90  -ALT -- / Alk Phos -- / T.Bili --  -INR 1.50 / PTT 28.8    Radiology: reviewed    Assessment/Plan: 69M PMH gout, CAD s/p CABG  (previously on Plavix, last dose 3 weeks ago), HLD, s/p ERCP and laparoscopic cholecystectomy for choledocholithiasis on , post-operative course complicated by biliary leak (CTAP and HIDA completed ; CTAP showing hemoperitoneum in the LUQ, HIDA showing biliary leak) s/p cholangiogram with balloon sweep of clots, sludge and metal stent placement at [facility] for biliary leak on . Now presenting with 10 cm abscess in gallbladder bed.    - case reviewed and approved for today  - please place IR procedure order under NP Morales  - STAT labs in AM (cbc,coags, bmp, T&S)  - hold   - NPO  - d/w primary team    Any questions or concerns regarding above please reach out to IR:   -Available on [employer] teams  -During working hours (7a-5p): call [phone]  -Emergent issues after 5pm: page: [phone]  -Non-emergent consults: Please place a [place] order "IR Consult" with an appropriate callback number  -Scheduling questions: [phone]  -Clinic/Outpatient [phone]      WALI Alvarado- BC  Available on teams

## 2025-05-02 NOTE — PRE-OP CHECKLIST - HAND OFF
Impression: OCT - OD: Good-no focal thinning; OS: Good-no focal thinning Plan: No focal thinning OU
Impression: Pachymetry - OD: WNL; OS: WNL Plan:
Unit RN to OR RN

## 2025-05-02 NOTE — PROGRESS NOTE ADULT - ATTENDING COMMENTS
Patient seen and examined and agree with above.   IR drainage today for infected biloma with 260 cc of purulent drainage.   Hemodynamically appropraite. Patient seen and examined and agree with above.   s/p ERCP and laparoscopic cholecystectomy for choledocholithiasis on 4/18, post-operative course complicated by biliary leak (CTAP and HIDA completed 4/21; IR drainage today for infected biloma with 260 cc of purulent drainage.   Hemodynamically appropraite.  Abdomen is soft nontender and nondistended; incisions are clean, dry and intact.   Labs with TB downtrending.   Will await cultures and continue antibiotics at this time.  Regular diet to be given.  Repeat labs in Am.

## 2025-05-02 NOTE — PROGRESS NOTE ADULT - SUBJECTIVE AND OBJECTIVE BOX
SURGERY DAILY PROGRESS NOTE:       Subjective / Overnight events:   c/o pain.  Minimal appetite.  Denies SOB/CP.      Objective:      MEDICATIONS  (STANDING):  lactated ringers. 1000 milliLiter(s) (100 mL/Hr) IV Continuous <Continuous>  piperacillin/tazobactam IVPB.. 3.375 Gram(s) IV Intermittent every 8 hours    MEDICATIONS  (PRN):  acetaminophen   IVPB .. 1000 milliGRAM(s) IV Intermittent every 6 hours PRN Mild Pain (1 - 3)      Vital Signs Last 24 Hrs  T(C): 36.5 (02 May 2025 10:15), Max: 39.2 (01 May 2025 17:10)  T(F): 97.7 (02 May 2025 10:15), Max: 102.5 (01 May 2025 17:10)  HR: 83 (02 May 2025 10:15) (78 - 114)  BP: 112/68 (02 May 2025 10:15) (108/62 - 158/67)  BP(mean): --  RR: 18 (02 May 2025 10:15) (16 - 20)  SpO2: 93% (02 May 2025 10:15) (93% - 98%)    Parameters below as of 02 May 2025 09:30  Patient On (Oxygen Delivery Method): room air        I&O's Detail    01 May 2025 07:01  -  02 May 2025 07:00  --------------------------------------------------------  IN:    Lactated Ringers: 500 mL  Total IN: 500 mL    OUT:    Voided (mL): 170 mL  Total OUT: 170 mL    Total NET: 330 mL      02 May 2025 07:01  -  02 May 2025 11:11  --------------------------------------------------------  IN:  Total IN: 0 mL    OUT:    Oral Fluid: 0 mL  Total OUT: 0 mL    Total NET: 0 mL          Daily Height in cm: 175.3 (02 May 2025 10:15)    Daily     LABS:                        9.2    9.10  )-----------( 361      ( 02 May 2025 07:16 )             28.3     05-02    135  |  103  |  15  ----------------------------<  98  4.0   |  19[L]  |  0.90    Ca    7.7[L]      02 May 2025 07:15  Phos  2.5     05-02  Mg     2.0     05-02    TPro  6.5  /  Alb  3.0[L]  /  TBili  1.5[H]  /  DBili  0.6[H]  /  AST  28  /  ALT  46[H]  /  AlkPhos  94  05-01    PT/INR - ( 01 May 2025 17:31 )   PT: 17.2 sec;   INR: 1.50 ratio         PTT - ( 01 May 2025 17:31 )  PTT:28.8 sec  Urinalysis Basic - ( 02 May 2025 07:15 )    Color: x / Appearance: x / SG: x / pH: x  Gluc: 98 mg/dL / Ketone: x  / Bili: x / Urobili: x   Blood: x / Protein: x / Nitrite: x   Leuk Esterase: x / RBC: x / WBC x   Sq Epi: x / Non Sq Epi: x / Bacteria: x            PHYSICAL EXAM:  General: NAD  Neuro: Alert and answering questions appropriately   Cardio: No peripheral edema  Resp: Good effort, no signs of respiratory distress  GI/Abd: Soft, non-tender, non-distended, port sites healed, mildly in the RUQ

## 2025-05-02 NOTE — CHART NOTE - NSCHARTNOTEFT_GEN_A_CORE
IR Post-Procedure Check    Patient seen and examined without complaints. Pain is controlled. Denies SOB/CP/N/V.     Vital Signs Last 24 Hrs  T(C): 37.6 (02 May 2025 15:28), Max: 39.2 (01 May 2025 17:10)  T(F): 99.7 (02 May 2025 15:28), Max: 102.5 (01 May 2025 17:10)  HR: 79 (02 May 2025 15:28) (68 - 97)  BP: 123/72 (02 May 2025 15:28) (108/62 - 158/67)  BP(mean): 106 (02 May 2025 14:35) (86 - 106)  RR: 20 (02 May 2025 15:28) (16 - 31)  SpO2: 95% (02 May 2025 15:28) (93% - 99%)    Parameters below as of 02 May 2025 15:28  Patient On (Oxygen Delivery Method): room air        I&O's Summary    01 May 2025 07:01  -  02 May 2025 07:00  --------------------------------------------------------  IN: 500 mL / OUT: 170 mL / NET: 330 mL    02 May 2025 07:01  -  02 May 2025 17:01  --------------------------------------------------------  IN: 0 mL / OUT: 280 mL / NET: -280 mL        Physical Exam  Gen: NAD, AAOx3  Pulm: No respiratory distress, no subcostal retractions  CV: RRR, no JVD  Abd: Soft, slightly tender, slightly distended, IR drain to SELMA suction with minimal output in bulb.   Extremities:  FROM, warm and well perfused, equal bilateral muscle strength      Assessment:   69M PMH gout, CAD s/p CABG 2023 (previously on Plavix, last dose 3 weeks ago), HLD, s/p ERCP and laparoscopic cholecystectomy for choledocholithiasis on 4/18, post-operative course complicated by biliary leak (CTAP and HIDA completed 4/20; CTAP showing hemoperitoneum in the LUQ, HIDA showing biliary leak) s/p cholangiogram with balloon sweep of clots, sludge and metal stent placement at [facility] for biliary leak on 4/21. Now presenting with 10 cm abscess in gallbladder bed. Now s/p IR drainage of gallbladder fossa collection.     - Resume diet  - IVF  - Resume DVT ppx   - Monitor drain output   - Pain control   - IV abx:  Zosyn      Acute care surgery, pager#[phone]

## 2025-05-03 LAB
ALBUMIN SERPL ELPH-MCNC: 2.6 G/DL — LOW (ref 3.3–5)
ALP SERPL-CCNC: 88 U/L — SIGNIFICANT CHANGE UP (ref 40–120)
ALT FLD-CCNC: 31 U/L — SIGNIFICANT CHANGE UP (ref 10–45)
ANION GAP SERPL CALC-SCNC: 12 MMOL/L — SIGNIFICANT CHANGE UP (ref 5–17)
AST SERPL-CCNC: 24 U/L — SIGNIFICANT CHANGE UP (ref 10–40)
BILIRUB DIRECT SERPL-MCNC: 0.4 MG/DL — HIGH (ref 0–0.3)
BILIRUB INDIRECT FLD-MCNC: 0.3 MG/DL — SIGNIFICANT CHANGE UP (ref 0.2–1)
BILIRUB SERPL-MCNC: 0.7 MG/DL — SIGNIFICANT CHANGE UP (ref 0.2–1.2)
BUN SERPL-MCNC: 10 MG/DL — SIGNIFICANT CHANGE UP (ref 7–23)
CALCIUM SERPL-MCNC: 7.9 MG/DL — LOW (ref 8.4–10.5)
CHLORIDE SERPL-SCNC: 103 MMOL/L — SIGNIFICANT CHANGE UP (ref 96–108)
CO2 SERPL-SCNC: 22 MMOL/L — SIGNIFICANT CHANGE UP (ref 22–31)
CREAT SERPL-MCNC: 0.9 MG/DL — SIGNIFICANT CHANGE UP (ref 0.5–1.3)
EGFR: 93 ML/MIN/1.73M2 — SIGNIFICANT CHANGE UP
EGFR: 93 ML/MIN/1.73M2 — SIGNIFICANT CHANGE UP
GLUCOSE SERPL-MCNC: 106 MG/DL — HIGH (ref 70–99)
GRAM STN FLD: ABNORMAL
HCT VFR BLD CALC: 30.6 % — LOW (ref 39–50)
HGB BLD-MCNC: 9.9 G/DL — LOW (ref 13–17)
MAGNESIUM SERPL-MCNC: 2.3 MG/DL — SIGNIFICANT CHANGE UP (ref 1.6–2.6)
MCHC RBC-ENTMCNC: 28.9 PG — SIGNIFICANT CHANGE UP (ref 27–34)
MCHC RBC-ENTMCNC: 32.4 G/DL — SIGNIFICANT CHANGE UP (ref 32–36)
MCV RBC AUTO: 89.5 FL — SIGNIFICANT CHANGE UP (ref 80–100)
NRBC BLD AUTO-RTO: 0 /100 WBCS — SIGNIFICANT CHANGE UP (ref 0–0)
PHOSPHATE SERPL-MCNC: 2.9 MG/DL — SIGNIFICANT CHANGE UP (ref 2.5–4.5)
PLATELET # BLD AUTO: 380 K/UL — SIGNIFICANT CHANGE UP (ref 150–400)
POTASSIUM SERPL-MCNC: 4.2 MMOL/L — SIGNIFICANT CHANGE UP (ref 3.5–5.3)
POTASSIUM SERPL-SCNC: 4.2 MMOL/L — SIGNIFICANT CHANGE UP (ref 3.5–5.3)
PROT SERPL-MCNC: 5.8 G/DL — LOW (ref 6–8.3)
RBC # BLD: 3.42 M/UL — LOW (ref 4.2–5.8)
RBC # FLD: 13.2 % — SIGNIFICANT CHANGE UP (ref 10.3–14.5)
SODIUM SERPL-SCNC: 137 MMOL/L — SIGNIFICANT CHANGE UP (ref 135–145)
SPECIMEN SOURCE: SIGNIFICANT CHANGE UP
WBC # BLD: 7.4 K/UL — SIGNIFICANT CHANGE UP (ref 3.8–10.5)
WBC # FLD AUTO: 7.4 K/UL — SIGNIFICANT CHANGE UP (ref 3.8–10.5)

## 2025-05-03 RX ORDER — SODIUM PHOSPHATE,DIBASIC DIHYD
30 POWDER (GRAM) MISCELLANEOUS ONCE
Refills: 0 | Status: COMPLETED | OUTPATIENT
Start: 2025-05-03 | End: 2025-05-03

## 2025-05-03 RX ORDER — MELATONIN 5 MG
3 TABLET ORAL AT BEDTIME
Refills: 0 | Status: DISCONTINUED | OUTPATIENT
Start: 2025-05-03 | End: 2025-05-05

## 2025-05-03 RX ADMIN — Medication 85 MILLIMOLE(S): at 13:26

## 2025-05-03 RX ADMIN — Medication 25 GRAM(S): at 05:30

## 2025-05-03 RX ADMIN — Medication 3 MILLIGRAM(S): at 22:02

## 2025-05-03 RX ADMIN — Medication 25 GRAM(S): at 13:26

## 2025-05-03 RX ADMIN — Medication 25 GRAM(S): at 21:40

## 2025-05-03 NOTE — PROVIDER CONTACT NOTE (CRITICAL VALUE NOTIFICATION) - NS PROVIDER READ BACK
abscess culture no polymorphonuclear cells seen, numerous gram rods with moderate gram negative rods and moderate gram positive cocci in pairs and chains with few gram positive rods/yes

## 2025-05-03 NOTE — PROVIDER CONTACT NOTE (CRITICAL VALUE NOTIFICATION) - ASSESSMENT
abscess culture no polymorphonuclear cells seen, numerous gram rods with moderate gram negative rods and moderate gram positive cocci in pairs and chains with few gram positive rods

## 2025-05-03 NOTE — PROGRESS NOTE ADULT - ASSESSMENT
69M PMH gout, CAD s/p CABG 2023 (previously on Plavix, last dose 3 weeks ago), HLD, s/p ERCP and laparoscopic cholecystectomy for choledocholithiasis on 4/18, post-operative course complicated by biliary leak (CTAP and HIDA completed 4/20; CTAP showing hemoperitoneum in the LUQ, HIDA showing biliary leak) s/p cholangiogram with balloon sweep of clots, sludge and metal stent placement at [facility] for biliary leak on 4/21. Now presenting with 10 cm abscess in gallbladder bed. PPD sp IR drainage of GB fossa abscess.    Plan   -Reg diet  -Zosyn  -Monitor drain output  -lovenox VTE ppx   -Pain control PRN  -hold plavix, last dose 3 wks ago    ACS  [id number]

## 2025-05-03 NOTE — PROGRESS NOTE ADULT - SUBJECTIVE AND OBJECTIVE BOX
Trauma Surgery Progress Note    SUBJECTIVE  The patient was seen and examined. No acute events overnight. Pain controlled, febrile to 100.4 ON.    OBJECTIVE  ___________________________________________________  VITAL SIGNS / I&O's   Vital Signs Last 24 Hrs  T(C): 36.8 (03 May 2025 09:11), Max: 38 (02 May 2025 21:51)  T(F): 98.2 (03 May 2025 09:11), Max: 100.4 (02 May 2025 21:51)  HR: 89 (03 May 2025 09:11) (68 - 89)  BP: 115/74 (03 May 2025 09:11) (112/68 - 157/74)  BP(mean): 106 (02 May 2025 14:35) (86 - 106)  RR: 18 (03 May 2025 09:11) (18 - 31)  SpO2: 93% (03 May 2025 09:11) (93% - 99%)    Parameters below as of 03 May 2025 09:11  Patient On (Oxygen Delivery Method): room air          02 May 2025 07:01  -  03 May 2025 07:00  --------------------------------------------------------  IN:    Lactated Ringers: 1200 mL    Oral Fluid: 240 mL  Total IN: 1440 mL    OUT:    Bulb (mL): 135 mL    Voided (mL): 1700 mL  Total OUT: 1835 mL    Total NET: -395 mL      03 May 2025 07:01  -  03 May 2025 10:28  --------------------------------------------------------  IN:    Oral Fluid: 250 mL  Total IN: 250 mL    OUT:  Total OUT: 0 mL    Total NET: 250 mL        ___________________________________________________  PHYSICAL EXAM    -- CONSTITUTIONAL: NAD, lying in bed  -- NEURO: Awake, alert  -- HEENT: NC/AT  -- PULM: Non-labored respirations, equal chest rise bilaterally  -- ABDOMEN: soft ntnd. IR drain x1 serous.  -- EXTREMITIES: Warm and well perfused  -- PSYCH: Affect normal, A&Ox3    ___________________________________________________  LABS                        9.9    7.40  )-----------( 380      ( 03 May 2025 07:06 )             30.6     03 May 2025 07:05    137    |  103    |  10     ----------------------------<  106    4.2     |  22     |  0.90     Ca    7.9        03 May 2025 07:05  Phos  2.9       03 May 2025 07:05  Mg     2.3       03 May 2025 07:05    TPro  5.8    /  Alb  2.6    /  TBili  0.7    /  DBili  0.4    /  AST  24     /  ALT  31     /  AlkPhos  88     03 May 2025 07:05    PT/INR - ( 01 May 2025 17:31 )   PT: 17.2 sec;   INR: 1.50 ratio         PTT - ( 01 May 2025 17:31 )  PTT:28.8 sec  CAPILLARY BLOOD GLUCOSE            Urinalysis Basic - ( 03 May 2025 07:05 )    Color: x / Appearance: x / SG: x / pH: x  Gluc: 106 mg/dL / Ketone: x  / Bili: x / Urobili: x   Blood: x / Protein: x / Nitrite: x   Leuk Esterase: x / RBC: x / WBC x   Sq Epi: x / Non Sq Epi: x / Bacteria: x      ___________________________________________________  MICRO  Recent Cultures:  Specimen Source: Abscess Gallbladder fossa collection, 05-02 @ 12:51; Results --; Gram Stain:   No polymorphonuclear cells seen per low power field  Numerous Gram Negative Rods per oil power field  Moderate Gram Positive Cocci in Pairs and Chains per oil power field  Few Gram Positive Rods per oil power field[!]; Organism: --  Specimen Source: Blood Blood-Peripheral, 05-01 @ 17:20; Results   No growth at 24 hours; Gram Stain: --; Organism: --  Specimen Source: Blood Blood-Peripheral, 05-01 @ 17:00; Results   No growth at 24 hours; Gram Stain: --; Organism: --    ___________________________________________________  MEDICATIONS  (STANDING):  enoxaparin Injectable 40 milliGRAM(s) SubCutaneous every 24 hours  piperacillin/tazobactam IVPB.. 3.375 Gram(s) IV Intermittent every 8 hours    MEDICATIONS  (PRN):  acetaminophen   IVPB .. 1000 milliGRAM(s) IV Intermittent every 6 hours PRN Mild Pain (1 - 3)  HYDROmorphone  Injectable 0.5 milliGRAM(s) IV Push every 10 minutes PRN Moderate Pain (4 - 6)  ondansetron Injectable 4 milliGRAM(s) IV Push once PRN Nausea and/or Vomiting

## 2025-05-04 LAB
ALBUMIN SERPL ELPH-MCNC: 2.9 G/DL — LOW (ref 3.3–5)
ALP SERPL-CCNC: 81 U/L — SIGNIFICANT CHANGE UP (ref 40–120)
ALT FLD-CCNC: 35 U/L — SIGNIFICANT CHANGE UP (ref 10–45)
ANION GAP SERPL CALC-SCNC: 11 MMOL/L — SIGNIFICANT CHANGE UP (ref 5–17)
AST SERPL-CCNC: 32 U/L — SIGNIFICANT CHANGE UP (ref 10–40)
BILIRUB DIRECT SERPL-MCNC: 0.4 MG/DL — HIGH (ref 0–0.3)
BILIRUB INDIRECT FLD-MCNC: 0.3 MG/DL — SIGNIFICANT CHANGE UP (ref 0.2–1)
BILIRUB SERPL-MCNC: 0.7 MG/DL — SIGNIFICANT CHANGE UP (ref 0.2–1.2)
BUN SERPL-MCNC: 7 MG/DL — SIGNIFICANT CHANGE UP (ref 7–23)
CALCIUM SERPL-MCNC: 8.4 MG/DL — SIGNIFICANT CHANGE UP (ref 8.4–10.5)
CHLORIDE SERPL-SCNC: 103 MMOL/L — SIGNIFICANT CHANGE UP (ref 96–108)
CO2 SERPL-SCNC: 22 MMOL/L — SIGNIFICANT CHANGE UP (ref 22–31)
CREAT SERPL-MCNC: 0.9 MG/DL — SIGNIFICANT CHANGE UP (ref 0.5–1.3)
CULTURE RESULTS: ABNORMAL
EGFR: 93 ML/MIN/1.73M2 — SIGNIFICANT CHANGE UP
EGFR: 93 ML/MIN/1.73M2 — SIGNIFICANT CHANGE UP
GLUCOSE SERPL-MCNC: 101 MG/DL — HIGH (ref 70–99)
HCT VFR BLD CALC: 32.1 % — LOW (ref 39–50)
HGB BLD-MCNC: 10.5 G/DL — LOW (ref 13–17)
HOMOCYSTEINE LEVEL: 10.5 UMOL/L
MAGNESIUM SERPL-MCNC: 2.4 MG/DL — SIGNIFICANT CHANGE UP (ref 1.6–2.6)
MCHC RBC-ENTMCNC: 29.2 PG — SIGNIFICANT CHANGE UP (ref 27–34)
MCHC RBC-ENTMCNC: 32.7 G/DL — SIGNIFICANT CHANGE UP (ref 32–36)
MCV RBC AUTO: 89.4 FL — SIGNIFICANT CHANGE UP (ref 80–100)
METHYLMALONATE SERPL-SCNC: 142 NMOL/L
NRBC BLD AUTO-RTO: 0 /100 WBCS — SIGNIFICANT CHANGE UP (ref 0–0)
PHOSPHATE SERPL-MCNC: 3 MG/DL — SIGNIFICANT CHANGE UP (ref 2.5–4.5)
PLATELET # BLD AUTO: 452 K/UL — HIGH (ref 150–400)
POTASSIUM SERPL-MCNC: 3.6 MMOL/L — SIGNIFICANT CHANGE UP (ref 3.5–5.3)
POTASSIUM SERPL-SCNC: 3.6 MMOL/L — SIGNIFICANT CHANGE UP (ref 3.5–5.3)
PROT SERPL-MCNC: 6.4 G/DL — SIGNIFICANT CHANGE UP (ref 6–8.3)
RBC # BLD: 3.59 M/UL — LOW (ref 4.2–5.8)
RBC # FLD: 13.2 % — SIGNIFICANT CHANGE UP (ref 10.3–14.5)
SODIUM SERPL-SCNC: 136 MMOL/L — SIGNIFICANT CHANGE UP (ref 135–145)
SPECIMEN SOURCE: SIGNIFICANT CHANGE UP
WBC # BLD: 6.39 K/UL — SIGNIFICANT CHANGE UP (ref 3.8–10.5)
WBC # FLD AUTO: 6.39 K/UL — SIGNIFICANT CHANGE UP (ref 3.8–10.5)

## 2025-05-04 PROCEDURE — G0545: CPT

## 2025-05-04 PROCEDURE — 99222 1ST HOSP IP/OBS MODERATE 55: CPT | Mod: GC

## 2025-05-04 RX ADMIN — Medication 3 MILLIGRAM(S): at 21:07

## 2025-05-04 RX ADMIN — Medication 25 GRAM(S): at 05:28

## 2025-05-04 RX ADMIN — Medication 40 MILLIEQUIVALENT(S): at 10:30

## 2025-05-04 RX ADMIN — Medication 25 GRAM(S): at 13:53

## 2025-05-04 RX ADMIN — Medication 25 GRAM(S): at 21:07

## 2025-05-04 NOTE — CONSULT NOTE ADULT - SUBJECTIVE AND OBJECTIVE BOX
Patient is a 69y old  Male who presents with a chief complaint of gallbladder fossa abscess (04 May 2025 09:30)    HPI:  69M PMH gout, CAD s/p CABG 2023 (previously on Plavix, last dose 3 weeks ago), HLD, s/p ERCP and laparoscopic cholecystectomy for choledocholithiasis on 4/18, post-operative course complicated by biliary leak (CTAP and HIDA completed 4/20; CTAP showing hemoperitoneum in the LUQ, HIDA showing biliary leak) s/p cholangiogram with balloon sweep of clots, sludge and metal stent placement at [facility] for biliary leak on 4/21. Discharged on 4/23. Returns with generalized fatigue, low appetite, and low energy since being discharged home. Had concern for gout however uric acid level negative, however other outpatient levels significant for leukocytosis 17, Tbili 2.1 (Tbili at discharge on 4/23 was 2.5), . WBC 11.6.    In ED, afebrile, tachycardic to 114, SBP 110s.    CTAP with IV contrast and CTA chest showed No pulmonary embolism, however, interval development of a large 10.2 cm abscess in the cholecystectomy   bed.      PAST MEDICAL & SURGICAL HISTORY:  Hypertension      Hyperlipidemia      CAD (coronary artery disease)      Gout      CAD (coronary artery disease)      S/P arthroscopy of shoulder      History of percutaneous coronary intervention      Status post phlebectomy      S/P CABG (coronary artery bypass graft)        FAMILY HISTORY:  Family history of early CAD (Father)    No pertinent family history in first degree relatives    [] Family history not pertinent as reviewed with the patient and family    SOCIAL HISTORY:  ***    ALLERGIES: No Known Allergies      HOME MEDICATIONS: ***    CURRENT MEDICATIONS  MEDICATIONS (STANDING): piperacillin/tazobactam IVPB... 3.375 Gram(s) IV Intermittent once  sodium chloride 0.9% Bolus 1000 milliLiter(s) IV Bolus once  vancomycin  IVPB. 1000 milliGRAM(s) IV Intermittent once    MEDICATIONS (PRN):  --------------------------------------------------------------------------------------------    Vitals:   T(C): 37.5 (05-01-25 @ 15:55), Max: 37.5 (05-01-25 @ 15:55)  HR: 114 (05-01-25 @ 15:55) (114 - 114)  BP: 112/74 (05-01-25 @ 15:55) (112/74 - 112/74)  RR: 20 (05-01-25 @ 15:55) (20 - 20)  SpO2: 98% (05-01-25 @ 15:55) (98% - 98%)  CAPILLARY BLOOD GLUCOSE          Height (cm): 175.3 (05-01 @ 15:55)  Weight (kg): 95.3 (05-01 @ 15:55)  BMI (kg/m2): 31 (05-01 @ 15:55)  BSA (m2): 2.11 (05-01 @ 15:55)      PHYSICAL EXAM:  General: NAD, appears fatigued, pale  Neuro: Alert and answering questions appropriately   Cardio: No peripheral edema  Resp: Good effort, no signs of respiratory distress  GI/Abd: Soft, non-tender, non-distended, port sites healed nicely; reports "soreness" upon palpation to epigastric region, mildly in the RUQ  --------------------------------------------------------------------------------------------    LABS                --------------------------------------------------------------------------------------------    MICROBIOLOGY      --------------------------------------------------------------------------------------------    IMAGING     (01 May 2025 22:52)     REVIEW OF SYSTEMS  [  ] ROS unobtainable because:    [ x ] All other systems negative except as noted below  Constitutional:  [ ] fever [ ] chills  [ ] weight loss  [ ]night sweat  [ ]poor appetite/PO intake [ ]fatigue   Skin:  [ ] rash [ ] phlebitis	  Eyes: [ ] icterus [ ] pain  [ ] discharge	  ENMT: [ ] sore throat  [ ] thrush [ ] ulcers [ ] exudates [ ]anosmia  Respiratory: [ ] dyspnea [ ] hemoptysis [ ] cough [ ] sputum	  Cardiovascular:  [ ] chest pain [ ] palpitations [ ] edema	  Gastrointestinal:  [ ] nausea [ ] vomiting [ ] diarrhea [ ] constipation [ ] pain	  Genitourinary:  [ ] dysuria [ ] frequency [ ] hematuria [ ] discharge [ ] flank pain  [ ] incontinence  Musculoskeletal:  [ ] myalgias [ ] arthralgias [ ] arthritis  [ ] back pain  Neurological:  [ ] headache [ ] weakness [ ] seizures  [ ] confusion/altered mental status  prior hospital charts reviewed [V]  primary team notes reviewed [V]  other consultant notes reviewed [V]    PAST MEDICAL & SURGICAL HISTORY:  Hypertension      Hyperlipidemia      CAD (coronary artery disease)      Gout      CAD (coronary artery disease)      S/P arthroscopy of shoulder      History of percutaneous coronary intervention      Status post phlebectomy      S/P CABG (coronary artery bypass graft)          SOCIAL HISTORY:  - Denied smoking/vaping/alcohol/recreational drug use    FAMILY HISTORY:  Family history of early CAD (Father)    No pertinent family history in first degree relatives        Allergies  No Known Allergies        ANTIMICROBIALS:  piperacillin/tazobactam IVPB.. 3.375 every 8 hours      ANTIMICROBIALS (past 90 days):  MEDICATIONS  (STANDING):  piperacillin/tazobactam IVPB.   200 mL/Hr IV Intermittent (05-02-25 @ 01:26)    piperacillin/tazobactam IVPB.-   25 mL/Hr IV Intermittent (05-02-25 @ 05:19)    piperacillin/tazobactam IVPB..   25 mL/Hr IV Intermittent (05-04-25 @ 05:28)   25 mL/Hr IV Intermittent (05-03-25 @ 21:40)   25 mL/Hr IV Intermittent (05-03-25 @ 13:26)   25 mL/Hr IV Intermittent (05-03-25 @ 05:30)   25 mL/Hr IV Intermittent (05-02-25 @ 21:14)   25 mL/Hr IV Intermittent (05-02-25 @ 13:08)    piperacillin/tazobactam IVPB...   200 mL/Hr IV Intermittent (05-01-25 @ 17:49)    vancomycin  IVPB.   250 mL/Hr IV Intermittent (05-01-25 @ 19:08)        OTHER MEDS:   MEDICATIONS  (STANDING):  acetaminophen   IVPB .. 1000 every 6 hours PRN  enoxaparin Injectable 40 every 24 hours  HYDROmorphone  Injectable 0.5 every 10 minutes PRN  melatonin 3 at bedtime PRN  ondansetron Injectable 4 once PRN      VITALS:  Vital Signs Last 24 Hrs  T(F): 98 (05-04-25 @ 08:33), Max: 102.5 (05-01-25 @ 17:10)    Vital Signs Last 24 Hrs  HR: 63 (05-04-25 @ 08:33) (63 - 78)  BP: 144/87 (05-04-25 @ 08:33) (127/61 - 153/79)  RR: 18 (05-04-25 @ 08:33)  SpO2: 96% (05-04-25 @ 08:33) (95% - 96%)  Wt(kg): --    EXAM:    GA: NAD, AOx3  HEENT: oral cavity no lesion  CV: nl S1/S2, no RMG  Lungs: CTAB, No distress  Abd: BS+, soft, nontender, no rebounding pain  Ext: no edema  Neuro: No focal deficits  Skin: Intact  IV: no phlebitis  Labs:                        10.5   6.39  )-----------( 452      ( 04 May 2025 07:23 )             32.1     05-04    136  |  103  |  7   ----------------------------<  101[H]  3.6   |  22  |  0.90    Ca    8.4      04 May 2025 07:18  Phos  3.0     05-04  Mg     2.4     05-04    TPro  6.4  /  Alb  2.9[L]  /  TBili  0.7  /  DBili  0.4[H]  /  AST  32  /  ALT  35  /  AlkPhos  81  05-04    WBC Trend:  WBC Count: 6.39 (05-04-25 @ 07:23)  WBC Count: 7.40 (05-03-25 @ 07:06)  WBC Count: 9.10 (05-02-25 @ 07:16)  WBC Count: 11.61 (05-01-25 @ 17:31)      Creatine Trend:  Creatinine: 0.90 (05-04)  Creatinine: 0.90 (05-03)  Creatinine: 0.90 (05-02)  Creatinine: 0.89 (05-01)    Liver Biochemical Testing Trend:  Alanine Aminotransferase (ALT/SGPT): 35 (05-04)  Alanine Aminotransferase (ALT/SGPT): 31 (05-03)  Alanine Aminotransferase (ALT/SGPT): 46 *H* (05-01)  Alanine Aminotransferase (ALT/SGPT): 86 *H* (04-23)  Alanine Aminotransferase (ALT/SGPT): 153 *H* (04-22)  Aspartate Aminotransferase (AST/SGOT): 32 (05-04-25 @ 07:18)  Aspartate Aminotransferase (AST/SGOT): 24 (05-03-25 @ 07:05)  Aspartate Aminotransferase (AST/SGOT): 28 (05-01-25 @ 17:31)  Aspartate Aminotransferase (AST/SGOT): 30 (04-23-25 @ 06:28)  Aspartate Aminotransferase (AST/SGOT): 77 (04-22-25 @ 07:05)  Bilirubin Direct: 0.4 (05-04)  Bilirubin Total: 0.7 (05-04)  Bilirubin Direct: 0.4 (05-03)  Bilirubin Total: 0.7 (05-03)  Bilirubin Total: 1.5 (05-01)    Trend LDH      Urinalysis Basic - ( 04 May 2025 07:18 )    Color: x / Appearance: x / SG: x / pH: x  Gluc: 101 mg/dL / Ketone: x  / Bili: x / Urobili: x   Blood: x / Protein: x / Nitrite: x   Leuk Esterase: x / RBC: x / WBC x   Sq Epi: x / Non Sq Epi: x / Bacteria: x      MICROBIOLOGY:        Culture - Abscess with Gram Stain (collected 02 May 2025 12:51)  Source: Abscess Gallbladder fossa collection  Preliminary Report:    Insufficient growth-culture reincubated    Culture - Blood (collected 01 May 2025 17:20)  Source: Blood Blood-Peripheral  Preliminary Report:    No growth at 48 Hours    Culture - Blood (collected 01 May 2025 17:00)  Source: Blood Blood-Peripheral  Preliminary Report:    No growth at 48 Hours      Procalcitonin: 0.36 (05-01)    Lactate, Blood: 2.1 (05-01 @ 17:35)  Blood Gas Venous - Lactate: 1.6 (05-01 @ 17:19)    A1C with Estimated Average Glucose Result: 5.5 % (04-18-25 @ 07:06)      CSF:      RADIOLOGY:  < from: NM Hepatobiliary Imaging (04.21.25 @ 14:30) >    OTHER STUDIES USED FOR CORRELATION: CT of the abdomen 4/20/2025.    FINDINGS: Increased radiotracer uptake is noted within the gallbladder   fossa and tracking along the right paracolic gutter.    IMPRESSION: Findings consistent with a biliary leak.    < end of copied text >  < from: CT Abdomen and Pelvis w/ IV Cont (05.01.25 @ 18:10) >  IMPRESSION:  No pulmonary embolism.    Interval development of a large 10.2 cm abscess in the cholecystectomy   bed.    Interval placement of a metallic CBD stent with trace central   pneumobilia. Intraluminal debris within the stent.    Small volume mildly complex ascites, markedly decreased since prior exam.      < from: CT Angio Abdomen and Pelvis w/ IV Cont (04.20.25 @ 10:31) >  IMPRESSION:    No aortic dissection.    Moderate volume of hemoperitoneum with largest component in the left   upper quadrant measuring about 14 x 8.5 cm.    Serpiginous hyperdensity at the base of the cecum is of unclear etiology   and not adequately characterized on this single phase arterial exam;   diagnostic considerations include post operative change if the patient   has had surgery in this area or intravenous contrast. Recommend   correlation with either noncontrast CT abdomen and pelvis or potentially   GI bleeding study if clinically indicated.    < end of copied text >   Patient is a 69y old  Male who presents with a chief complaint of gallbladder fossa abscess (04 May 2025 09:30)    HPI:  69M PMH gout, CAD s/p CABG 2023 (previously on Plavix, last dose 3 weeks ago), HLD, s/p ERCP and laparoscopic cholecystectomy for choledocholithiasis on 4/18, post-operative course complicated by biliary leak (CTAP and HIDA completed 4/20; CTAP showing hemoperitoneum in the LUQ, HIDA showing biliary leak) s/p cholangiogram with balloon sweep of clots, sludge and metal stent placement at [facility] for biliary leak on 4/21. Discharged on 4/23. Returns with generalized fatigue, low appetite, and low energy since being discharged home. Had concern for gout however uric acid level negative, however other outpatient levels significant for leukocytosis 17, Tbili 2.1 (Tbili at discharge on 4/23 was 2.5), . WBC 11.6.    In ED, afebrile, tachycardic to 114, SBP 110s.    CTAP with IV contrast and CTA chest showed No pulmonary embolism, however, interval development of a large 10.2 cm abscess in the cholecystectomy   bed.      REVIEW OF SYSTEMS  [  ] ROS unobtainable because:    [ x ] All other systems negative except as noted below  Constitutional:  [ x] fever [x ] chills  [ ] weight loss  [ ]night sweat  [ ]poor appetite/PO intake [ ]fatigue   Skin:  [ ] rash [ ] phlebitis	  Eyes: [ ] icterus [ ] pain  [ ] discharge	  ENMT: [ ] sore throat  [ ] thrush [ ] ulcers [ ] exudates [ ]anosmia  Respiratory: [ ] dyspnea [ ] hemoptysis [ ] cough [ ] sputum	  Cardiovascular:  [ ] chest pain [ ] palpitations [ ] edema	  Gastrointestinal:  [ ] nausea [ ] vomiting [ ] diarrhea [ ] constipation [x ] pain	  Genitourinary:  [ ] dysuria [ ] frequency [ ] hematuria [ ] discharge [ ] flank pain  [ ] incontinence  Musculoskeletal:  [ ] myalgias [ ] arthralgias [ ] arthritis  [ ] back pain  Neurological:  [ ] headache [ ] weakness [ ] seizures  [ ] confusion/altered mental status  prior hospital charts reviewed [V]  primary team notes reviewed [V]  other consultant notes reviewed [V]    PAST MEDICAL & SURGICAL HISTORY:  Hypertension      Hyperlipidemia      CAD (coronary artery disease)      Gout      CAD (coronary artery disease)      S/P arthroscopy of shoulder      History of percutaneous coronary intervention      Status post phlebectomy      S/P CABG (coronary artery bypass graft)          SOCIAL HISTORY:  - Denied smoking/vaping/alcohol/recreational drug use    FAMILY HISTORY:  Family history of early CAD (Father)    No pertinent family history in first degree relatives        Allergies  No Known Allergies        ANTIMICROBIALS:  piperacillin/tazobactam IVPB.. 3.375 every 8 hours      ANTIMICROBIALS (past 90 days):  MEDICATIONS  (STANDING):  piperacillin/tazobactam IVPB.   200 mL/Hr IV Intermittent (05-02-25 @ 01:26)    piperacillin/tazobactam IVPB.-   25 mL/Hr IV Intermittent (05-02-25 @ 05:19)    piperacillin/tazobactam IVPB..   25 mL/Hr IV Intermittent (05-04-25 @ 05:28)   25 mL/Hr IV Intermittent (05-03-25 @ 21:40)   25 mL/Hr IV Intermittent (05-03-25 @ 13:26)   25 mL/Hr IV Intermittent (05-03-25 @ 05:30)   25 mL/Hr IV Intermittent (05-02-25 @ 21:14)   25 mL/Hr IV Intermittent (05-02-25 @ 13:08)    piperacillin/tazobactam IVPB...   200 mL/Hr IV Intermittent (05-01-25 @ 17:49)    vancomycin  IVPB.   250 mL/Hr IV Intermittent (05-01-25 @ 19:08)        OTHER MEDS:   MEDICATIONS  (STANDING):  acetaminophen   IVPB .. 1000 every 6 hours PRN  enoxaparin Injectable 40 every 24 hours  HYDROmorphone  Injectable 0.5 every 10 minutes PRN  melatonin 3 at bedtime PRN  ondansetron Injectable 4 once PRN      VITALS:  Vital Signs Last 24 Hrs  T(F): 98 (05-04-25 @ 08:33), Max: 102.5 (05-01-25 @ 17:10)    Vital Signs Last 24 Hrs  HR: 63 (05-04-25 @ 08:33) (63 - 78)  BP: 144/87 (05-04-25 @ 08:33) (127/61 - 153/79)  RR: 18 (05-04-25 @ 08:33)  SpO2: 96% (05-04-25 @ 08:33) (95% - 96%)  Wt(kg): --    EXAM:    GA: NAD, AOx3  HEENT: oral cavity no lesion  CV: nl S1/S2, no RMG  Lungs: CTAB, No distress  Abd: BS+, soft, +tenderness to RUQ, no rebounding pain +RUQ SELMA drain with purulence  Ext: no edema  Neuro: No focal deficits  Skin: Intact  IV: no phlebitis  Labs:                        10.5   6.39  )-----------( 452      ( 04 May 2025 07:23 )             32.1     05-04    136  |  103  |  7   ----------------------------<  101[H]  3.6   |  22  |  0.90    Ca    8.4      04 May 2025 07:18  Phos  3.0     05-04  Mg     2.4     05-04    TPro  6.4  /  Alb  2.9[L]  /  TBili  0.7  /  DBili  0.4[H]  /  AST  32  /  ALT  35  /  AlkPhos  81  05-04    WBC Trend:  WBC Count: 6.39 (05-04-25 @ 07:23)  WBC Count: 7.40 (05-03-25 @ 07:06)  WBC Count: 9.10 (05-02-25 @ 07:16)  WBC Count: 11.61 (05-01-25 @ 17:31)      Creatine Trend:  Creatinine: 0.90 (05-04)  Creatinine: 0.90 (05-03)  Creatinine: 0.90 (05-02)  Creatinine: 0.89 (05-01)    Liver Biochemical Testing Trend:  Alanine Aminotransferase (ALT/SGPT): 35 (05-04)  Alanine Aminotransferase (ALT/SGPT): 31 (05-03)  Alanine Aminotransferase (ALT/SGPT): 46 *H* (05-01)  Alanine Aminotransferase (ALT/SGPT): 86 *H* (04-23)  Alanine Aminotransferase (ALT/SGPT): 153 *H* (04-22)  Aspartate Aminotransferase (AST/SGOT): 32 (05-04-25 @ 07:18)  Aspartate Aminotransferase (AST/SGOT): 24 (05-03-25 @ 07:05)  Aspartate Aminotransferase (AST/SGOT): 28 (05-01-25 @ 17:31)  Aspartate Aminotransferase (AST/SGOT): 30 (04-23-25 @ 06:28)  Aspartate Aminotransferase (AST/SGOT): 77 (04-22-25 @ 07:05)  Bilirubin Direct: 0.4 (05-04)  Bilirubin Total: 0.7 (05-04)  Bilirubin Direct: 0.4 (05-03)  Bilirubin Total: 0.7 (05-03)  Bilirubin Total: 1.5 (05-01)    Trend LDH      Urinalysis Basic - ( 04 May 2025 07:18 )    Color: x / Appearance: x / SG: x / pH: x  Gluc: 101 mg/dL / Ketone: x  / Bili: x / Urobili: x   Blood: x / Protein: x / Nitrite: x   Leuk Esterase: x / RBC: x / WBC x   Sq Epi: x / Non Sq Epi: x / Bacteria: x      MICROBIOLOGY:        Culture - Abscess with Gram Stain (collected 02 May 2025 12:51)  Source: Abscess Gallbladder fossa collection  Preliminary Report:    Insufficient growth-culture reincubated    Culture - Blood (collected 01 May 2025 17:20)  Source: Blood Blood-Peripheral  Preliminary Report:    No growth at 48 Hours    Culture - Blood (collected 01 May 2025 17:00)  Source: Blood Blood-Peripheral  Preliminary Report:    No growth at 48 Hours      Procalcitonin: 0.36 (05-01)    Lactate, Blood: 2.1 (05-01 @ 17:35)  Blood Gas Venous - Lactate: 1.6 (05-01 @ 17:19)    A1C with Estimated Average Glucose Result: 5.5 % (04-18-25 @ 07:06)      CSF:      RADIOLOGY:  < from: NM Hepatobiliary Imaging (04.21.25 @ 14:30) >    OTHER STUDIES USED FOR CORRELATION: CT of the abdomen 4/20/2025.    FINDINGS: Increased radiotracer uptake is noted within the gallbladder   fossa and tracking along the right paracolic gutter.    IMPRESSION: Findings consistent with a biliary leak.    < end of copied text >  < from: CT Abdomen and Pelvis w/ IV Cont (05.01.25 @ 18:10) >  IMPRESSION:  No pulmonary embolism.    Interval development of a large 10.2 cm abscess in the cholecystectomy   bed.    Interval placement of a metallic CBD stent with trace central   pneumobilia. Intraluminal debris within the stent.    Small volume mildly complex ascites, markedly decreased since prior exam.      < from: CT Angio Abdomen and Pelvis w/ IV Cont (04.20.25 @ 10:31) >  IMPRESSION:    No aortic dissection.    Moderate volume of hemoperitoneum with largest component in the left   upper quadrant measuring about 14 x 8.5 cm.    Serpiginous hyperdensity at the base of the cecum is of unclear etiology   and not adequately characterized on this single phase arterial exam;   diagnostic considerations include post operative change if the patient   has had surgery in this area or intravenous contrast. Recommend   correlation with either noncontrast CT abdomen and pelvis or potentially   GI bleeding study if clinically indicated.    < end of copied text >

## 2025-05-04 NOTE — PROGRESS NOTE ADULT - SUBJECTIVE AND OBJECTIVE BOX
Post-procedure day #2    Overnight Events:  No acute events overnight    SUBJECTIVE:  Pt seen and examined bedside. Reports pain is well controlled. Tolerating diet, ambulating. Denies n/v/f/c.    OBJECTIVE:    Vital Signs Last 24 Hrs  T(C): 36.7 (04 May 2025 08:33), Max: 37.3 (03 May 2025 16:11)  T(F): 98 (04 May 2025 08:33), Max: 99.2 (03 May 2025 16:11)  HR: 63 (04 May 2025 08:33) (63 - 78)  BP: 144/87 (04 May 2025 08:33) (127/61 - 153/79)  BP(mean): --  RR: 18 (04 May 2025 08:33) (18 - 18)  SpO2: 96% (04 May 2025 08:33) (95% - 96%)    Parameters below as of 04 May 2025 08:33  Patient On (Oxygen Delivery Method): room air    PHYSICAL EXAM    -- CONSTITUTIONAL: NAD, lying in bed  -- NEURO: Awake, alert  -- HEENT: NC/AT  -- PULM: Non-labored respirations, equal chest rise bilaterally  -- ABDOMEN: soft ntnd. IR drain x1 serous.  -- EXTREMITIES: Warm and well perfused  -- PSYCH: Affect normal, A&Ox3    MEDICATIONS  (STANDING):  enoxaparin Injectable 40 milliGRAM(s) SubCutaneous every 24 hours  piperacillin/tazobactam IVPB.. 3.375 Gram(s) IV Intermittent every 8 hours  potassium chloride    Tablet ER 40 milliEquivalent(s) Oral once    MEDICATIONS  (PRN):  acetaminophen   IVPB .. 1000 milliGRAM(s) IV Intermittent every 6 hours PRN Mild Pain (1 - 3)  HYDROmorphone  Injectable 0.5 milliGRAM(s) IV Push every 10 minutes PRN Moderate Pain (4 - 6)  melatonin 3 milliGRAM(s) Oral at bedtime PRN Sleep  ondansetron Injectable 4 milliGRAM(s) IV Push once PRN Nausea and/or Vomiting                            10.5   6.39  )-----------( 452      ( 04 May 2025 07:23 )             32.1     05-04    136  |  103  |  7   ----------------------------<  101[H]  3.6   |  22  |  0.90    Ca    8.4      04 May 2025 07:18  Phos  3.0     05-04  Mg     2.4     05-04    TPro  6.4  /  Alb  2.9[L]  /  TBili  0.7  /  DBili  0.4[H]  /  AST  32  /  ALT  35  /  AlkPhos  81  05-04    I&O's Detail    03 May 2025 07:01  -  04 May 2025 07:00  --------------------------------------------------------  IN:    Oral Fluid: 250 mL  Total IN: 250 mL    OUT:    Bulb (mL): 50 mL    Voided (mL): 1250 mL  Total OUT: 1300 mL    Total NET: -1050 mL       anemia

## 2025-05-04 NOTE — CONSULT NOTE ADULT - NS PANP COMMENT GEN_ALL_CORE FT
69 m with HLD,CAD s/p CABG, s/p ERCP and laparoscopic cholecystectomy for choledocholithiasis on 4/18, c/b biliary leak (CTAP and HIDA completed 4/20; CTAP showing hemoperitoneum in the LUQ, HIDA showing biliary leak) s/p cholangiogram with balloon sweep of clots, sludge and metal stent placement at [facility] for biliary leak on 4/21, now with generalized fatigue, low appetite  febrile to 102.5, tachy, WBC: 17, Tbili 2.1 (Tbili at discharge on 4/23 was 2.5)  blood cx negative  CTA: No PE but interval development of a large 10.2 cm abscess in the cholecystectomy bed.  s/p IR drainage 5/2,  260 cc purulent fluid aspirated, gram stain with GNR and GPC in pairs but insufficient growth    laparoscopic cholecystectomy for choledocholithiasis on 4/18, c/b biliary leak s/p cholangiogram with balloon sweep of clots, sludge and metal stent  at [facility] on 4/21, now with fever, tachycardia, leukocytosis, large 10.2 cm abscess at the cholecystectomy bed  s/p IR drainage    * follow the IR cx  * c/w zosyn for now  * if fever again repeat blood cx  * monitor CBC/diff and CMP    The above assessment and plan was discussed with the primary team    Chen Mcmanus MD  contact on teams  After 5pm and on weekends call [phone]

## 2025-05-04 NOTE — PROGRESS NOTE ADULT - ASSESSMENT
69M PMH gout, CAD s/p CABG 2023 (previously on Plavix, last dose 3 weeks ago), HLD, s/p ERCP and laparoscopic cholecystectomy for choledocholithiasis on 4/18, post-operative course complicated by biliary leak (CTAP and HIDA completed 4/20; CTAP showing hemoperitoneum in the LUQ, HIDA showing biliary leak) s/p cholangiogram with balloon sweep of clots, sludge and metal stent placement at [facility] for biliary leak on 4/21. Now presenting with 10 cm abscess in gallbladder bed. s/p IR drainage of GB fossa abscess 5/2, progressing well. Pending abscess culture speciation/sensitivities.    Plan   -Reg diet  -Zosyn, appreciate recs per ID  -Monitor drain output  -lovenox VTE ppx   -Pain control PRN  -hold plavix, last dose 3 wks ago    ACS  [id number]

## 2025-05-04 NOTE — CONSULT NOTE ADULT - ASSESSMENT
69M PMH gout, CAD s/p CABG 2023, HLD, s/p ERCP and laparoscopic cholecystectomy for choledocholithiasis on 4/18, post-operative course complicated by biliary leak (CTAP and HIDA completed 4/20; CTAP showing hemoperitoneum in the LUQ, HIDA showing biliary leak) s/p cholangiogram with balloon sweep of clots, sludge and metal stent placement at [facility] for biliary leak on 4/21. Discharged on 4/23. Returns with generalized fatigue, low appetite, and low energy since being discharged home. Had concern for gout however uric acid level negative, however other outpatient levels significant for leukocytosis 17, Tbili 2.1 (Tbili at discharge on 4/23 was 2.5), . WBC 11.6.  In ED, afebrile, tachycardic to 114, SBP 110s. CTAP with IV contrast and CTA chest showed No pulmonary embolism, however, interval development of a large 10.2 cm abscess in the cholecystectomy bed. IR consulted for drainage and is now s/p Gallbladder fossa collection drainage with 8F pigtail. 260 cc purulent fluid aspirated and sent for culture with preliminary findings of GNR and GPC in pairs and chains and ID consulted.    Tmax 101.3 on 5/2 afebrile now last temp 5/2  Recent leukocytosis on 4/28-17 now WNL-6  5/1 bld cx neg x 2  5/2 GB fossa drain gm stain GPC in pairs and Cluster and GNR  Got VAnc x 1 5/1, Currently on zosyn 5/1-->  T-bili now normalized 0.7      INCOMPLETE ************************************             69M PMH gout, CAD s/p CABG 2023, HLD, s/p ERCP and laparoscopic cholecystectomy for choledocholithiasis on 4/18, post-operative course complicated by biliary leak (CTAP and HIDA completed 4/20; CTAP showing hemoperitoneum in the LUQ, HIDA showing biliary leak) s/p cholangiogram with balloon sweep of clots, sludge and metal stent placement at [facility] for biliary leak on 4/21. Discharged on 4/23. Returns with generalized fatigue, low appetite, and low energy since being discharged home. Had concern for gout however uric acid level negative, however other outpatient levels significant for leukocytosis 17, Tbili 2.1 (Tbili at discharge on 4/23 was 2.5), . WBC 11.6.  In ED, afebrile, tachycardic to 114, SBP 110s. CTAP with IV contrast and CTA chest showed No pulmonary embolism, however, interval development of a large 10.2 cm abscess in the cholecystectomy bed. IR consulted for drainage and is now s/p Gallbladder fossa collection drainage with 8F pigtail. 260 cc purulent fluid aspirated and sent for culture with preliminary findings of GNR and GPC in pairs and chains and ID consulted.    Tmax 101.3 on 5/2 afebrile now last temp 5/2  Recent leukocytosis on 4/28-17 now WNL-6  5/1 bld cx neg x 2  5/2 GB fossa drain gm stain GPC in pairs and Cluster and GNR  Got VAnc x 1 5/1, Currently on zosyn 5/1-->  T-bili now normalized 0.7      Plan  Overall pt appears to be improving, s/p GB fossa indwelling drain with cx pending  Continue Zosyn for now  Follow up identification and sensitivity of abscess cx  Continue to monitor for temps  Continue to monitor WBC  Plan d/w Dr. Mcmanus.

## 2025-05-05 ENCOUNTER — NON-APPOINTMENT (OUTPATIENT)
Age: 70
End: 2025-05-05

## 2025-05-05 ENCOUNTER — APPOINTMENT (OUTPATIENT)
Dept: TRAUMA SURGERY | Facility: CLINIC | Age: 70
End: 2025-05-05

## 2025-05-05 ENCOUNTER — TRANSCRIPTION ENCOUNTER (OUTPATIENT)
Age: 70
End: 2025-05-05

## 2025-05-05 VITALS
TEMPERATURE: 98 F | RESPIRATION RATE: 18 BRPM | DIASTOLIC BLOOD PRESSURE: 84 MMHG | SYSTOLIC BLOOD PRESSURE: 148 MMHG | OXYGEN SATURATION: 98 % | HEART RATE: 74 BPM

## 2025-05-05 LAB
ALBUMIN SERPL ELPH-MCNC: 2.9 G/DL — LOW (ref 3.3–5)
ALP SERPL-CCNC: 82 U/L — SIGNIFICANT CHANGE UP (ref 40–120)
ALT FLD-CCNC: 51 U/L — HIGH (ref 10–45)
ANION GAP SERPL CALC-SCNC: 13 MMOL/L — SIGNIFICANT CHANGE UP (ref 5–17)
AST SERPL-CCNC: 49 U/L — HIGH (ref 10–40)
BILIRUB DIRECT SERPL-MCNC: 0.3 MG/DL — SIGNIFICANT CHANGE UP (ref 0–0.3)
BILIRUB INDIRECT FLD-MCNC: 0.4 MG/DL — SIGNIFICANT CHANGE UP (ref 0.2–1)
BILIRUB SERPL-MCNC: 0.7 MG/DL — SIGNIFICANT CHANGE UP (ref 0.2–1.2)
BUN SERPL-MCNC: 10 MG/DL — SIGNIFICANT CHANGE UP (ref 7–23)
CALCIUM SERPL-MCNC: 8.6 MG/DL — SIGNIFICANT CHANGE UP (ref 8.4–10.5)
CHLORIDE SERPL-SCNC: 102 MMOL/L — SIGNIFICANT CHANGE UP (ref 96–108)
CO2 SERPL-SCNC: 20 MMOL/L — LOW (ref 22–31)
CREAT SERPL-MCNC: 0.92 MG/DL — SIGNIFICANT CHANGE UP (ref 0.5–1.3)
EGFR: 91 ML/MIN/1.73M2 — SIGNIFICANT CHANGE UP
EGFR: 91 ML/MIN/1.73M2 — SIGNIFICANT CHANGE UP
GLUCOSE SERPL-MCNC: 113 MG/DL — HIGH (ref 70–99)
HCT VFR BLD CALC: 32.5 % — LOW (ref 39–50)
HGB BLD-MCNC: 10.4 G/DL — LOW (ref 13–17)
MAGNESIUM SERPL-MCNC: 2.4 MG/DL — SIGNIFICANT CHANGE UP (ref 1.6–2.6)
MCHC RBC-ENTMCNC: 28.8 PG — SIGNIFICANT CHANGE UP (ref 27–34)
MCHC RBC-ENTMCNC: 32 G/DL — SIGNIFICANT CHANGE UP (ref 32–36)
MCV RBC AUTO: 90 FL — SIGNIFICANT CHANGE UP (ref 80–100)
NRBC BLD AUTO-RTO: 0 /100 WBCS — SIGNIFICANT CHANGE UP (ref 0–0)
PHOSPHATE SERPL-MCNC: 2.8 MG/DL — SIGNIFICANT CHANGE UP (ref 2.5–4.5)
PLATELET # BLD AUTO: 463 K/UL — HIGH (ref 150–400)
POTASSIUM SERPL-MCNC: 4 MMOL/L — SIGNIFICANT CHANGE UP (ref 3.5–5.3)
POTASSIUM SERPL-SCNC: 4 MMOL/L — SIGNIFICANT CHANGE UP (ref 3.5–5.3)
PROT SERPL-MCNC: 6.5 G/DL — SIGNIFICANT CHANGE UP (ref 6–8.3)
RBC # BLD: 3.61 M/UL — LOW (ref 4.2–5.8)
RBC # FLD: 13.2 % — SIGNIFICANT CHANGE UP (ref 10.3–14.5)
SODIUM SERPL-SCNC: 135 MMOL/L — SIGNIFICANT CHANGE UP (ref 135–145)
WBC # BLD: 5.71 K/UL — SIGNIFICANT CHANGE UP (ref 3.8–10.5)
WBC # FLD AUTO: 5.71 K/UL — SIGNIFICANT CHANGE UP (ref 3.8–10.5)

## 2025-05-05 PROCEDURE — 83735 ASSAY OF MAGNESIUM: CPT

## 2025-05-05 PROCEDURE — 85610 PROTHROMBIN TIME: CPT

## 2025-05-05 PROCEDURE — 74177 CT ABD & PELVIS W/CONTRAST: CPT | Mod: MC

## 2025-05-05 PROCEDURE — 82435 ASSAY OF BLOOD CHLORIDE: CPT

## 2025-05-05 PROCEDURE — 84100 ASSAY OF PHOSPHORUS: CPT

## 2025-05-05 PROCEDURE — 87077 CULTURE AEROBIC IDENTIFY: CPT

## 2025-05-05 PROCEDURE — 49406 IMAGE CATH FLUID PERI/RETRO: CPT

## 2025-05-05 PROCEDURE — 85025 COMPLETE CBC W/AUTO DIFF WBC: CPT

## 2025-05-05 PROCEDURE — 85014 HEMATOCRIT: CPT

## 2025-05-05 PROCEDURE — 82803 BLOOD GASES ANY COMBINATION: CPT

## 2025-05-05 PROCEDURE — 80048 BASIC METABOLIC PNL TOTAL CA: CPT

## 2025-05-05 PROCEDURE — 86901 BLOOD TYPING SEROLOGIC RH(D): CPT

## 2025-05-05 PROCEDURE — C1729: CPT

## 2025-05-05 PROCEDURE — 71275 CT ANGIOGRAPHY CHEST: CPT | Mod: MC

## 2025-05-05 PROCEDURE — 86900 BLOOD TYPING SEROLOGIC ABO: CPT

## 2025-05-05 PROCEDURE — 83605 ASSAY OF LACTIC ACID: CPT

## 2025-05-05 PROCEDURE — 99232 SBSQ HOSP IP/OBS MODERATE 35: CPT

## 2025-05-05 PROCEDURE — 85730 THROMBOPLASTIN TIME PARTIAL: CPT

## 2025-05-05 PROCEDURE — 85018 HEMOGLOBIN: CPT

## 2025-05-05 PROCEDURE — 86850 RBC ANTIBODY SCREEN: CPT

## 2025-05-05 PROCEDURE — 82330 ASSAY OF CALCIUM: CPT

## 2025-05-05 PROCEDURE — 87205 SMEAR GRAM STAIN: CPT

## 2025-05-05 PROCEDURE — 87070 CULTURE OTHR SPECIMN AEROBIC: CPT

## 2025-05-05 PROCEDURE — 80076 HEPATIC FUNCTION PANEL: CPT

## 2025-05-05 PROCEDURE — 84145 PROCALCITONIN (PCT): CPT

## 2025-05-05 PROCEDURE — 84132 ASSAY OF SERUM POTASSIUM: CPT

## 2025-05-05 PROCEDURE — 80053 COMPREHEN METABOLIC PANEL: CPT

## 2025-05-05 PROCEDURE — C1769: CPT

## 2025-05-05 PROCEDURE — 99231 SBSQ HOSP IP/OBS SF/LOW 25: CPT

## 2025-05-05 PROCEDURE — 87040 BLOOD CULTURE FOR BACTERIA: CPT

## 2025-05-05 PROCEDURE — 82248 BILIRUBIN DIRECT: CPT

## 2025-05-05 PROCEDURE — 85027 COMPLETE CBC AUTOMATED: CPT

## 2025-05-05 PROCEDURE — 84295 ASSAY OF SERUM SODIUM: CPT

## 2025-05-05 PROCEDURE — 96374 THER/PROPH/DIAG INJ IV PUSH: CPT

## 2025-05-05 PROCEDURE — 99285 EMERGENCY DEPT VISIT HI MDM: CPT

## 2025-05-05 PROCEDURE — 82947 ASSAY GLUCOSE BLOOD QUANT: CPT

## 2025-05-05 PROCEDURE — 96375 TX/PRO/DX INJ NEW DRUG ADDON: CPT

## 2025-05-05 PROCEDURE — 83690 ASSAY OF LIPASE: CPT

## 2025-05-05 PROCEDURE — G0545: CPT

## 2025-05-05 RX ORDER — AMOXICILLIN AND CLAVULANATE POTASSIUM 500; 125 MG/1; MG/1
1 TABLET, FILM COATED ORAL
Qty: 20 | Refills: 0
Start: 2025-05-05 | End: 2025-05-14

## 2025-05-05 RX ORDER — SOD PHOS DI, MONO/K PHOS MONO 250 MG
1 TABLET ORAL ONCE
Refills: 0 | Status: COMPLETED | OUTPATIENT
Start: 2025-05-05 | End: 2025-05-05

## 2025-05-05 RX ORDER — ASPIRIN 325 MG
81 TABLET ORAL DAILY
Refills: 0 | Status: DISCONTINUED | OUTPATIENT
Start: 2025-05-05 | End: 2025-05-05

## 2025-05-05 RX ADMIN — ENOXAPARIN SODIUM 40 MILLIGRAM(S): 100 INJECTION SUBCUTANEOUS at 05:18

## 2025-05-05 RX ADMIN — Medication 81 MILLIGRAM(S): at 14:02

## 2025-05-05 RX ADMIN — Medication 1 PACKET(S): at 14:03

## 2025-05-05 RX ADMIN — Medication 25 GRAM(S): at 14:02

## 2025-05-05 RX ADMIN — Medication 25 GRAM(S): at 05:18

## 2025-05-05 NOTE — PROGRESS NOTE ADULT - SUBJECTIVE AND OBJECTIVE BOX
Follow Up:  abscess in the cholecystectomy bed    Interval History/ROS: pt afebrile, no abd pain, just discomfort at the drain site, no vomiting but poor appetite        Allergies  No Known Allergies        ANTIMICROBIALS:  piperacillin/tazobactam IVPB.. 3.375 every 8 hours      OTHER MEDS:  acetaminophen   IVPB .. 1000 milliGRAM(s) IV Intermittent every 6 hours PRN  aspirin enteric coated 81 milliGRAM(s) Oral daily  enoxaparin Injectable 40 milliGRAM(s) SubCutaneous every 24 hours  HYDROmorphone  Injectable 0.5 milliGRAM(s) IV Push every 10 minutes PRN  melatonin 3 milliGRAM(s) Oral at bedtime PRN  ondansetron Injectable 4 milliGRAM(s) IV Push once PRN  potassium phosphate / sodium phosphate Powder (PHOS-NaK) 1 Packet(s) Oral once      Vital Signs Last 24 Hrs  T(C): 36.7 (05 May 2025 12:43), Max: 36.9 (04 May 2025 21:09)  T(F): 98.1 (05 May 2025 12:43), Max: 98.5 (04 May 2025 21:09)  HR: 67 (05 May 2025 12:43) (65 - 83)  BP: 138/85 (05 May 2025 12:43) (111/72 - 162/84)  BP(mean): --  RR: 17 (05 May 2025 12:43) (17 - 18)  SpO2: 97% (05 May 2025 12:43) (96% - 98%)    Parameters below as of 05 May 2025 12:43  Patient On (Oxygen Delivery Method): room air        Physical Exam:  General:    NAD,  non toxic  Respiratory:    comfortable on RA  abd:     soft,   no tenderness, IR drain with purulent drainage  :    no  braun  Musculoskeletal:   no joint swelling  vascular: no phlebitis  Skin:    no rash                          10.4   5.71  )-----------( 463      ( 05 May 2025 07:34 )             32.5       05-05    135  |  102  |  10  ----------------------------<  113[H]  4.0   |  20[L]  |  0.92    Ca    8.6      05 May 2025 07:34  Phos  2.8     05-05  Mg     2.4     05-05    TPro  6.5  /  Alb  2.9[L]  /  TBili  0.7  /  DBili  0.3  /  AST  49[H]  /  ALT  51[H]  /  AlkPhos  82  05-05      Urinalysis Basic - ( 05 May 2025 07:34 )    Color: x / Appearance: x / SG: x / pH: x  Gluc: 113 mg/dL / Ketone: x  / Bili: x / Urobili: x   Blood: x / Protein: x / Nitrite: x   Leuk Esterase: x / RBC: x / WBC x   Sq Epi: x / Non Sq Epi: x / Bacteria: x        MICROBIOLOGY:  v  Abscess Gallbladder fossa collection  05-02-25   Culture yields >4 types of aerobic and/or anaerobic bacteria  Call client services within 7 days if further workup is clinically  indicated. including  Few Streptococcus anginosus "Susceptibilities not performed"  Rare Alpha hemolytic strep "Susceptibilities not performed"  Few Streptococcus constellatus "Susceptibilities not performed"  Rare Prevotella buccae "Susceptibilities not performed"  Few Peptostreptococcus anaerobius "Susceptibilities not performed"  --    No polymorphonuclear cells seen per low power field  Numerous Gram Negative Rods per oil power field  Moderate Gram Positive Cocci in Pairs and Chains per oil power field  Few Gram Positive Rods per oil power field      Blood Blood-Peripheral  05-01-25   No growth at 72 Hours  --  --      Blood Blood-Peripheral  05-01-25   No growth at 72 Hours  --  --                RADIOLOGY:  Images independently visualized and reviewed personally, findings as below  < from: IR Procedure (05.02.25 @ 14:36) >    Percutaneous placement of a 8.5 [place] drainage catheter into gallbladder   fossa collection, yielding 260 mL of purulent fluid.    < end of copied text >  < from: CT Angio Chest PE Protocol w/ IV Cont (05.01.25 @ 18:10) >  IMPRESSION:  No pulmonary embolism.    Interval development of a large 10.2 cm abscess in the cholecystectomy   bed.    Interval placement of a metallic CBD stent with trace central   pneumobilia. Intraluminal debris within the stent.    Small volume mildly complex ascites, markedly decreased since prior exam.    < end of copied text >

## 2025-05-05 NOTE — DISCHARGE NOTE PROVIDER - NSDCFUSCHEDAPPT_GEN_ALL_CORE_FT
[facility] Physician [facility]  CATSCAN [address]  Scheduled Appointment: 05/17/2025    [facility] [address]  Scheduled Appointment: 05/17/2025    Melly Heredia  [facility] Physician [facility]  RHEUM [address]  Scheduled Appointment: 05/30/2025    Alina Lane  [facility] Physician [facility]  GASTRO [address]  Scheduled Appointment: 06/18/2025     [facility] Physician [facility]  CATSCAN [address]  Scheduled Appointment: 05/13/2025    [facility]  CATSCAN [address]  Scheduled Appointment: 05/17/2025    [facility] [address]  Scheduled Appointment: 05/17/2025    Melly Heredia  [facility] Physician [facility]  [address]  Scheduled Appointment: 05/30/2025    Alina Lane  [facility] Physician [facility]  [address]STR[address]  Scheduled Appointment: 06/18/2025

## 2025-05-05 NOTE — DISCHARGE NOTE PROVIDER - NSDCFUADDINST_GEN_ALL_CORE_FT
-flush drain with 5cc NS daily forward only; DO NOT aspirate  -change dressing q3 days or when dressing is saturated  -regarding outpatient follow up with IR, if the patient is d/c home with drainage catheter they can make an appointment with IR by calling the IR booking office at [phone]; recommend IR follow in 1 week for tube evaluation, will also get noncon CT abd/pelvis at time of evaluation.

## 2025-05-05 NOTE — PROGRESS NOTE ADULT - SUBJECTIVE AND OBJECTIVE BOX
Interventional Radiology Follow-Up Note    This is a 69y Male s/p Gallbladder fossa david on  in Interventional Radiology with Dr. Quesada.     S: Patient seen and examined @ bedside. States some pain near drain site but overall is feeling better. States he may go home today.    Medication:     enoxaparin Injectable: ()  piperacillin/tazobactam IVPB..: ()    Vitals:   T(F): 98, Max: 98.5 (21:09)  HR: 76  BP: 111/72  RR: 17  SpO2: 96%    Physical Exam:  General: Nontoxic, in NAD, A&O x3.  Abdomen: soft, NTND, no peritoneal signs.  Drain Device: Drain intact attached to gravity bagJP with yellowish output. Dressing clean, dry, intact. Drain flushed w 5cc NS w/o difficulty. No pericatheter leakage noted, +fluid return noted in tubing.       24hr Drain output: 20    LABS:                          10.4   5.71  )-----------( 463      ( 05 May 2025 07:34 )             32.5         135  |  102  |  10  ----------------------------<  113[H]  4.0   |  20[L]  |  0.92    Ca    8.6      05 May 2025 07:34  Phos  2.8       Mg     2.4         TPro  6.5  /  Alb  2.9[L]  /  TBili  0.7  /  DBili  0.3  /  AST  49[H]  /  ALT  51[H]  /  AlkPhos  82              Assessment/Plan: 69M PMH gout, CAD s/p CABG  (previously on Plavix, last dose 3 weeks ago), HLD, s/p ERCP and laparoscopic cholecystectomy for choledocholithiasis on , post-operative course complicated by biliary leak (CTAP and HIDA completed ; CTAP showing hemoperitoneum in the LUQ, HIDA showing biliary leak) s/p cholangiogram with balloon sweep of clots, sludge and metal stent placement at [facility] for biliary leak on . Now presenting with 10 cm abscess in gallbladder bed. s/p IR drainage of GB fossa abscess  (260 cc purulent fluid aspirated.)      -continue global management per primary team  -WBC downtrending, 5.71 today. Cx + polymicrobial (> 4 organisms), remains on zosyn, ID following.   -H/H stable, continue to monitor h/h  -Hemodynamically stable, continue to trend vs/labs  -flush drain with 5cc NS daily forward only; DO NOT aspirate  -change dressing q3 days or when dressing is saturated  -regarding outpatient follow up with IR, if the patient is d/c home with drainage catheter they can make an appointment with IR by calling the IR booking office at [phone]; recommend IR follow in 1 week for tube evaluation, will also get noncon CT abd/pelvis at time of evaluation.   - Greater than 50% of the encounter was spent counseling and/or coordination of care on drain management and follow up.   -they will benefit from VNS service to help with drainage catheter care; they should continue same drainage catheter care as an outpatient.  -d/w MD Quesada    Any questions or concerns regarding above please reach out to IR:   -Available on microsoft teams  -During working hours (7a-5p): call [phone]  -Emergent issues after 5pm: page: [phone]  -Non-emergent consults: Please place a Alex order "IR Consult" with an appropriate callback number  -Scheduling questions: [phone]  -Clinic/Outpatient [phone]    Jen Mayorga DNP-[facility]  Interventional Radiology  Available on [employer] teams          Interventional Radiology Follow-Up Note    This is a 69y Male s/p Gallbladder fossa david on  in Interventional Radiology with Dr. Quesada.     S: Patient seen and examined @ bedside. States some pain near drain site but overall is feeling better. States he may go home today.    Medication:     enoxaparin Injectable: ()  piperacillin/tazobactam IVPB..: ()    Vitals:   T(F): 98, Max: 98.5 (21:09)  HR: 76  BP: 111/72  RR: 17  SpO2: 96%    Physical Exam:  General: Nontoxic, in NAD, A&O x3.  Abdomen: soft, NTND, no peritoneal signs.  Drain Device: Drain intact attached to gravity bagJP with yellowish output. Dressing clean, dry, intact. Drain flushed w 5cc NS w/o difficulty. No pericatheter leakage noted, +fluid return noted in tubing.       24hr Drain output: 20    LABS:                          10.4   5.71  )-----------( 463      ( 05 May 2025 07:34 )             32.5         135  |  102  |  10  ----------------------------<  113[H]  4.0   |  20[L]  |  0.92    Ca    8.6      05 May 2025 07:34  Phos  2.8       Mg     2.4         TPro  6.5  /  Alb  2.9[L]  /  TBili  0.7  /  DBili  0.3  /  AST  49[H]  /  ALT  51[H]  /  AlkPhos  82              Assessment/Plan: 69M PMH gout, CAD s/p CABG  (previously on Plavix, last dose 3 weeks ago), HLD, s/p ERCP and laparoscopic cholecystectomy for choledocholithiasis on , post-operative course complicated by biliary leak (CTAP and HIDA completed ; CTAP showing hemoperitoneum in the LUQ, HIDA showing biliary leak) s/p cholangiogram with balloon sweep of clots, sludge and metal stent placement at [facility] for biliary leak on . Now presenting with 10 cm abscess in gallbladder bed. s/p IR drainage of GB fossa abscess  (260 cc purulent fluid aspirated.)      -continue global management per primary team  -WBC downtrending, 5.71 today. Cx + polymicrobial (> 4 organisms), remains on zosyn, ID following.   -H/H stable, continue to monitor h/h  -Hemodynamically stable, continue to trend vs/labs  -flush drain with 5cc NS daily forward only; DO NOT aspirate  -change dressing q3 days or when dressing is saturated  -regarding outpatient follow up with IR, f/u in 1 week, appointment facilitated for  @130pm (CT-non-con Abd/pel); 2pm drain evaluation, please arrive by 1pm. If need to reschedule, please call the IR booking office at [phone].  - Greater than 50% of the encounter was spent counseling and/or coordination of care on drain management and follow up.   -they will benefit from VNS service to help with drainage catheter care; they should continue same drainage catheter care as an outpatient.  -d/w MD Quesada    Any questions or concerns regarding above please reach out to IR:   -Available on microsoft teams  -During working hours (7a-5p): call [phone]  -Emergent issues after 5pm: page: [phone]  -Non-emergent consults: Please place a [place] order "IR Consult" with an appropriate callback number  -Scheduling questions: [phone]  -Clinic/Outpatient [phone]    Jen Mayorga DNP-[facility]  Interventional Radiology  Available on [employer] teams

## 2025-05-05 NOTE — PROGRESS NOTE ADULT - SUBJECTIVE AND OBJECTIVE BOX
SURGERY DAILY PROGRESS NOTE    STATUS POST:      SUBJECTIVE: Pt seen and examined at bedside. Patient feeling well, pain well controlled. Has been tolerating a diet.       OBJECTIVE:  Vital Signs Last 24 Hrs  T(C): 36.7 (05 May 2025 05:00), Max: 36.9 (04 May 2025 21:09)  T(F): 98 (05 May 2025 05:00), Max: 98.5 (04 May 2025 21:09)  HR: 65 (05 May 2025 05:00) (63 - 83)  BP: 162/84 (05 May 2025 05:00) (123/81 - 162/84)  BP(mean): --  RR: 18 (05 May 2025 05:00) (17 - 18)  SpO2: 96% (05 May 2025 05:00) (96% - 98%)    Parameters below as of 05 May 2025 05:00  Patient On (Oxygen Delivery Method): room air        I&O's Summary    04 May 2025 07:01  -  05 May 2025 07:00  --------------------------------------------------------  IN: 620 mL / OUT: 2420 mL / NET: -1800 mL        Physical Exam:  General Appearance: Appears well, NAD, A& O x 3  Chest: Equal expansion bilaterally, equal breath sounds  Abdomen: Soft, nontense, soft ntnd. IR drain x1 serous  Extremities: Grossly symmetric, SCD's in place     LABS:                        10.5   6.39  )-----------( 452      ( 04 May 2025 07:23 )             32.1     05-04    136  |  103  |  7   ----------------------------<  101[H]  3.6   |  22  |  0.90    Ca    8.4      04 May 2025 07:18  Phos  3.0     05-04  Mg     2.4     05-04    TPro  6.4  /  Alb  2.9[L]  /  TBili  0.7  /  DBili  0.4[H]  /  AST  32  /  ALT  35  /  AlkPhos  81  05-04      Urinalysis Basic - ( 04 May 2025 07:18 )    Color: x / Appearance: x / SG: x / pH: x  Gluc: 101 mg/dL / Ketone: x  / Bili: x / Urobili: x   Blood: x / Protein: x / Nitrite: x   Leuk Esterase: x / RBC: x / WBC x   Sq Epi: x / Non Sq Epi: x / Bacteria: x        RADIOLOGY & ADDITIONAL STUDIES:

## 2025-05-05 NOTE — DISCHARGE NOTE NURSING/CASE MANAGEMENT/SOCIAL WORK - NSDCPEFALRISK_GEN_ALL_CORE
For information on Fall & Injury Prevention, visit: [email or web address] OR  [email or web address] OR  https://www.cdc.gov/steadi/patient.html

## 2025-05-05 NOTE — DISCHARGE NOTE NURSING/CASE MANAGEMENT/SOCIAL WORK - PATIENT PORTAL LINK FT
You can access the FollowMyHealth Patient Portal offered by [facility] by registering at the following website: [email or web address]. By joining [email or web address]’s FollowMyHealth portal, you will also be able to view your health information using other applications (apps) compatible with our system.

## 2025-05-05 NOTE — DISCHARGE NOTE NURSING/CASE MANAGEMENT/SOCIAL WORK - FINANCIAL ASSISTANCE
[facility] provides services at a reduced cost to those who are determined to be eligible through [facility]’s financial assistance program. Information regarding [facility]’s financial assistance program can be found by going to [email or web address] or by calling [phone].

## 2025-05-05 NOTE — DISCHARGE NOTE NURSING/CASE MANAGEMENT/SOCIAL WORK - NSDCPNINST_GEN_ALL_CORE
call MD // go to ER:  temperature,nausea,vomiting; check site daily for redness, swelling warmth, bleeding, drainage with foul odor

## 2025-05-05 NOTE — DISCHARGE NOTE PROVIDER - HOSPITAL COURSE
69M PMH gout, CAD s/p CABG 2023 (previously on Plavix, last dose 3 weeks ago), HLD, s/p ERCP and laparoscopic cholecystectomy for choledocholithiasis on 4/18, post-operative course complicated by biliary leak (CTAP and HIDA completed 4/20; CTAP showing hemoperitoneum in the LUQ, HIDA showing biliary leak) s/p cholangiogram with balloon sweep of clots, sludge and metal stent placement at [facility] for biliary leak on 4/21. Discharged on 4/23. Returns with generalized fatigue, low appetite, and low energy since being discharged home. Had concern for gout however uric acid level negative, however other outpatient levels significant for leukocytosis 17, Tbili 2.1 (Tbili at discharge on 4/23 was 2.5), . WBC 11.6.  In ED, afebrile, tachycardic to 114, SBP 110s.  CTAP with IV contrast and CTA chest showed No pulmonary embolism, however, interval development of a large 10.2 cm abscess in the cholecystectomy   bed.  Pt underwent Gallbladder fossa drainage on 5/2/25 in Interventional Radiology with Dr. Quesada with drain left in place. Pt will f/u with IR in 1 week with CT noncon A/P at that time    ID following patient, culture growing strep aginosus and rare alpha hemolytic strep, recommended _____________   69M PMH gout, CAD s/p CABG 2023 (previously on Plavix, last dose 3 weeks ago), HLD, s/p ERCP and laparoscopic cholecystectomy for choledocholithiasis on 4/18, post-operative course complicated by biliary leak (CTAP and HIDA completed 4/20; CTAP showing hemoperitoneum in the LUQ, HIDA showing biliary leak) s/p cholangiogram with balloon sweep of clots, sludge and metal stent placement at [facility] for biliary leak on 4/21. Discharged on 4/23. Returns with generalized fatigue, low appetite, and low energy since being discharged home. Had concern for gout however uric acid level negative, however other outpatient levels significant for leukocytosis 17, Tbili 2.1 (Tbili at discharge on 4/23 was 2.5), . WBC 11.6.  In ED, afebrile, tachycardic to 114, SBP 110s.  CTAP with IV contrast and CTA chest showed No pulmonary embolism, however, interval development of a large 10.2 cm abscess in the cholecystectomy   bed.  Pt underwent Gallbladder fossa drainage on 5/2/25 in Interventional Radiology with Dr. Quesada with drain left in place. Pt will f/u with IR in 1 week with CT noncon A/P at that time. Pt has appointment scheduled for 5/13/25    ID following patient, culture growing strep aginosus and rare alpha hemolytic strep, recommended discharge on 10 days of Augmentin 875 BID.  On day of discharge, pt in stable condition, VSS, afebrile, no white count.    69M PMH gout, CAD s/p CABG 2023 (previously on Plavix, last dose 3 weeks ago), HLD, s/p ERCP and laparoscopic cholecystectomy for choledocholithiasis on 4/18, post-operative course complicated by biliary leak (CTAP and HIDA completed 4/20; CTAP showing hemoperitoneum in the LUQ, HIDA showing biliary leak) s/p cholangiogram with balloon sweep of clots, sludge and metal stent placement at [facility] for biliary leak on 4/21. Discharged on 4/23. Returns with generalized fatigue, low appetite, and low energy since being discharged home. Had concern for gout however uric acid level negative, however other outpatient levels significant for leukocytosis 17, Tbili 2.1 (Tbili at discharge on 4/23 was 2.5), . WBC 11.6.  In ED, afebrile, tachycardic to 114, SBP 110s.  CTAP with IV contrast and CTA chest showed No pulmonary embolism, however, interval development of a large 10.2 cm abscess in the cholecystectomy   bed.  Pt underwent Gallbladder fossa drainage on 5/2/25 in Interventional Radiology with Dr. Quesada with drain left in place. Pt will f/u with IR in 1 week with CT noncon A/P at that time. Pt has appointment scheduled for 5/13/25    ID following patient, culture growing strep aginosus and rare alpha hemolytic strep, recommended discharge on 10 days of Augmentin 875 BID.  On day of discharge, pt in stable condition, VSS, afebrile, no white count.   Pt set up with homecare for IR drain and was provided with drain teaching prior to discharge.    69M PMH gout, CAD s/p CABG 2023 (previously on Plavix, last dose 3 weeks ago), HLD, s/p ERCP and laparoscopic cholecystectomy for choledocholithiasis on 4/18, post-operative course complicated by biliary leak (CTAP and HIDA completed 4/20; CTAP showing hemoperitoneum in the LUQ, HIDA showing biliary leak) s/p cholangiogram with balloon sweep of clots, sludge and metal stent placement at [facility] for biliary leak on 4/21. Discharged on 4/23. Returns with generalized fatigue, low appetite, and low energy since being discharged home. Had concern for gout however uric acid level negative, however other outpatient levels significant for leukocytosis 17, Tbili 2.1 (Tbili at discharge on 4/23 was 2.5), . WBC 11.6.  In ED, afebrile, tachycardic to 114, SBP 110s.  CTAP with IV contrast and CTA chest showed No pulmonary embolism, however, interval development of a large 10.2 cm abscess in the cholecystectomy   bed.  Pt underwent Gallbladder fossa drainage on 5/2/25 in Interventional Radiology with Dr. Quesada with drain left in place. Pt will f/u with IR in 1 week with CT noncon A/P at that time. Pt has appointment scheduled for 5/13/25    ID following patient, culture growing strep aginosus and rare alpha hemolytic strep, recommended discharge on 10 days of Augmentin 875 BID.  On day of discharge, pt in stable condition, VSS, afebrile, no white count.   Pt set up with homecare for IR drain and was provided with drain teaching prior to discharge.   Pt's ASA restarted 5/5, Plavix may restart 5/6

## 2025-05-05 NOTE — DISCHARGE NOTE PROVIDER - CARE PROVIDERS DIRECT ADDRESSES
,[email or web address]

## 2025-05-05 NOTE — DISCHARGE NOTE PROVIDER - PROVIDER TOKENS
PROVIDER:[TOKEN:[[id number]],FOLLOWUP:[1 week]] PROVIDER:[TOKEN:[[id number]],FOLLOWUP:[1 week]],PROVIDER:[TOKEN:[[id number]],FOLLOWUP:[1 week]],PROVIDER:[TOKEN:[[id number]],FOLLOWUP:[1 week]] PROVIDER:[TOKEN:[[id number]],SCHEDULEDAPPT:[05/13/2025]],PROVIDER:[TOKEN:[[id number]],FOLLOWUP:[1 week]],PROVIDER:[TOKEN:[[id number]],FOLLOWUP:[2 weeks]]

## 2025-05-05 NOTE — PROGRESS NOTE ADULT - ATTENDING COMMENTS
70 yo M h/o gout, CAD s/p CABG 2023, recent admission with choledocholithiasis s/p ERCP and laparoscopic cholecystectomy 4/18. Post op course complicated by duct of [place]ka biliary leak s/p repeat ERCP 4/21 with sweep of duct and placement of metal stent and gallbladder fossa abscess s/p IR drainage 5/2.     Feeling ok. Pain around IR drain. Up and walking around. Eating small amounts but poor appetite. No N/V.     Abdomen soft, mildly distended, well healed lap incisions, RUQ IR drain with serous fluid in bulb    WBC 5.7  Hb 10.4  T bili 0.7  Drain 20ml, serous     IR drainage cx with > 4 bacteria     Will discuss antibiotics regimen for home with ID  Continue zosyn for now  Resume aspirin, hold plavix   Discharge home once able per ID    I have seen and examined this patient on rounds this morning with the surgery team. I have reviewed all new labs, imaging and reports. I have participated in formulating the plan for the day, and have read and agree with the history, ROS, exam, assessment and plan as stated above.        Savanna Barry MD   Trauma and Acute Care Surgery

## 2025-05-05 NOTE — DISCHARGE NOTE PROVIDER - NSDCMRMEDTOKEN_GEN_ALL_CORE_FT
aspirin 81 mg oral tablet: 1 tab(s) orally once a day  clopidogrel 75 mg oral tablet: 1 tab(s) orally once a day   amoxicillin-clavulanate 875 mg-125 mg oral tablet: 1 tab(s) orally 2 times a day  aspirin 81 mg oral tablet: 1 tab(s) orally once a day  clopidogrel 75 mg oral tablet: 1 tab(s) orally once a day

## 2025-05-05 NOTE — PROGRESS NOTE ADULT - ASSESSMENT
69 m with HLD,CAD s/p CABG, s/p ERCP and laparoscopic cholecystectomy for choledocholithiasis on 4/18, c/b biliary leak (CTAP and HIDA completed 4/20; CTAP showing hemoperitoneum in the LUQ, HIDA showing biliary leak) s/p cholangiogram with balloon sweep of clots, sludge and metal stent placement at [facility] for biliary leak on 4/21, now with generalized fatigue, low appetite  febrile to 102.5, tachy, WBC: 17, Tbili 2.1 (Tbili at discharge on 4/23 was 2.5)  blood cx negative  CTA: No PE but interval development of a large 10.2 cm abscess in the cholecystectomy bed.  s/p IR drainage 5/2,  260 cc purulent fluid aspirated, cx with strep anginosus, strep constellatus alpha hem strep, Peptostreptococcus prevotella    laparoscopic cholecystectomy for choledocholithiasis on 4/18, c/b biliary leak s/p cholangiogram with balloon sweep of clots, sludge and metal stent  at [facility] on 4/21, now with fever, tachycardia, leukocytosis, large 10.2 cm abscess at the cholecystectomy bed  s/p IR drainage, 260 cc purulent fluid aspirated, cx >4 aerobic and anaerobic organisms, including strep anginosus, strep constellatus alpha hem strep, Peptostreptococcus prevotella    * c/w zosyn while in the hospital  * on discharge, switch to PO augmentin 875 bid for a 10 day course      The above assessment and plan was discussed with the primary team    Chen Mcmanus MD  contact on teams  After 5pm and on weekends call [phone]

## 2025-05-05 NOTE — DISCHARGE NOTE PROVIDER - CARE PROVIDER_API CALL
Tray Quesada  Vascular/Intervent Radiology  [address] [place] [address]  Phone: [phone]  Fax: [phone]  Follow Up Time: 1 week   Tray Quesada  Vascular/Intervent Radiology  [address] [place] [address]  Phone: [phone]  Fax: [phone]  Follow Up Time: 1 week    Chen Mcmanus)  Infectious Disease  [address] [place] [address]  Phone: [phone]  Fax: [phone]  Follow Up Time: 1 week    Savanna Barry  Surgical Critical Care  [address] [place] [id number]  Phone: [phone]  Fax: [phone]  Follow Up Time: 1 week   Tray Quesada  Vascular/Intervent Radiology  [address] [place] [address]  Phone: [phone]  Fax: [phone]  Scheduled Appointment: 05/13/2025    Chen Mcmanus)  Infectious Disease  [address] [place] [address]  Phone: [phone]  Fax: [phone]  Follow Up Time: 1 week    Savanna Barry  Surgical Critical Care  [address] [place] [address]  Phone: [phone]  Fax: [phone]  Follow Up Time: 2 weeks

## 2025-05-05 NOTE — DISCHARGE NOTE PROVIDER - NSDCCPTREATMENT_GEN_ALL_CORE_FT
PRINCIPAL PROCEDURE  Procedure: Percutaneous catheter drainage of fluid collection of abdominal or thoracic organ  Findings and Treatment: You had a gallbladder fossa drain placed by Dr. Quesada on 5/2 in Intervetnional Radiology (IR).   Monitor site for any sign or symptoms of infection (painful red skin, green/ yellow foul smelling discharge from the insertion site, fever, chills, leakage around drain site).   Flush drain with 5mL NS daily. If you meet resistance upon flushing, STOP and contact IR.   Empty drainage bag or bulb daily and record output. Once output is <10mL in a 24hr period or if there is a sudden decrease in output please contact IR to schedule  an appointment.  Drain care:  -Disconnect tubing (tube attached to bag/ bulb)  from the catheter (catheter going into skin)  -Clean catheter with alcohol swab  -Twist on the flush syringe to the catheter (be sure to push the air out of syringe)  -Flush catheter with 5mL (DO NOT pull back on syringe). If you meet resistance upon flushing, STOP and contact IR.   -Disconnect syringe from catheter and reconnect drainage bag or bulb.  Dressing care:  -Wash hands thoroughly with water and soap for at least 20 seconds.   -take off old dressing and clean around drain site with gauze soaked with warm water  -After cleaning the site, dry and replace dressing with a new gauze and tegaderm.   -Place one piece of gauze underneath the drain and another piece of gauze on top of drain.   -Apply tegaderm (clear dressing) on top.  -Change dressing every 3 days or when soiled     PRINCIPAL PROCEDURE  Procedure: Percutaneous catheter drainage of fluid collection of abdominal or thoracic organ  Findings and Treatment: You had a gallbladder fossa drain placed by Dr. Quesada on 5/2 in Intervetnional Radiology (IR).   Monitor site for any sign or symptoms of infection (painful red skin, green/ yellow foul smelling discharge from the insertion site, fever, chills, leakage around drain site).   Flush drain with 5mL NS daily. If you meet resistance upon flushing, STOP and contact IR.   Empty drainage bag or bulb daily and record output. Once output is <10mL in a 24hr period or if there is a sudden decrease in output please contact IR to schedule  an appointment.  Drain care:  -Disconnect tubing (tube attached to bag/ bulb)  from the catheter (catheter going into skin)  -Clean catheter with alcohol swab  -Twist on the flush syringe to the catheter (be sure to push the air out of syringe)  -Flush catheter with 5mL (DO NOT pull back on syringe). If you meet resistance upon flushing, STOP and contact IR.   -Disconnect syringe from catheter and reconnect drainage bag or bulb.  Dressing care:  -Wash hands thoroughly with water and soap for at least 20 seconds.   -take off old dressing and clean around drain site with gauze soaked with warm water  -After cleaning the site, dry and replace dressing with a new gauze and tegaderm.   -Place one piece of gauze underneath the drain and another piece of gauze on top of drain.   -Apply tegaderm (clear dressing) on top.  -Change dressing every 3 days or when soiled  You are scheduled for a CT non-con abdomen/pelvis at on 5/13/25 at 1:30pm followed by drain evaluation at 2pm, please arrive at 1pm     PRINCIPAL PROCEDURE  Procedure: Percutaneous catheter drainage of fluid collection of abdominal or thoracic organ  Findings and Treatment: You had a gallbladder fossa drain placed by Dr. Quesada on 5/2 in Interventional Radiology (IR).   Monitor site for any sign or symptoms of infection (painful red skin, green/ yellow foul smelling discharge from the insertion site, fever, chills, leakage around drain site).   Flush drain with 5mL NS daily. If you meet resistance upon flushing, STOP and contact IR.   Empty drainage bag or bulb daily and record output. Once output is <10mL in a 24hr period or if there is a sudden decrease in output please contact IR to schedule  an appointment.  Drain care:  -Disconnect tubing (tube attached to bag/ bulb)  from the catheter (catheter going into skin)  -Clean catheter with alcohol swab  -Twist on the flush syringe to the catheter (be sure to push the air out of syringe)  -Flush catheter with 5mL (DO NOT pull back on syringe). If you meet resistance upon flushing, STOP and contact IR.   -Disconnect syringe from catheter and reconnect drainage bag or bulb.  Dressing care:  -Wash hands thoroughly with water and soap for at least 20 seconds.   -take off old dressing and clean around drain site with gauze soaked with warm water  -After cleaning the site, dry and replace dressing with a new gauze and tegaderm.   -Place one piece of gauze underneath the drain and another piece of gauze on top of drain.   -Apply tegaderm (clear dressing) on top.  -Change dressing every 3 days or when soiled  You are scheduled for a CT non-con abdomen/pelvis at on 5/13/25 at 1:30pm followed by drain evaluation at 2pm, please arrive at 1pm  Your Aspirin was restarted on 5/5.  You may resume your Plavix 5/6

## 2025-05-05 NOTE — PROGRESS NOTE ADULT - ASSESSMENT
69M PMH gout, CAD s/p CABG 2023 (previously on Plavix, last dose 3 weeks ago), HLD, s/p ERCP and laparoscopic cholecystectomy for choledocholithiasis on 4/18, post-operative course complicated by biliary leak (CTAP and HIDA completed 4/20; CTAP showing hemoperitoneum in the LUQ, HIDA showing biliary leak) s/p cholangiogram with balloon sweep of clots, sludge and metal stent placement at [facility] for biliary leak on 4/21. Now presenting with 10 cm abscess in gallbladder bed. s/p IR drainage of GB fossa abscess 5/2, progressing well. Pending abscess culture speciation/sensitivities.    Plan   -Reg diet  -Zosyn, appreciate recs per ID  -Monitor drain output  -lovenox VTE ppx   -Pain control PRN  -Plavix being held  -Dispo planning     ACS  [id number]

## 2025-05-05 NOTE — DISCHARGE NOTE NURSING/CASE MANAGEMENT/SOCIAL WORK - NSDCFUADDAPPT_GEN_ALL_CORE_FT
Please follow up with Interventional radiology (IR) in 1 week for drain evaluation. You are scheduled for a CT non-con abdomen/pelvis at on 5/13/25 at 1:30pm followed by drain evaluation at 2pm, please arrive at 1pm. If need to reschedule you can call the IR booking office at [phone] to schedule. Please feel free to contact us at [phone] if any problems arise. After 6PM, Monday through Friday, on weekends and on holidays, please call [phone] and ask for the radiology resident on call to be paged.

## 2025-05-05 NOTE — DISCHARGE NOTE PROVIDER - NSDCHHATTENDCERT_GEN_ALL_CORE
negative - no vomiting, no diarrhea
My signature below certifies that the above stated patient is homebound and upon completion of the Face-To-Face encounter, has the need for intermittent skilled nursing, physical therapy and/or speech or occupational therapy services in their home for their current diagnosis as outlined in their initial plan of care. These services will continue to be monitored by myself or another physician.

## 2025-05-05 NOTE — PROGRESS NOTE ADULT - REASON FOR ADMISSION
gallbladder fossa abscess

## 2025-05-05 NOTE — DISCHARGE NOTE PROVIDER - NSDCFUADDAPPT_GEN_ALL_CORE_FT
Please follow up with Interventional radiology (IR) in 1 week for drain evaluation. You will need to have a CT non-con abdomen/pelvis at time of appointment. Please call IR booking office at [phone] to schedule. Please feel free to contact us at [phone] if any problems arise. After 6PM, Monday through Friday, on weekends and on holidays, please call [phone] and ask for the radiology resident on call to be paged.  Please follow up with Interventional radiology (IR) in 1 week for drain evaluation. You are scheduled for a CT non-con abdomen/pelvis at 1:30pm followed by drain evaluation at 2pm, please arrive at 1pm. If need to reschedule you can call the IR booking office at [phone] to schedule. Please feel free to contact us at [phone] if any problems arise. After 6PM, Monday through Friday, on weekends and on holidays, please call [phone] and ask for the radiology resident on call to be paged.  Please follow up with Interventional radiology (IR) in 1 week for drain evaluation. You are scheduled for a CT non-con abdomen/pelvis at on 5/13/25 at 1:30pm followed by drain evaluation at 2pm, please arrive at 1pm. If need to reschedule you can call the IR booking office at [phone] to schedule. Please feel free to contact us at [phone] if any problems arise. After 6PM, Monday through Friday, on weekends and on holidays, please call [phone] and ask for the radiology resident on call to be paged.

## 2025-05-06 DIAGNOSIS — R18.8 OTHER ASCITES: ICD-10-CM

## 2025-05-06 LAB
CULTURE RESULTS: SIGNIFICANT CHANGE UP
CULTURE RESULTS: SIGNIFICANT CHANGE UP
SPECIMEN SOURCE: SIGNIFICANT CHANGE UP
SPECIMEN SOURCE: SIGNIFICANT CHANGE UP

## 2025-05-08 LAB — HLA-B58 MUTATION ANALYSIS: NORMAL

## 2025-05-13 ENCOUNTER — OUTPATIENT (OUTPATIENT)
Dept: OUTPATIENT SERVICES | Facility: HOSPITAL | Age: 70
LOS: 1 days | End: 2025-05-13
Payer: MEDICARE

## 2025-05-13 ENCOUNTER — APPOINTMENT (OUTPATIENT)
Dept: CT IMAGING | Facility: HOSPITAL | Age: 70
End: 2025-05-13

## 2025-05-13 ENCOUNTER — TRANSCRIPTION ENCOUNTER (OUTPATIENT)
Age: 70
End: 2025-05-13

## 2025-05-13 VITALS
HEART RATE: 79 BPM | RESPIRATION RATE: 20 BRPM | SYSTOLIC BLOOD PRESSURE: 135 MMHG | DIASTOLIC BLOOD PRESSURE: 80 MMHG | HEIGHT: 69 IN | WEIGHT: 179.9 LBS | TEMPERATURE: 97 F | OXYGEN SATURATION: 98 %

## 2025-05-13 DIAGNOSIS — Z98.890 OTHER SPECIFIED POSTPROCEDURAL STATES: Chronic | ICD-10-CM

## 2025-05-13 DIAGNOSIS — R18.8 OTHER ASCITES: ICD-10-CM

## 2025-05-13 DIAGNOSIS — Z95.1 PRESENCE OF AORTOCORONARY BYPASS GRAFT: Chronic | ICD-10-CM

## 2025-05-13 DIAGNOSIS — Z98.61 CORONARY ANGIOPLASTY STATUS: Chronic | ICD-10-CM

## 2025-05-13 PROCEDURE — 74150 CT ABDOMEN W/O CONTRAST: CPT | Mod: 26

## 2025-05-13 PROCEDURE — 76080 X-RAY EXAM OF FISTULA: CPT

## 2025-05-13 PROCEDURE — 49424 ASSESS CYST CONTRAST INJECT: CPT

## 2025-05-13 PROCEDURE — 76080 X-RAY EXAM OF FISTULA: CPT | Mod: 26

## 2025-05-13 PROCEDURE — C1769: CPT

## 2025-05-13 PROCEDURE — 74150 CT ABDOMEN W/O CONTRAST: CPT

## 2025-05-13 RX ORDER — CLOPIDOGREL BISULFATE 75 MG/1
1 TABLET, FILM COATED ORAL
Refills: 0 | DISCHARGE

## 2025-05-13 RX ORDER — ASPIRIN 325 MG
1 TABLET ORAL
Refills: 0 | DISCHARGE

## 2025-05-13 NOTE — ASU DISCHARGE PLAN (ADULT/PEDIATRIC) - PATIENT BELONGINGS
Addended by: DAMON EM on: 1/21/2021 10:53 AM     Modules accepted: Orders     Patient's belongings returned

## 2025-05-13 NOTE — ASU DISCHARGE PLAN (ADULT/PEDIATRIC) - ASU DC SPECIAL INSTRUCTIONSFT
Drain Check    Discharge Instructions  - You have had a drain checked.  - Keep the area clean and dry.  - Do not soak in a tub or pool with the drain, however you may shower with the drain and dressing covered in plastic wrap.  - Do not put traction on the drain and be careful that the drain does not get accidentally dislodged or kinked.  - Record output daily from the drain. Empty the bag as needed.  - You may resume your normal diet.  - You may resume your normal medications.  - It is normal to experience some pain over the site for the next few days. You may take apply ice to the area (20 minutes on, 20 minutes off) and take Tylenol for that pain. Do not take more frequently than every 6 hours and do not exceed more than 3000mg of Tylenol in a 24 hour period.    Notify your primary physician and/or Interventional Radiology IMMEDIATELY if you experience any of the following       - Fever of 100.4F  or 38C       - Chills or Rigors/ Shakes       - Swelling and/or Redness in the area of the puncture site       - Worsening Pain       - Blood soaked bandages or worsening bleeding       - Lightheadedness and/or dizziness upon standing       - Chest Pain/ Tightness       - Shortness of Breath       - Difficulty walking    If you have a problem that you believe requires IMMEDIATE attention, please go to your NEAREST Emergency Room. If you believe your problem can safely wait until you speak to a physician, please call Interventional Radiology for any concerns.    Please feel free to contact us at [phone] if any problems arise. After 6PM, Monday through Friday, on weekends and on holidays, please call [phone] and ask for the radiology resident on call to be paged. Drain Check and removal    Discharge Instructions  - You have had a drain checked and removed  - Keep the area clean and dry.  - Do not soak in a tub or pool with the drain, however you may shower with the drain and dressing covered in plastic wrap.  - You may resume your normal diet.  - You may resume your normal medications.  - It is normal to experience some pain over the site for the next few days. You may take apply ice to the area (20 minutes on, 20 minutes off) and take Tylenol for that pain. Do not take more frequently than every 6 hours and do not exceed more than 3000mg of Tylenol in a 24 hour period.    Notify your primary physician and/or Interventional Radiology IMMEDIATELY if you experience any of the following       - Fever of 100.4F  or 38C       - Chills or Rigors/ Shakes       - Swelling and/or Redness in the area of the puncture site       - Worsening Pain       - Blood soaked bandages or worsening bleeding       - Lightheadedness and/or dizziness upon standing       - Chest Pain/ Tightness       - Shortness of Breath       - Difficulty walking    If you have a problem that you believe requires IMMEDIATE attention, please go to your NEAREST Emergency Room. If you believe your problem can safely wait until you speak to a physician, please call Interventional Radiology for any concerns.    Please feel free to contact us at [phone] if any problems arise. After 6PM, Monday through Friday, on weekends and on holidays, please call [phone] and ask for the radiology resident on call to be paged.

## 2025-05-13 NOTE — PRE PROCEDURE NOTE - PRE PROCEDURE EVALUATION
Interventional Radiology    HPI: 69M PMH gout, CAD s/p CABG 2023 (previously on Plavix, last dose 3 weeks ago), HLD, s/p ERCP and laparoscopic cholecystectomy for choledocholithiasis on 4/18, post-operative course complicated by biliary leak (CTAP and HIDA completed 4/20; CTAP showing hemoperitoneum in the LUQ, HIDA showing biliary leak) s/p cholangiogram with balloon sweep of clots, sludge and metal stent placement at [facility] for biliary leak on 4/21. Now presenting with 10 cm abscess in gallbladder bed. s/p IR drainage of GB fossa abscess 5/2 (260 cc purulent fluid aspirated. ) Presents to IR for evaluation possible removal; reports <5cc output x 3 days; denies fever chills nausea vomiting     Review of Systems:   Constitutional: No fever, weight loss, or fatigue  Neurological: No headaches, memory loss, loss of strength, numbness, or tremors  Respiratory: No cough, wheezing, chills or hemoptysis; No shortness of breath  Cardiovascular: No chest pain, palpitations, dizziness, or leg swelling  Gastrointestinal: No abdominal or epigastric pain. No nausea, vomiting, or hematemesis; No diarrhea or constipation. No melena or hematochezia.    Allergies: No Known Allergies    Medications (Abx/Cardiac/Anticoagulation/Blood Products)      Data:  175.3  81.6  T(C): 36.2  HR: 79  BP: 135/80  RR: 20  SpO2: 98%    Physical Exam  General: No acute distress, nontoxic, A&Ox3  Chest: Non labored breathing  Abdomen: Non-distended, non-tender, no preitoneal signs    RADIOLOGY & ADDITIONAL TESTS:    Imaging Reviewed    H & P Note Reviewed from: 5/1    Plan: 69y Male presents for GB Fossa drain evaluation possible removal  -Risks/Benefits/alternatives explained with the patient and/or healthcare proxy and witnessed informed consent obtained.

## 2025-05-13 NOTE — PROCEDURE NOTE - PROCEDURE FINDINGS AND DETAILS
successful tube evaluation demonstrating near complete resolution of prior abscess without evidence of communication or fistula to adjacent structure; drain removed over wire; full report to follow

## 2025-05-13 NOTE — ASU PREOP CHECKLIST - BP NONINVASIVE DIASTOLIC (MM HG)
80
[FreeTextEntry1] : Proceed with vestibular rehabilitation as planned.\par Neuropsychological evaluation is scheduled.

## 2025-05-13 NOTE — ASU DISCHARGE PLAN (ADULT/PEDIATRIC) - NURSING INSTRUCTIONS
Please feel free to contact us at [phone] if any problems arise. After 6PM, Monday through Friday, on weekends and on holidays, please call [phone] and ask for the radiology resident on call to be paged.

## 2025-05-13 NOTE — ASU PATIENT PROFILE, ADULT - FALL HARM RISK - HARM RISK INTERVENTIONS

## 2025-05-13 NOTE — ASU DISCHARGE PLAN (ADULT/PEDIATRIC) - FINANCIAL ASSISTANCE
[facility] provides services at a reduced cost to those who are determined to be eligible through [facility]’s financial assistance program. Information regarding [facility]’s financial assistance program can be found by going to [email or web address] or by calling [phone].

## 2025-05-14 ENCOUNTER — APPOINTMENT (OUTPATIENT)
Dept: TRAUMA SURGERY | Facility: CLINIC | Age: 70
End: 2025-05-14

## 2025-05-16 ENCOUNTER — OUTPATIENT (OUTPATIENT)
Dept: OUTPATIENT SERVICES | Facility: HOSPITAL | Age: 70
LOS: 1 days | End: 2025-05-16
Payer: MEDICARE

## 2025-05-16 ENCOUNTER — APPOINTMENT (OUTPATIENT)
Dept: CT IMAGING | Facility: CLINIC | Age: 70
End: 2025-05-16

## 2025-05-16 DIAGNOSIS — Z95.1 PRESENCE OF AORTOCORONARY BYPASS GRAFT: Chronic | ICD-10-CM

## 2025-05-16 DIAGNOSIS — Z00.8 ENCOUNTER FOR OTHER GENERAL EXAMINATION: ICD-10-CM

## 2025-05-16 DIAGNOSIS — Z98.890 OTHER SPECIFIED POSTPROCEDURAL STATES: Chronic | ICD-10-CM

## 2025-05-16 DIAGNOSIS — Z98.61 CORONARY ANGIOPLASTY STATUS: Chronic | ICD-10-CM

## 2025-05-16 PROCEDURE — 73700 CT LOWER EXTREMITY W/O DYE: CPT | Mod: 26,RT,76

## 2025-05-16 PROCEDURE — 73700 CT LOWER EXTREMITY W/O DYE: CPT

## 2025-05-17 ENCOUNTER — TRANSCRIPTION ENCOUNTER (OUTPATIENT)
Age: 70
End: 2025-05-17

## 2025-05-29 ENCOUNTER — NON-APPOINTMENT (OUTPATIENT)
Age: 70
End: 2025-05-29

## 2025-05-30 ENCOUNTER — APPOINTMENT (OUTPATIENT)
Dept: RHEUMATOLOGY | Facility: CLINIC | Age: 70
End: 2025-05-30

## 2025-06-18 ENCOUNTER — APPOINTMENT (OUTPATIENT)
Dept: GASTROENTEROLOGY | Facility: CLINIC | Age: 70
End: 2025-06-18

## 2025-07-29 ENCOUNTER — OUTPATIENT (OUTPATIENT)
Dept: OUTPATIENT SERVICES | Facility: HOSPITAL | Age: 70
LOS: 1 days | End: 2025-07-29
Payer: MEDICARE

## 2025-07-29 ENCOUNTER — TRANSCRIPTION ENCOUNTER (OUTPATIENT)
Age: 70
End: 2025-07-29

## 2025-07-29 ENCOUNTER — APPOINTMENT (OUTPATIENT)
Dept: GASTROENTEROLOGY | Facility: HOSPITAL | Age: 70
End: 2025-07-29

## 2025-07-29 ENCOUNTER — RESULT REVIEW (OUTPATIENT)
Age: 70
End: 2025-07-29

## 2025-07-29 VITALS
HEART RATE: 65 BPM | OXYGEN SATURATION: 96 % | TEMPERATURE: 97 F | WEIGHT: 199.96 LBS | RESPIRATION RATE: 17 BRPM | DIASTOLIC BLOOD PRESSURE: 77 MMHG | SYSTOLIC BLOOD PRESSURE: 151 MMHG | HEIGHT: 69 IN

## 2025-07-29 VITALS
HEART RATE: 56 BPM | DIASTOLIC BLOOD PRESSURE: 77 MMHG | OXYGEN SATURATION: 95 % | RESPIRATION RATE: 15 BRPM | SYSTOLIC BLOOD PRESSURE: 138 MMHG

## 2025-07-29 DIAGNOSIS — Z98.61 CORONARY ANGIOPLASTY STATUS: Chronic | ICD-10-CM

## 2025-07-29 DIAGNOSIS — R18.8 OTHER ASCITES: ICD-10-CM

## 2025-07-29 DIAGNOSIS — Z98.890 OTHER SPECIFIED POSTPROCEDURAL STATES: Chronic | ICD-10-CM

## 2025-07-29 DIAGNOSIS — K83.1 OBSTRUCTION OF BILE DUCT: ICD-10-CM

## 2025-07-29 DIAGNOSIS — Z95.1 PRESENCE OF AORTOCORONARY BYPASS GRAFT: Chronic | ICD-10-CM

## 2025-07-29 PROCEDURE — 0753T DGTZ GLS MCRSCP SLD LEVEL IV: CPT

## 2025-07-29 PROCEDURE — C1769: CPT

## 2025-07-29 PROCEDURE — 43275 ERCP REMOVE FORGN BODY DUCT: CPT

## 2025-07-29 PROCEDURE — 43264 ERCP REMOVE DUCT CALCULI: CPT

## 2025-07-29 PROCEDURE — 74330 X-RAY BILE/PANC ENDOSCOPY: CPT

## 2025-07-29 PROCEDURE — 43273 ENDOSCOPIC PANCREATOSCOPY: CPT

## 2025-07-29 PROCEDURE — 43264 ERCP REMOVE DUCT CALCULI: CPT | Mod: GC

## 2025-07-29 PROCEDURE — 88305 TISSUE EXAM BY PATHOLOGIST: CPT

## 2025-07-29 PROCEDURE — 88305 TISSUE EXAM BY PATHOLOGIST: CPT | Mod: 26

## 2025-07-29 PROCEDURE — 43275 ERCP REMOVE FORGN BODY DUCT: CPT | Mod: GC

## 2025-07-29 PROCEDURE — 43239 EGD BIOPSY SINGLE/MULTIPLE: CPT

## 2025-07-29 PROCEDURE — 43239 EGD BIOPSY SINGLE/MULTIPLE: CPT | Mod: GC

## 2025-07-29 PROCEDURE — C9399: CPT

## 2025-07-29 PROCEDURE — 76000 FLUOROSCOPY <1 HR PHYS/QHP: CPT | Mod: 26,59,GC

## 2025-07-29 DEVICE — IMPLANTABLE DEVICE: Type: IMPLANTABLE DEVICE | Status: FUNCTIONAL

## 2025-07-29 DEVICE — BLLN EXTRACT FUSION QUATRO 8.5 10 12 15MM: Type: IMPLANTABLE DEVICE | Status: FUNCTIONAL

## 2025-07-29 DEVICE — AUTOTOME CANNULATING SPHINCTEROTOME RX 44 20MM: Type: IMPLANTABLE DEVICE | Status: FUNCTIONAL

## 2025-07-29 DEVICE — GWIRE JAG REVOLUTION 260CM: Type: IMPLANTABLE DEVICE | Status: FUNCTIONAL

## 2025-08-01 LAB — SURGICAL PATHOLOGY STUDY: SIGNIFICANT CHANGE UP

## (undated) DEVICE — ELCTR MULTIFUNCTION DEFIBRILLATION ELECTRODE EDGE SYSTEM ADULT

## (undated) DEVICE — NDL INJ SCLERO INTERJECT 25G

## (undated) DEVICE — SUT PROLENE 4-0 36" SH

## (undated) DEVICE — GOWN TRIMAX LG

## (undated) DEVICE — PACK W INSPIRE OXYGENATOR W HEMOCONCENTRATOR

## (undated) DEVICE — TUBING MEDI-VAC W MAXIGRIP CONNECTORS 1/4"X6'

## (undated) DEVICE — PAPIL BILRTH II 6-5FRX0.035IN

## (undated) DEVICE — VESSEL LOOP MAXI-RED  0.120" X 16"

## (undated) DEVICE — TUBING STRYKEFLOW II SUCTION / IRRIGATOR

## (undated) DEVICE — SUT PROLENE 4-0 36" RB-1

## (undated) DEVICE — CATH IV SAFE BC 24G X 0.75" (YELLOW)

## (undated) DEVICE — SUT BOOT STANDARD (ASSORTED) 5 PAIR

## (undated) DEVICE — SUT DOUBLE 6 WIRE STERNAL

## (undated) DEVICE — STEALTH CLAMP INSERT FIBRA/FIBRA 90MM

## (undated) DEVICE — CLAMP BULLDOG MIDI STRAIGHT (WHITE) DISP

## (undated) DEVICE — POSITIONER FOAM EGG CRATE ULNAR 2PCS (PINK)

## (undated) DEVICE — Device

## (undated) DEVICE — NDL INJ SCLERO INTERJECT 23G

## (undated) DEVICE — PACK OPEN HEART VAMP PLUS

## (undated) DEVICE — SNARE POLYP SENS SM 13MM 240CM

## (undated) DEVICE — PHRENIC NERVE PAD MEDIUM

## (undated) DEVICE — DRAPE INSTRUMENT POUCH 6.75" X 11"

## (undated) DEVICE — SPHINCTEROTOME CLEVERCUT WIRE 25MM  2.8MM X 170CM

## (undated) DEVICE — ELCTR BOVIE BLADE 3/4" EXTENDED LENGTH 6"

## (undated) DEVICE — BIOPSY FORCEP RADIAL JAW 4 STANDARD WITH NEEDLE

## (undated) DEVICE — OLYMPUS DISTAL COVER ENDOSCOPE

## (undated) DEVICE — TUBING IV SET GRAVITY 3Y 100" MACRO

## (undated) DEVICE — SENSOR O2 FINGER ADULT

## (undated) DEVICE — SUT VICRYL 3-0 27" CT-1

## (undated) DEVICE — SUT PLEDGET 9MM X 4MM X 1.5MM

## (undated) DEVICE — SOL INJ NS 0.9% 500ML 1-PORT

## (undated) DEVICE — CHEST DRAIN PLEUR-EVAC DRY/WET ADULT-PEDS SINGLE (QUICK)

## (undated) DEVICE — PREP SCRUB BRUSH W CHG 4%

## (undated) DEVICE — BRUSH CYTO RAP EXCHG 3MM

## (undated) DEVICE — ELCTR GROUNDING PAD ADULT COVIDIEN

## (undated) DEVICE — CLEANER FOR ELCTR TIP

## (undated) DEVICE — TUBING SUCTION 20FT

## (undated) DEVICE — CONNECTOR STRAIGHT 1/4 X 3/8"

## (undated) DEVICE — SNARE CAPTIVATOR II RND STIFF 15MM

## (undated) DEVICE — SUCTION YANKAUER NO CONTROL VENT

## (undated) DEVICE — DVC AUTO CAP RX LOKG

## (undated) DEVICE — LITHOTRIPY BASKET TRAPEZOID 2.5CM

## (undated) DEVICE — DRSG ALLEVYN LIFE SACRUM 6.75 X 6.75 (PINK)

## (undated) DEVICE — GLV 7.5 SENSICARE W ALOE

## (undated) DEVICE — DRAPE TOWEL BLUE 17" X 24"

## (undated) DEVICE — DRSG TAPE TRANSPORE 3"

## (undated) DEVICE — MAXI-FLO SUCTION CATHETER KIT 14FR STRAIGHT

## (undated) DEVICE — DRSG MEPILEX 10 X 10CM (4 X 4") AG

## (undated) DEVICE — BITE BLOCK ADULT 20 X 27MM (GREEN)

## (undated) DEVICE — SUT BIOSYN 4-0 18" P-12

## (undated) DEVICE — SUT PROLENE 3-0 36" MH

## (undated) DEVICE — SUT VICRYL 2-0 27" CT-1 UNDYED

## (undated) DEVICE — SUT SILK 4-0 30" RB-1

## (undated) DEVICE — DRSG OPSITE 2.5 X 2"

## (undated) DEVICE — GLV 6 PROTEXIS (WHITE)

## (undated) DEVICE — SOL INJ NS 0.9% 1000ML

## (undated) DEVICE — TUBING INSUFFLATION LAP FILTER 10FT

## (undated) DEVICE — DRAPE SLUSH / WARMER 44 X 66"

## (undated) DEVICE — ENDOCATCH GENERAL 10MM (PURPLE)

## (undated) DEVICE — ELCTR BOVIE PENCIL SMOKE EVACUATION

## (undated) DEVICE — BALLOON US ENDO

## (undated) DEVICE — SUT PROLENE 5-0 36" C-1

## (undated) DEVICE — INSUFFLATION NDL COVIDIEN STEP 14G FOR STEP/VERSASTEP

## (undated) DEVICE — SYNOVIS VASCULAR PROBE 1.5MM 15CM

## (undated) DEVICE — TUBING IV SET SECOND 34" W/O LOK-BLUNT

## (undated) DEVICE — GLV 7.5 PROTEXIS (WHITE)

## (undated) DEVICE — BRUSH COLONOSCOPY CYTOLOGY

## (undated) DEVICE — MEDICATION LABELS W MARKER

## (undated) DEVICE — SOL ANTI FOG

## (undated) DEVICE — NDL PERC BASEPLT 18GX7CM

## (undated) DEVICE — SUT SILK 5-0 60" TIES

## (undated) DEVICE — SUT ETHIBOND 5 4-30" CCS

## (undated) DEVICE — GOWN XL W TOWEL

## (undated) DEVICE — POSITIONER FOAM HEAD CRADLE (PINK)

## (undated) DEVICE — TUBING TRUWAVE PRESSURE MALE/FEMALE 72"

## (undated) DEVICE — SUT SILK 0 30" KS

## (undated) DEVICE — SUT MONOCRYL 4-0 27" PS-2 UNDYED

## (undated) DEVICE — SUT VICRYL 0 54" REEL UNDYED

## (undated) DEVICE — VENTING ADAPTER "Y" (RED/BLUE) 7.5"

## (undated) DEVICE — SUT PROLENE 5-0 30" RB-2

## (undated) DEVICE — GLV 6.5 PROTEXIS (WHITE)

## (undated) DEVICE — CATH IV SAFE BC 22G X 1" (BLUE)

## (undated) DEVICE — SUT ETHIBOND 2-0 36" SH

## (undated) DEVICE — DRAPE C ARM UNIVERSAL

## (undated) DEVICE — TUBING TUR 2 PRONG

## (undated) DEVICE — PREP CHLORAPREP HI-LITE ORANGE 26ML

## (undated) DEVICE — BLADE SURGICAL #20 CARBON

## (undated) DEVICE — TROCAR COVIDIEN VERSAPORT BLADELESS OPTICAL 5MM STANDARD

## (undated) DEVICE — TRAY IRRIGATION SYR BULB 60CC

## (undated) DEVICE — SNARE POLYP MINI ACCUSNR 1.5 X 3CM

## (undated) DEVICE — BEAVER BLADE MINI SHARP ALL ROUND (BLUE)

## (undated) DEVICE — SWITCH ARISS TABLE MOUNT UNEQUAL LEGS 13"

## (undated) DEVICE — SET BLOOD Y-TYPE

## (undated) DEVICE — VISITEC 4X4

## (undated) DEVICE — FOLEY TRAY 16FR 5CC LF LUBRISIL ADVANCE TEMP CLOSED

## (undated) DEVICE — GLV 8.5 PROTEXIS (WHITE)

## (undated) DEVICE — SUT ETHIBOND 3-0 36" RB-1

## (undated) DEVICE — SOL IRR POUR H2O 250ML

## (undated) DEVICE — SAW BLADE STRYKER FAN OFFSET 20 X 41 X 0.38MM

## (undated) DEVICE — SENSOR MYOCARDIAL TEMP 15MM

## (undated) DEVICE — SUT ETHIBOND 2-0 30" V5 WHITE

## (undated) DEVICE — GETINGE VASOVIEW 7 ENDOSCOPIC VESSEL HARVESTING SYSTEM

## (undated) DEVICE — NDL TAPR FR EYE 1/2 CIR 3

## (undated) DEVICE — DISSECTOR ENDO PEANUT 5MM

## (undated) DEVICE — CATH IV SAFE BC 20G X 1.16" (PINK)

## (undated) DEVICE — PACK IV START WITH CHG

## (undated) DEVICE — GLV 6.5 PROTEXIS (BLUE)

## (undated) DEVICE — TUBING ALARIS PUMP MODULE NON-DEHP

## (undated) DEVICE — PACK ADVANCED LAPAROSCOPIC NS

## (undated) DEVICE — SPECIMEN CONTAINER 100ML

## (undated) DEVICE — STEALTH CLAMP INSERT FIBRA/FIBRA 60MM

## (undated) DEVICE — STAPLER SKIN PROXIMATE

## (undated) DEVICE — SOL IRR POUR NS 0.9% 500ML

## (undated) DEVICE — SUT SILK 0 30" TIES

## (undated) DEVICE — SUT PROLENE 7-0 30" BV-1

## (undated) DEVICE — DRSG CURITY GAUZE SPONGE 4 X 4" 12-PLY

## (undated) DEVICE — SUT VICRYL 1 36" CTX UNDYED

## (undated) DEVICE — WARMING BLANKET DUO-THERM HYPER/HYPOTHERM ADULT

## (undated) DEVICE — SUT PROLENE 5-0 36" RB-1

## (undated) DEVICE — SUT ETHIBOND 2-0 30" SH-1 GREEN/WHITE

## (undated) DEVICE — GLV 8 PROTEXIS (WHITE)

## (undated) DEVICE — DRSG TEGADERM 4X4.75"

## (undated) DEVICE — SUT ETHIBOND 2-0 4-30" RB-1 WHITE

## (undated) DEVICE — SYR LUER LOK 20CC

## (undated) DEVICE — SUT PROLENE 7-0 24" BV175-6

## (undated) DEVICE — FOLEY HOLDER STATLOCK 2 WAY ADULT

## (undated) DEVICE — D HELP - CLEARVIEW CLEARIFY SYSTEM

## (undated) DEVICE — SUT PROLENE 3-0 36" SH

## (undated) DEVICE — AORTIC PUNCH 5MM STANDARD HANDLE

## (undated) DEVICE — GLV 7 PROTEXIS (WHITE)

## (undated) DEVICE — AORTIC PUNCH 4.0MM STANDARD HANDLE

## (undated) DEVICE — SUT PERMAHAND SILK 2 60" TIES

## (undated) DEVICE — STABILIZER HAND ASSISTANT ATTACHMENT W STABLESOFT 2L

## (undated) DEVICE — FRAZIER SUCTION TIP 10FR

## (undated) DEVICE — DRAPE IOBAN 33" X 23"

## (undated) DEVICE — DRAPE CV 106" X 135"

## (undated) DEVICE — RANGER BLOOD/FLUID WARMING SET DISP

## (undated) DEVICE — PRESSURE INFUSOR BAG 1000ML

## (undated) DEVICE — SUT SILK 2 30" MH

## (undated) DEVICE — SUT ETHIBOND 1 30" OS6

## (undated) DEVICE — GLV 7 PROTEXIS (BLUE)

## (undated) DEVICE — STABILIZER HAND ASSISTANT ATTACHMENT W STABLESOFT 2S

## (undated) DEVICE — SUT MONOCRYL 3-0 27" PS-2 UNDYED

## (undated) DEVICE — SUT PDS II 2-0 27" CT-1

## (undated) DEVICE — SUT PROLENE 7-0 24" BV-1

## (undated) DEVICE — PACK VALVE

## (undated) DEVICE — MULTIPLE PERFUSION SET FEMALE 1 INLET LEG W 4 LEGS 15" (BLUE/RED)

## (undated) DEVICE — SYR ALLIANCE II INFLATION 60ML

## (undated) DEVICE — TROCAR COVIDIEN BLUNT TIP HASSAN 12MM

## (undated) DEVICE — STOPCOCK 3 WAY W TUBE 35"

## (undated) DEVICE — DRSG MEPILEX 10 X 30CM (4 X 12") AG

## (undated) DEVICE — TAPE UMBILICAL 1/8 X 30" STRANDS

## (undated) DEVICE — SAW BLADE STRYKER STERNUM 31MM X 6.27 X .79

## (undated) DEVICE — SUT PROLENE 4-0 30" SH-1

## (undated) DEVICE — SOL INJ NS 0.9% 500ML 2 PORT

## (undated) DEVICE — SUT PROLENE 6-0 24" C-1

## (undated) DEVICE — SUT SILK 0 30" SH

## (undated) DEVICE — SUT ETHIBOND 4-0 36" RB-1

## (undated) DEVICE — NDL COUNTER FOAM AND MAGNET 40-70

## (undated) DEVICE — ELCTR LAPAROSCOPIC MONOPOLAR CORD

## (undated) DEVICE — SUT SILK 2-0 18" SH (POP-OFF)

## (undated) DEVICE — CONNECTOR STRAIGHT 3/8 X 1/2"

## (undated) DEVICE — SUT VICRYL 2 27" TP-1 UNDYED

## (undated) DEVICE — PRESSURE INFUSOR BAG 500ML

## (undated) DEVICE — BLADE SURGICAL #11 CARBON

## (undated) DEVICE — SPONGE PEANUT AUTO COUNT

## (undated) DEVICE — CATH IV SAFE INSYTE 14G X 1.75" (ORANGE)

## (undated) DEVICE — ENDOCATCH 10MM

## (undated) DEVICE — SYR LUER LOK 30CC

## (undated) DEVICE — SUT PROLENE 8-0 24" BV175-6

## (undated) DEVICE — PACING CABLE A/V TEMP SCREW DOWN 6FT

## (undated) DEVICE — DRAPE 3/4 SHEET 52X76"

## (undated) DEVICE — SYR LUER LOK 50CC

## (undated) DEVICE — STOPCOCK 3 WAY W SWIVEL MALE LUER LOCK

## (undated) DEVICE — BLADE SURGICAL #15 CARBON

## (undated) DEVICE — MARKING PEN W RULER

## (undated) DEVICE — BULLDOG SPRING CLIP 6MM SOFT/SOFT

## (undated) DEVICE — DRAPE TOWEL WHITE 17" X 24"

## (undated) DEVICE — ELCTR BOVIE PENCIL HANDPIECE ROCKER SWITCH 15FT

## (undated) DEVICE — SUCTION TUBE CARDIAC SOFT TIP 6FR SHAFT 10FR TIP 6"

## (undated) DEVICE — SYS VEIN HARVESTING VIRTUOSAPH PLUS W/ RADIAL

## (undated) DEVICE — VENODYNE/SCD SLEEVE CALF MEDIUM

## (undated) DEVICE — POSITIONER CARDIAC BUMP

## (undated) DEVICE — DRSG OPSITE 13.75 X 4"

## (undated) DEVICE — TUBING SUCTION CONN 6FT STERILE

## (undated) DEVICE — SUT POLYSORB 0 36" GU-46

## (undated) DEVICE — PACING CABLE BI-V TEMP ALLIGATOR CLIP 12FT

## (undated) DEVICE — DRSG STERISTRIPS 0.5 X 4"

## (undated) DEVICE — LAP PAD 18 X 18"

## (undated) DEVICE — SUT GORETEX CV-4 (3-0) 36" TH-18

## (undated) DEVICE — WARMING BLANKET UPPER ADULT

## (undated) DEVICE — SUT PROLENE 6-0 30" C-1

## (undated) DEVICE — SUT VICRYL 0 36" CTX UNDYED